# Patient Record
Sex: MALE | Race: WHITE | NOT HISPANIC OR LATINO | Employment: OTHER | URBAN - NONMETROPOLITAN AREA
[De-identification: names, ages, dates, MRNs, and addresses within clinical notes are randomized per-mention and may not be internally consistent; named-entity substitution may affect disease eponyms.]

---

## 2017-01-13 ENCOUNTER — OFFICE VISIT (OUTPATIENT)
Dept: FAMILY MEDICINE CLINIC | Facility: CLINIC | Age: 57
End: 2017-01-13

## 2017-01-13 VITALS
OXYGEN SATURATION: 98 % | HEIGHT: 68 IN | WEIGHT: 315 LBS | BODY MASS INDEX: 47.74 KG/M2 | SYSTOLIC BLOOD PRESSURE: 132 MMHG | HEART RATE: 72 BPM | DIASTOLIC BLOOD PRESSURE: 76 MMHG

## 2017-01-13 DIAGNOSIS — M25.562 CHRONIC PAIN OF BOTH KNEES: Primary | ICD-10-CM

## 2017-01-13 DIAGNOSIS — M54.42 CHRONIC BILATERAL LOW BACK PAIN WITH LEFT-SIDED SCIATICA: ICD-10-CM

## 2017-01-13 DIAGNOSIS — G89.29 CHRONIC PAIN OF BOTH KNEES: Primary | ICD-10-CM

## 2017-01-13 DIAGNOSIS — M25.561 CHRONIC PAIN OF BOTH KNEES: Primary | ICD-10-CM

## 2017-01-13 DIAGNOSIS — G89.29 CHRONIC BILATERAL LOW BACK PAIN WITH LEFT-SIDED SCIATICA: ICD-10-CM

## 2017-01-13 PROCEDURE — 99203 OFFICE O/P NEW LOW 30 MIN: CPT | Performed by: FAMILY MEDICINE

## 2017-01-13 RX ORDER — APIXABAN 5 MG/1
TABLET, FILM COATED ORAL
Refills: 11 | COMMUNITY
Start: 2017-01-03 | End: 2017-01-13

## 2017-01-13 RX ORDER — PREDNISONE 20 MG/1
TABLET ORAL
Refills: 0 | COMMUNITY
Start: 2017-01-03 | End: 2017-06-19

## 2017-01-13 NOTE — MR AVS SNAPSHOT
Uriah Willingham   1/13/2017 10:15 AM   Office Visit    Dept Phone:  [phone]   Encounter #:  [id number]    Provider:  Jaiden Rivera MD   Department:  [facility]                Your Full Care Plan              Your Updated Medication List          This list is accurate as of: 1/13/17 11:11 AM.  Always use your most recent med list.                celecoxib 200 MG capsule   Commonly known as:  CeleBREX       ELIQUIS PO       magnesium oxide 400 (241.3 MG) MG tablet tablet   Commonly known as:  MAGOX       metoprolol succinate XL 25 MG 24 hr tablet   Commonly known as:  TOPROL-XL       predniSONE 20 MG tablet   Commonly known as:  DELTASONE               You Were Diagnosed With        Codes Comments    Chronic pain of both knees    -  Primary ICD-10-CM: M25.561, M25.562, G89.29  ICD-9-CM: 719.46, 338.29     Chronic bilateral low back pain with left-sided sciatica     ICD-10-CM: M54.42, G89.29  ICD-9-CM: 724.2, 724.3, 338.29       Instructions    Knee Pain  Knee pain is a very common symptom and can have many causes. Knee pain often goes away when you follow your health care provider's instructions for relieving pain and discomfort at home. However, knee pain can develop into a condition that needs treatment. Some conditions may include:  · Arthritis caused by wear and tear (osteoarthritis).  · Arthritis caused by swelling and irritation (rheumatoid arthritis or gout).  · A cyst or growth in your knee.  · An infection in your knee joint.  · An injury that will not heal.  · Damage, swelling, or irritation of the tissues that support your knee (torn ligaments or tendinitis).  If your knee pain continues, additional tests may be ordered to diagnose your condition. Tests may include X-rays or other imaging studies of your knee. You may also need to have fluid removed from your knee. Treatment for ongoing knee pain depends on the cause, but treatment may  include:  · Medicines to relieve pain or swelling.  · Steroid injections in your knee.  · Physical therapy.  · Surgery.  HOME CARE INSTRUCTIONS  · Take medicines only as directed by your health care provider.  · Rest your knee and keep it raised (elevated) while you are resting.  · Do not do things that cause or worsen pain.  · Avoid high-impact activities or exercises, such as running, jumping rope, or doing jumping jacks.  · Apply ice to the knee area:    Put ice in a plastic bag.    Place a towel between your skin and the bag.    Leave the ice on for 20 minutes, 2-3 times a day.  · Ask your health care provider if you should wear an elastic knee support.  · Keep a pillow under your knee when you sleep.  · Lose weight if you are overweight. Extra weight can put pressure on your knee.  · Do not use any tobacco products, including cigarettes, chewing tobacco, or electronic cigarettes. If you need help quitting, ask your health care provider. Smoking may slow the healing of any bone and joint problems that you may have.  SEEK MEDICAL CARE IF:  · Your knee pain continues, changes, or gets worse.  · You have a fever along with knee pain.  · Your knee jean claude or locks up.  · Your knee becomes more swollen.  SEEK IMMEDIATE MEDICAL CARE IF:   · Your knee joint feels hot to the touch.  · You have chest pain or trouble breathing.     This information is not intended to replace advice given to you by your health care provider. Make sure you discuss any questions you have with your health care provider.     Document Released: 10/14/2008 Document Revised: 01/08/2016 Document Reviewed: 08/03/2015  Elsevier Interactive Patient Education ©2016 [employer]       Patient Instructions History      Upcoming Appointments     Visit Type Date Time Department    NEW PATIENT 1/13/2017 10:15 AM [facility] RESIDENT [facility]    LAB 2/13/2017  4:00 PM  [facility] OP INFUSION    FOLLOW UP 2/20/2017  3:30 PM [facility] ONC KORT[facility] Rodney Signup      "[facility] Next[employer] allows you to send messages to your doctor, view your test results, renew your prescriptions, schedule appointments, and more. To sign up, go to [employer] and click on the Sign Up Now link in the New User? box. Enter your Next[employer] Activation Code exactly as it appears below along with the last four digits of your Social Security Number and your Date of Birth () to complete the sign-up process. If you do not sign up before the expiration date, you must request a new code.    NextBio Activation Code: [email or web address]  Expires: 2017 11:09 AM    If you have questions, you can email [email or web address] or call [phone] to talk to our Next[employer] staff. Remember, NextBio is NOT to be used for urgent needs. For medical emergencies, dial 911.               Other Info from Your Visit           Your Appointments     2017  4:00 PM CST   LAB with NURSE CALVIN COREA   [facility] OP INFUSION ([address] [address] [phone]            2017  3:30 PM CST   Follow Up with Jovani Hernández MD   [facility]EMATOLOGY [facility] ONCOLOGY [address] [address] [phone]           Arrive 15 minutes prior to appointment.              Allergies     No Known Allergies      Reason for Visit     Establish Care     Hypertension     Knee Pain           Vital Signs     Blood Pressure Pulse Height Weight Oxygen Saturation Body Mass Index    132/76 (BP Location: Left arm, Patient Position: Sitting, Cuff Size: Adult) 72 68\" (172.7 cm) 363 lb 12.8 oz (165 kg) 98% 55.32 kg/m2    Smoking Status                   Never Smoker           Problems and Diagnoses Noted     Chronic pain of both knees    -  Primary    Chronic bilateral low back pain with left-sided sciatica            "

## 2017-01-13 NOTE — PROGRESS NOTES
Subjective:     Uriah Willingham is a 56 y.o. male who presents for initial evaluation of HTN and for knee pain.    New concerns: None.     Evaluation:   Blood pressure reading not indicated for medication reasons: No   Blood pressure reading refused by patient: No   Home blood pressure readings: Unknown   Blood pressure often elevated in physician office: No   Onset of symptoms: 1 month   Duration of symptoms: 1 month   Symptoms:   Headache: no   Visual disturbance: no   Fatigue: yes   Dyspnea: no   Orthopnea: no   Edema: no   Chest pain: no   Palpitations: no   Diaphoresis: no    Risk Factors:   Obesity, FHx: HTN    Comorbid Conditions:   None    The following portions of the patient's history were reviewed and updated as appropriate: allergies, current medications, past family history, past medical history, past social history, past surgical history and problem list.    Preventative:  Over the past 2 weeks, have you felt down, depressed, or hopeless?No   Over the past 2 weeks, have you felt little interest or pleasure in doing things?No  Clinical depression screening refused by patient.No     On osteoporosis therapy?No     Past Medical Hx:  Past Medical History   Diagnosis Date   • Arthritis    • Hypertension    • Obesity        Past Surgical Hx:  Past Surgical History   Procedure Laterality Date   • Colonoscopy  07/22/2014     Diverticulosis in sigmoid colon. Normal cecum & rectum.   • Injection of medication  11/28/2016     Kenalog (2)      • Injection of medication  07/26/2013     Rocephin (1)      • Injection of medication  11/28/2016     Toradol (2)      • Appendectomy     • Tonsillectomy         Health Maintenance:  Health Maintenance   Topic Date Due   • HEPATITIS C SCREENING  01/13/2017   • INFLUENZA VACCINE  08/01/2016   • TDAP/TD VACCINES (2 - Td) 07/29/2023   • COLONOSCOPY  07/22/2024       Current Meds:    Current Outpatient Prescriptions:   •  Apixaban (ELIQUIS PO), Take 1 tablet by mouth. 1  tablet as directed Oral, Disp: , Rfl:   •  celecoxib (CeleBREX) 200 MG capsule, Take 200 mg by mouth Daily., Disp: , Rfl:   •  ELIQUIS 5 MG tablet tablet, , Disp: , Rfl: 11  •  magnesium oxide (MAGOX) 400 (241.3 MG) MG tablet tablet, Take 400 mg by mouth 2 (Two) Times a Day., Disp: , Rfl:   •  metoprolol succinate XL (TOPROL-XL) 25 MG 24 hr tablet, Take 12.5 mg by mouth Daily., Disp: , Rfl:   •  naproxen (NAPROSYN) 250 MG tablet, Take 250 mg by mouth Every Night., Disp: , Rfl:   •  predniSONE (DELTASONE) 20 MG tablet, , Disp: , Rfl: 0  •  ibuprofen (ADVIL,MOTRIN) 200 MG tablet, Take 200 mg by mouth 3 (Three) Times a Day As Needed for mild pain (1-3)., Disp: , Rfl:     Allergies:  Review of patient's allergies indicates no known allergies.    Family Hx:  Family History   Problem Relation Age of Onset   • COPD Mother    • Diabetes Father    • Heart disease Father         Social History:  Social History     Social History   • Marital status:      Spouse name: N/A   • Number of children: N/A   • Years of education: N/A     Occupational History   • Not on file.     Social History Main Topics   • Smoking status: Never Smoker   • Smokeless tobacco: Not on file   • Alcohol use Yes   • Drug use: Not on file   • Sexual activity: Not on file     Other Topics Concern   • Not on file     Social History Narrative       Review of Systems  Review of Systems   Constitutional: Negative for activity change, appetite change, fatigue and fever.   HENT: Negative for ear pain and sore throat.    Eyes: Negative for pain and visual disturbance.   Respiratory: Negative for cough and shortness of breath.    Cardiovascular: Negative for chest pain and palpitations.   Gastrointestinal: Negative for abdominal pain and nausea.   Endocrine: Negative for cold intolerance and heat intolerance.   Genitourinary: Negative for difficulty urinating and dysuria.   Musculoskeletal: Negative for arthralgias and gait problem.   Skin: Negative for  "color change and rash.   Neurological: Negative for dizziness, weakness and headaches.   Hematological: Negative for adenopathy. Does not bruise/bleed easily.   Psychiatric/Behavioral: Negative for agitation, confusion and sleep disturbance.       Objective:     Visit Vitals   • /76 (BP Location: Left arm, Patient Position: Sitting, Cuff Size: Adult)   • Pulse 72   • Ht 68\" (172.7 cm)   • Wt (!) 363 lb 12.8 oz (165 kg)   • SpO2 98%   • BMI 55.32 kg/m2     Physical Exam   Constitutional: He is oriented to person, place, and time and well-developed, well-nourished, and in no distress. No distress.   HENT:   Head: Normocephalic and atraumatic.   Right Ear: External ear normal.   Left Ear: External ear normal.   Nose: Nose normal.   Mouth/Throat: Oropharynx is clear and moist.   Eyes: Conjunctivae and EOM are normal. Pupils are equal, round, and reactive to light.   Neck: Normal range of motion. Neck supple. No tracheal deviation present. No thyromegaly present.   Cardiovascular: Normal rate, regular rhythm, normal heart sounds and intact distal pulses.  Exam reveals no gallop and no friction rub.    No murmur heard.  Pulmonary/Chest: Effort normal and breath sounds normal. No respiratory distress. He has no wheezes. He has no rales. He exhibits no tenderness.   Abdominal: Soft. Bowel sounds are normal. He exhibits no distension and no mass. There is no tenderness. There is no rebound and no guarding.   Musculoskeletal: Normal range of motion. He exhibits no edema or tenderness.   Neurological: He is alert and oriented to person, place, and time. Gait normal. GCS score is 15.   Skin: Skin is warm and dry. No rash noted. He is not diaphoretic. No erythema. No pallor.         Lab Review    none     Assessment:     Hypertension stable.    1. Chronic pain of both knees    2. Chronic bilateral low back pain with left-sided sciatica         Plan:   Uriah was seen today for establish care, hypertension and knee " pain.    Diagnoses and all orders for this visit:    Chronic pain of both knees    Chronic bilateral low back pain with left-sided sciatica        1.  Rx changes: as above  2.  Education:    EKG ordered: no    Findings: N/A   Presented an overview of HTN, expected course, considerations, risk factors, and exacerbation prevention.   Discussed treatment options for HTN: yes   Recommended restricted dietary Na intake: yes   Discussed patient action plan for HTN: yes  3.  Compliance at present is estimated to be excellent. Efforts to improve compliance (if necessary) will be directed at increased exercise.  4.  Follow up: 3 months    GOALS:  To lose weight  BARRIERS TO GOALS:  None     Preventative:  Male Preventative: Colon cancer screening is up to date  unknown status, parent/patient to bring shot records   Recommended:none  Non smoker  does not drink  eat more fruits and vegetables, increase water intake and increase physical activity    RISK SCORE: 4          This document has been electronically signed by Jaiden Rivera MD on January 13, 2017 10:52 AM

## 2017-01-13 NOTE — PATIENT INSTRUCTIONS
Knee Pain  Knee pain is a very common symptom and can have many causes. Knee pain often goes away when you follow your health care provider's instructions for relieving pain and discomfort at home. However, knee pain can develop into a condition that needs treatment. Some conditions may include:  · Arthritis caused by wear and tear (osteoarthritis).  · Arthritis caused by swelling and irritation (rheumatoid arthritis or gout).  · A cyst or growth in your knee.  · An infection in your knee joint.  · An injury that will not heal.  · Damage, swelling, or irritation of the tissues that support your knee (torn ligaments or tendinitis).  If your knee pain continues, additional tests may be ordered to diagnose your condition. Tests may include X-rays or other imaging studies of your knee. You may also need to have fluid removed from your knee. Treatment for ongoing knee pain depends on the cause, but treatment may include:  · Medicines to relieve pain or swelling.  · Steroid injections in your knee.  · Physical therapy.  · Surgery.  HOME CARE INSTRUCTIONS  · Take medicines only as directed by your health care provider.  · Rest your knee and keep it raised (elevated) while you are resting.  · Do not do things that cause or worsen pain.  · Avoid high-impact activities or exercises, such as running, jumping rope, or doing jumping jacks.  · Apply ice to the knee area:    Put ice in a plastic bag.    Place a towel between your skin and the bag.    Leave the ice on for 20 minutes, 2-3 times a day.  · Ask your health care provider if you should wear an elastic knee support.  · Keep a pillow under your knee when you sleep.  · Lose weight if you are overweight. Extra weight can put pressure on your knee.  · Do not use any tobacco products, including cigarettes, chewing tobacco, or electronic cigarettes. If you need help quitting, ask your health care provider. Smoking may slow the healing of any bone and joint problems that you may  have.  SEEK MEDICAL CARE IF:  · Your knee pain continues, changes, or gets worse.  · You have a fever along with knee pain.  · Your knee jean claude or locks up.  · Your knee becomes more swollen.  SEEK IMMEDIATE MEDICAL CARE IF:   · Your knee joint feels hot to the touch.  · You have chest pain or trouble breathing.     This information is not intended to replace advice given to you by your health care provider. Make sure you discuss any questions you have with your health care provider.     Document Released: 10/14/2008 Document Revised: 01/08/2016 Document Reviewed: 08/03/2015  [employer] Interactive Patient Education ©2016 [employer]

## 2017-02-10 DIAGNOSIS — I26.99 PULMONARY THROMBOSIS (HCC): Primary | ICD-10-CM

## 2017-02-13 ENCOUNTER — TRANSCRIBE ORDERS (OUTPATIENT)
Dept: CARDIOLOGY | Facility: CLINIC | Age: 57
End: 2017-02-13

## 2017-02-13 ENCOUNTER — LAB (OUTPATIENT)
Dept: ONCOLOGY | Facility: HOSPITAL | Age: 57
End: 2017-02-13

## 2017-02-13 DIAGNOSIS — I26.99 PULMONARY THROMBOSIS (HCC): ICD-10-CM

## 2017-02-13 DIAGNOSIS — Z86.718 PERSONAL HISTORY OF VENOUS THROMBOSIS AND EMBOLISM: Primary | ICD-10-CM

## 2017-02-13 LAB
ALBUMIN SERPL-MCNC: 4.3 G/DL (ref 3.4–4.8)
ALBUMIN/GLOB SERPL: 1.3 G/DL (ref 1.1–1.8)
ALP SERPL-CCNC: 52 U/L (ref 38–126)
ALT SERPL W P-5'-P-CCNC: 31 U/L (ref 21–72)
ANION GAP SERPL CALCULATED.3IONS-SCNC: 9 MMOL/L (ref 5–15)
AST SERPL-CCNC: 27 U/L (ref 17–59)
BASOPHILS # BLD AUTO: 0.01 10*3/MM3 (ref 0–0.2)
BASOPHILS NFR BLD AUTO: 0.2 % (ref 0–2)
BILIRUB SERPL-MCNC: 1.3 MG/DL (ref 0.2–1.3)
BUN BLD-MCNC: 21 MG/DL (ref 7–21)
BUN/CREAT SERPL: 21.6 (ref 7–25)
CALCIUM SPEC-SCNC: 9.5 MG/DL (ref 8.4–10.2)
CHLORIDE SERPL-SCNC: 102 MMOL/L (ref 95–110)
CO2 SERPL-SCNC: 27 MMOL/L (ref 22–31)
CREAT BLD-MCNC: 0.97 MG/DL (ref 0.7–1.3)
DEPRECATED RDW RBC AUTO: 41 FL (ref 35.1–43.9)
EOSINOPHIL # BLD AUTO: 0.11 10*3/MM3 (ref 0–0.7)
EOSINOPHIL NFR BLD AUTO: 1.8 % (ref 0–7)
ERYTHROCYTE [DISTWIDTH] IN BLOOD BY AUTOMATED COUNT: 12.9 % (ref 11.5–14.5)
GFR SERPL CREATININE-BSD FRML MDRD: 81 ML/MIN/1.73 (ref 56–130)
GLOBULIN UR ELPH-MCNC: 3.3 GM/DL (ref 2.3–3.5)
GLUCOSE BLD-MCNC: 130 MG/DL (ref 60–100)
HCT VFR BLD AUTO: 46.3 % (ref 39–49)
HGB BLD-MCNC: 16.5 G/DL (ref 13.7–17.3)
IMM GRANULOCYTES # BLD: 0.01 10*3/MM3 (ref 0–0.02)
IMM GRANULOCYTES NFR BLD: 0.2 % (ref 0–0.5)
LYMPHOCYTES # BLD AUTO: 2.25 10*3/MM3 (ref 0.6–4.2)
LYMPHOCYTES NFR BLD AUTO: 35.9 % (ref 10–50)
MCH RBC QN AUTO: 30.7 PG (ref 26.5–34)
MCHC RBC AUTO-ENTMCNC: 35.6 G/DL (ref 31.5–36.3)
MCV RBC AUTO: 86.2 FL (ref 80–98)
MONOCYTES # BLD AUTO: 0.29 10*3/MM3 (ref 0–0.9)
MONOCYTES NFR BLD AUTO: 4.6 % (ref 0–12)
NEUTROPHILS # BLD AUTO: 3.59 10*3/MM3 (ref 2–8.6)
NEUTROPHILS NFR BLD AUTO: 57.3 % (ref 37–80)
PLATELET # BLD AUTO: 172 10*3/MM3 (ref 150–450)
PMV BLD AUTO: 9.8 FL (ref 8–12)
POTASSIUM BLD-SCNC: 4 MMOL/L (ref 3.5–5.1)
PROT SERPL-MCNC: 7.6 G/DL (ref 6.3–8.6)
RBC # BLD AUTO: 5.37 10*6/MM3 (ref 4.37–5.74)
SODIUM BLD-SCNC: 138 MMOL/L (ref 137–145)
WBC NRBC COR # BLD: 6.26 10*3/MM3 (ref 3.2–9.8)

## 2017-02-13 PROCEDURE — 36415 COLL VENOUS BLD VENIPUNCTURE: CPT | Performed by: INTERNAL MEDICINE

## 2017-02-13 PROCEDURE — 85025 COMPLETE CBC W/AUTO DIFF WBC: CPT | Performed by: INTERNAL MEDICINE

## 2017-02-13 PROCEDURE — 81240 F2 GENE: CPT | Performed by: INTERNAL MEDICINE

## 2017-02-13 PROCEDURE — 85670 THROMBIN TIME PLASMA: CPT | Performed by: INTERNAL MEDICINE

## 2017-02-13 PROCEDURE — 85705 THROMBOPLASTIN INHIBITION: CPT | Performed by: INTERNAL MEDICINE

## 2017-02-13 PROCEDURE — 85732 THROMBOPLASTIN TIME PARTIAL: CPT | Performed by: INTERNAL MEDICINE

## 2017-02-13 PROCEDURE — 85613 RUSSELL VIPER VENOM DILUTED: CPT | Performed by: INTERNAL MEDICINE

## 2017-02-13 PROCEDURE — 80053 COMPREHEN METABOLIC PANEL: CPT | Performed by: INTERNAL MEDICINE

## 2017-02-16 LAB
DRVVT IMM 1:2 NP PPP: 43.2 SEC (ref 0–44)
LA NT DPL PPP: 50.4 SEC (ref 0–55)
LA NT DPL/LA NT HPL PPP-RTO: 1.25 RATIO (ref 0–1.4)
LA NT PLATELET PPP: 29.9 SEC (ref 0–40.6)
LUPUS ANTICOAGULANT REFLEX: ABNORMAL
SCREEN DRVVT: 50.6 SEC (ref 0–44)
THROMBIN TIME: 17.3 SEC (ref 0–20.9)

## 2017-02-20 ENCOUNTER — OFFICE VISIT (OUTPATIENT)
Dept: ONCOLOGY | Facility: CLINIC | Age: 57
End: 2017-02-20

## 2017-02-20 VITALS
RESPIRATION RATE: 20 BRPM | SYSTOLIC BLOOD PRESSURE: 191 MMHG | TEMPERATURE: 97.8 F | DIASTOLIC BLOOD PRESSURE: 88 MMHG | HEART RATE: 47 BPM

## 2017-02-20 DIAGNOSIS — I26.99 OTHER ACUTE PULMONARY EMBOLISM: Primary | ICD-10-CM

## 2017-02-20 LAB
F2 GENE MUT ANL BLD/T: NORMAL
Lab: NORMAL

## 2017-02-20 PROCEDURE — 99214 OFFICE O/P EST MOD 30 MIN: CPT | Performed by: INTERNAL MEDICINE

## 2017-02-20 NOTE — PROGRESS NOTES
DATE OF VISIT: 2/20/2017    REASON FOR VISIT:  Left lower extremity DVT and bilateral pulmonary embolism on Apixaban    HISTORY OF PRESENT ILLNESS:    56-year-old male with a past medical history significant for morbid obesity, history of osteoarthritis affecting bilateral hip and knees, was seen in consultation on November 28, 2016 for acute left lower extremity DVT and bilateral pulmonary embolism diagnosed on November 11, 2016.  At that point patient had hypercoagulable workup done which showed lupus anticoagulant being positive.  Patient had a repeat blood work done last week.  He is  here to discuss the result of blood work.  Denies any blood in the stool or urine.    PAST MEDICAL HISTORY:    Past Medical History   Diagnosis Date   • Arthritis    • Hypertension    • Obesity        SOCIAL HISTORY:    Social History   Substance Use Topics   • Smoking status: Never Smoker   • Smokeless tobacco: None   • Alcohol use Yes       Surgical History :  Past Surgical History   Procedure Laterality Date   • Colonoscopy  07/22/2014     Diverticulosis in sigmoid colon. Normal cecum & rectum.   • Injection of medication  11/28/2016     Kenalog (2)      • Injection of medication  07/26/2013     Rocephin (1)      • Injection of medication  11/28/2016     Toradol (2)      • Appendectomy     • Tonsillectomy         ALLERGIES:    No Known Allergies    REVIEW OF SYSTEMS:      CONSTITUTIONAL:  No fever, chills, or night sweats.     HEENT:  No epistaxis, mouth sores, or difficulty swallowing.    RESPIRATORY:  Complains of chronic shortness of breath.  Denies any new cough or hemoptysis.    CARDIOVASCULAR:  No chest pain or palpitations.    GASTROINTESTINAL:  No abdominal pain, nausea, vomiting, or blood in the stool.    GENITOURINARY:  No dysuria or hematuria.    MUSCULOSKELETAL:  No any new back pain or arthralgias.     NEUROLOGICAL:  No tingling or numbness. No new headache or dizziness.     LYMPHATICS:  Denies any abnormal swollen  and anywhere in the body.    SKIN:  Denies any new skin rash.    PHYSICAL EXAMINATION:      VITAL SIGNS:    Visit Vitals   • BP (!) 191/88   • Pulse (!) 47   • Temp 97.8 °F (36.6 °C)   • Resp 20       GENERAL:  Not in any distress.    HEENT:  Normocephalic, Atraumatic.No Conjunctival pallor. No icterus. Extraocular Movements Intact. No Fascial Asymmetry noted.    NECK:  No adenopathy. No JVD.    RESPIRATORY:  Fair air entry bilateral. No rhonchi or wheezing.    CARDIOVASCULAR:  S1, S2. Regular rate and rhythm. No murmur or gallop appreciated.    ABDOMEN:  Soft, obese, nontender. Bowel sounds present in all four quadrants.  No organomegaly appreciated.    EXTREMITIES:  No edema.No Calf Tenderness.    NEUROLOGIC:  Alert, awake and oriented ×3.  No  Motor or sensory deficit appreciated. Cranial Nerves 2-12 grossly intact.      DIAGNOSTIC DATA:    GLUCOSE   Date Value Ref Range Status   02/13/2017 130 (H) 60 - 100 mg/dL Final   11/11/2016 213 (H) 60 - 100 mg/dl Final     SODIUM   Date Value Ref Range Status   02/13/2017 138 137 - 145 mmol/L Final   11/11/2016 139 137 - 145 mmol/L Final     POTASSIUM   Date Value Ref Range Status   02/13/2017 4.0 3.5 - 5.1 mmol/L Final   11/11/2016 3.9 3.5 - 5.1 mmol/L Final     CO2   Date Value Ref Range Status   02/13/2017 27.0 22.0 - 31.0 mmol/L Final   11/11/2016 24 22 - 31 mmol/L Final     CHLORIDE   Date Value Ref Range Status   02/13/2017 102 95 - 110 mmol/L Final   11/11/2016 103 95 - 110 mmol/L Final     ANION GAP   Date Value Ref Range Status   02/13/2017 9.0 5.0 - 15.0 mmol/L Final   11/11/2016 12.0 5.0 - 15.0 mmol/L Final     CREATININE   Date Value Ref Range Status   02/13/2017 0.97 0.70 - 1.30 mg/dL Final   11/11/2016 0.9 0.7 - 1.3 mg/dl Final     BUN   Date Value Ref Range Status   02/13/2017 21 7 - 21 mg/dL Final   11/11/2016 17 7 - 21 mg/dl Final     BUN/CREATININE RATIO   Date Value Ref Range Status   02/13/2017 21.6 7.0 - 25.0 Final     CALCIUM   Date Value Ref Range  Status   02/13/2017 9.5 8.4 - 10.2 mg/dL Final   11/11/2016 9.4 8.4 - 10.2 mg/dl Final     EGFR NON  AMER   Date Value Ref Range Status   02/13/2017 81 56 - 130 mL/min/1.73 Final     ALKALINE PHOSPHATASE   Date Value Ref Range Status   02/13/2017 52 38 - 126 U/L Final   11/11/2016 71 38 - 126 U/L Final     TOTAL PROTEIN   Date Value Ref Range Status   02/13/2017 7.6 6.3 - 8.6 g/dL Final   11/11/2016 7.6 6.3 - 8.6 gm/dl Final     ALT (SGPT)   Date Value Ref Range Status   02/13/2017 31 21 - 72 U/L Final   11/11/2016 28 21 - 72 U/L Final     AST (SGOT)   Date Value Ref Range Status   02/13/2017 27 17 - 59 U/L Final   11/11/2016 28 17 - 59 U/L Final     TOTAL BILIRUBIN   Date Value Ref Range Status   02/13/2017 1.3 0.2 - 1.3 mg/dL Final   11/11/2016 2.2 (H) 0.2 - 1.3 mg/dl Final     ALBUMIN   Date Value Ref Range Status   02/13/2017 4.30 3.40 - 4.80 g/dL Final   11/11/2016 4.1 3.4 - 4.8 gm/dl Final     GLOBULIN   Date Value Ref Range Status   02/13/2017 3.3 2.3 - 3.5 gm/dL Final     A/G RATIO   Date Value Ref Range Status   02/13/2017 1.3 1.1 - 1.8 g/dL Final     Lab Results   Component Value Date    WBC 6.26 02/13/2017    HGB 16.5 02/13/2017    HCT 46.3 02/13/2017    MCV 86.2 02/13/2017     02/13/2017     Lab Results   Component Value Date    NEUTROABS 3.59 02/13/2017     Lupus anticoagulant done on February 13, 2017 is negative for any Lupus anticoagulant activity.  Prothrombin gene mutation done on February 13, 2017 is negative.        RADIOLOGY DATA :  CT angiogram done on November 11, 2016 shows:  FINDINGS- Lungs are expanded. There is peripheral airspace opacity in  the right lower lobe that may represent pulmonary infarct.   Bronchovascular markings are mildly prominent. There is densely  calcified lingular pulmonary nodule. Lungs are otherwise clear. No  pleural effusions are visualized.      The heart is mildly enlarged without evidence for pericardial  effusion. Atherosclerotic calcification of  the coronary arteries is  present.      Pulmonary arteries reveal multiple pulmonary emboli within the lobar  branches of the right upper lobe, right lower lobe, right middle lobe,  left lower lobe, lingula and left upper lobe. Emboli are also visible  within the distal right and left main pulmonary arteries.      The thoracic aorta is mildly tortuous..      There is no evidence for mediastinal, hilar or axillary  lymphadenopathy.      Chest wall has a normal appearance. There is multilevel thoracic  spondylosis.      Visualized abdominal structures are within normal limits.      IMPRESSION- Multiple bilateral pulmonary emboli throughout both lungs  with possible right lower lobe pulmonary infarct      Doppler ultrasound of bilateral lower extremity done on November 11, 2016 showed:  CONCLUSION- Sonographic evidence for deep venous stenosis within the  left popliteal vein and proximal left posterior tibial vein.    ASSESSMENT AND PLAN:      1.  Left lower extremity DVT and bilateral pulmonary embolism diagnosed in November 11, 2016.  Patient had a hypercoagulable workup done including prothrombin gene mutation, factor V Leiden, antithrombin III, protein C, protein S, anticardiolipin antibody, lupus anticoagulant everything is negative so far.  DVT and PE most likely secondary to his morbid obesity.  At this point recommend continuing anticoagulation with Apixaban for 3 more months to finish total 6 months of anticoagulation after that we will repeat CT angiogram before his next clinic visit in about 3 months to see what pulmonary embolism has done on anticoagulation.  He still complains of shortness of breath with minimal exertion which could be related to his morbid obesity versus residual clot.  We will see him back in about 3 months with a repeat CBC, CMP and CT angiogram done prior to that.  Patient had a recent Doppler ultrasound of lower extremity done on February 17 results of which are not available at this  point to review.    2.  Morbid obesity    3.  Osteoarthritis    4.  Health maintenance: Patient does not smoke.  Had a colonoscopy in 2015.  He remains full code.    Jovani Hernández MD  2/20/2017  4:01 PM

## 2017-05-01 DIAGNOSIS — I26.99 OTHER PULMONARY EMBOLISM WITHOUT ACUTE COR PULMONALE, UNSPECIFIED CHRONICITY (HCC): Primary | ICD-10-CM

## 2017-05-19 ENCOUNTER — LAB (OUTPATIENT)
Dept: LAB | Facility: HOSPITAL | Age: 57
End: 2017-05-19
Attending: INTERNAL MEDICINE

## 2017-05-19 ENCOUNTER — HOSPITAL ENCOUNTER (OUTPATIENT)
Dept: CT IMAGING | Facility: HOSPITAL | Age: 57
Discharge: HOME | End: 2017-05-19
Attending: INTERNAL MEDICINE | Admitting: INTERNAL MEDICINE

## 2017-05-19 DIAGNOSIS — I26.99 OTHER PULMONARY EMBOLISM WITHOUT ACUTE COR PULMONALE, UNSPECIFIED CHRONICITY (HCC): ICD-10-CM

## 2017-05-19 DIAGNOSIS — I26.99 OTHER ACUTE PULMONARY EMBOLISM: ICD-10-CM

## 2017-05-19 LAB
ALBUMIN SERPL-MCNC: 4.4 G/DL (ref 3.4–4.8)
ALBUMIN/GLOB SERPL: 1.3 G/DL (ref 1.1–1.8)
ALP SERPL-CCNC: 63 U/L (ref 38–126)
ALT SERPL W P-5'-P-CCNC: 44 U/L (ref 21–72)
ANION GAP SERPL CALCULATED.3IONS-SCNC: 11 MMOL/L (ref 5–15)
AST SERPL-CCNC: 34 U/L (ref 17–59)
BASOPHILS # BLD AUTO: 0.02 10*3/MM3 (ref 0–0.2)
BASOPHILS NFR BLD AUTO: 0.3 % (ref 0–2)
BILIRUB SERPL-MCNC: 2 MG/DL (ref 0.2–1.3)
BUN BLD-MCNC: 23 MG/DL (ref 7–21)
BUN/CREAT SERPL: 23.5 (ref 7–25)
CALCIUM SPEC-SCNC: 9.4 MG/DL (ref 8.4–10.2)
CHLORIDE SERPL-SCNC: 105 MMOL/L (ref 95–110)
CO2 SERPL-SCNC: 24 MMOL/L (ref 22–31)
CREAT BLD-MCNC: 0.98 MG/DL (ref 0.7–1.3)
DEPRECATED RDW RBC AUTO: 40.6 FL (ref 35.1–43.9)
EOSINOPHIL # BLD AUTO: 0.1 10*3/MM3 (ref 0–0.7)
EOSINOPHIL NFR BLD AUTO: 1.3 % (ref 0–7)
ERYTHROCYTE [DISTWIDTH] IN BLOOD BY AUTOMATED COUNT: 12.6 % (ref 11.5–14.5)
GFR SERPL CREATININE-BSD FRML MDRD: 80 ML/MIN/1.73 (ref 56–130)
GLOBULIN UR ELPH-MCNC: 3.3 GM/DL (ref 2.3–3.5)
GLUCOSE BLD-MCNC: 101 MG/DL (ref 60–100)
HCT VFR BLD AUTO: 47.9 % (ref 39–49)
HGB BLD-MCNC: 16.9 G/DL (ref 13.7–17.3)
IMM GRANULOCYTES # BLD: 0.03 10*3/MM3 (ref 0–0.02)
IMM GRANULOCYTES NFR BLD: 0.4 % (ref 0–0.5)
LYMPHOCYTES # BLD AUTO: 2.28 10*3/MM3 (ref 0.6–4.2)
LYMPHOCYTES NFR BLD AUTO: 30.2 % (ref 10–50)
MCH RBC QN AUTO: 30.8 PG (ref 26.5–34)
MCHC RBC AUTO-ENTMCNC: 35.3 G/DL (ref 31.5–36.3)
MCV RBC AUTO: 87.4 FL (ref 80–98)
MONOCYTES # BLD AUTO: 0.53 10*3/MM3 (ref 0–0.9)
MONOCYTES NFR BLD AUTO: 7 % (ref 0–12)
NEUTROPHILS # BLD AUTO: 4.6 10*3/MM3 (ref 2–8.6)
NEUTROPHILS NFR BLD AUTO: 60.8 % (ref 37–80)
PLATELET # BLD AUTO: 190 10*3/MM3 (ref 150–450)
PMV BLD AUTO: 9.8 FL (ref 8–12)
POTASSIUM BLD-SCNC: 4.4 MMOL/L (ref 3.5–5.1)
PROT SERPL-MCNC: 7.7 G/DL (ref 6.3–8.6)
RBC # BLD AUTO: 5.48 10*6/MM3 (ref 4.37–5.74)
SODIUM BLD-SCNC: 140 MMOL/L (ref 137–145)
WBC NRBC COR # BLD: 7.56 10*3/MM3 (ref 3.2–9.8)

## 2017-05-19 PROCEDURE — 80053 COMPREHEN METABOLIC PANEL: CPT

## 2017-05-19 PROCEDURE — 71275 CT ANGIOGRAPHY CHEST: CPT

## 2017-05-19 PROCEDURE — 0 IOPAMIDOL PER 1 ML: Performed by: INTERNAL MEDICINE

## 2017-05-19 PROCEDURE — 85025 COMPLETE CBC W/AUTO DIFF WBC: CPT

## 2017-05-19 RX ADMIN — IOPAMIDOL 89 ML: 755 INJECTION, SOLUTION INTRAVENOUS at 16:45

## 2017-05-30 ENCOUNTER — APPOINTMENT (OUTPATIENT)
Dept: ONCOLOGY | Facility: HOSPITAL | Age: 57
End: 2017-05-30

## 2017-05-30 ENCOUNTER — OFFICE VISIT (OUTPATIENT)
Dept: ONCOLOGY | Facility: CLINIC | Age: 57
End: 2017-05-30

## 2017-05-30 VITALS
WEIGHT: 315 LBS | BODY MASS INDEX: 58.4 KG/M2 | TEMPERATURE: 97.3 F | RESPIRATION RATE: 22 BRPM | HEART RATE: 72 BPM | DIASTOLIC BLOOD PRESSURE: 90 MMHG | SYSTOLIC BLOOD PRESSURE: 175 MMHG

## 2017-05-30 DIAGNOSIS — I26.99 OTHER ACUTE PULMONARY EMBOLISM: Primary | ICD-10-CM

## 2017-05-30 PROCEDURE — 99214 OFFICE O/P EST MOD 30 MIN: CPT | Performed by: INTERNAL MEDICINE

## 2017-05-30 PROCEDURE — G0463 HOSPITAL OUTPT CLINIC VISIT: HCPCS | Performed by: INTERNAL MEDICINE

## 2017-05-30 RX ORDER — SPIRONOLACTONE 25 MG/1
25 TABLET ORAL DAILY
Refills: 10 | COMMUNITY
Start: 2017-03-10 | End: 2018-03-30

## 2017-06-19 ENCOUNTER — APPOINTMENT (OUTPATIENT)
Dept: LAB | Facility: HOSPITAL | Age: 57
End: 2017-06-19

## 2017-06-19 ENCOUNTER — HOSPITAL ENCOUNTER (OUTPATIENT)
Dept: CT IMAGING | Facility: HOSPITAL | Age: 57
Discharge: HOME | End: 2017-06-19
Admitting: NURSE PRACTITIONER

## 2017-06-19 PROCEDURE — 80053 COMPREHEN METABOLIC PANEL: CPT | Performed by: NURSE PRACTITIONER

## 2017-06-19 PROCEDURE — 85025 COMPLETE CBC W/AUTO DIFF WBC: CPT | Performed by: NURSE PRACTITIONER

## 2017-06-19 PROCEDURE — 84443 ASSAY THYROID STIM HORMONE: CPT | Performed by: NURSE PRACTITIONER

## 2017-06-19 PROCEDURE — 70450 CT HEAD/BRAIN W/O DYE: CPT

## 2017-06-19 PROCEDURE — 84439 ASSAY OF FREE THYROXINE: CPT | Performed by: NURSE PRACTITIONER

## 2017-09-01 ENCOUNTER — APPOINTMENT (OUTPATIENT)
Dept: GENERAL RADIOLOGY | Facility: HOSPITAL | Age: 57
End: 2017-09-01

## 2017-09-01 ENCOUNTER — HOSPITAL ENCOUNTER (EMERGENCY)
Facility: HOSPITAL | Age: 57
Discharge: HOME | End: 2017-09-02
Attending: EMERGENCY MEDICINE | Admitting: EMERGENCY MEDICINE

## 2017-09-01 ENCOUNTER — APPOINTMENT (OUTPATIENT)
Dept: CT IMAGING | Facility: HOSPITAL | Age: 57
End: 2017-09-01

## 2017-09-01 ENCOUNTER — APPOINTMENT (OUTPATIENT)
Dept: ULTRASOUND IMAGING | Facility: HOSPITAL | Age: 57
End: 2017-09-01

## 2017-09-01 DIAGNOSIS — R06.02 SHORTNESS OF BREATH: Primary | ICD-10-CM

## 2017-09-01 DIAGNOSIS — R09.1 PLEURISY: ICD-10-CM

## 2017-09-01 LAB
ALBUMIN SERPL-MCNC: 4.4 G/DL (ref 3.4–4.8)
ALBUMIN/GLOB SERPL: 1.5 G/DL (ref 1.1–1.8)
ALP SERPL-CCNC: 47 U/L (ref 38–126)
ALT SERPL W P-5'-P-CCNC: 41 U/L (ref 21–72)
ANION GAP SERPL CALCULATED.3IONS-SCNC: 10 MMOL/L (ref 5–15)
AST SERPL-CCNC: 28 U/L (ref 17–59)
BASOPHILS # BLD AUTO: 0.02 10*3/MM3 (ref 0–0.2)
BASOPHILS NFR BLD AUTO: 0.3 % (ref 0–2)
BILIRUB SERPL-MCNC: 2 MG/DL (ref 0.2–1.3)
BUN BLD-MCNC: 16 MG/DL (ref 7–21)
BUN/CREAT SERPL: 19 (ref 7–25)
CALCIUM SPEC-SCNC: 9.5 MG/DL (ref 8.4–10.2)
CHLORIDE SERPL-SCNC: 102 MMOL/L (ref 95–110)
CK MB SERPL-CCNC: 0.79 NG/ML (ref 0–5)
CK SERPL-CCNC: 66 U/L (ref 55–170)
CO2 SERPL-SCNC: 28 MMOL/L (ref 22–31)
CREAT BLD-MCNC: 0.84 MG/DL (ref 0.7–1.3)
D-DIMER, QUANTITATIVE (MAD,POW, STR): 494 NG/ML (FEU) (ref 0–470)
DEPRECATED RDW RBC AUTO: 42.9 FL (ref 35.1–43.9)
EOSINOPHIL # BLD AUTO: 0.11 10*3/MM3 (ref 0–0.7)
EOSINOPHIL NFR BLD AUTO: 1.8 % (ref 0–7)
ERYTHROCYTE [DISTWIDTH] IN BLOOD BY AUTOMATED COUNT: 13 % (ref 11.5–14.5)
GFR SERPL CREATININE-BSD FRML MDRD: 95 ML/MIN/1.73 (ref 56–130)
GLOBULIN UR ELPH-MCNC: 2.9 GM/DL (ref 2.3–3.5)
GLUCOSE BLD-MCNC: 100 MG/DL (ref 60–100)
HCT VFR BLD AUTO: 47 % (ref 39–49)
HGB BLD-MCNC: 16 G/DL (ref 13.7–17.3)
HOLD SPECIMEN: NORMAL
HOLD SPECIMEN: NORMAL
IMM GRANULOCYTES # BLD: 0.02 10*3/MM3 (ref 0–0.02)
IMM GRANULOCYTES NFR BLD: 0.3 % (ref 0–0.5)
LYMPHOCYTES # BLD AUTO: 2.38 10*3/MM3 (ref 0.6–4.2)
LYMPHOCYTES NFR BLD AUTO: 39.3 % (ref 10–50)
MCH RBC QN AUTO: 30.7 PG (ref 26.5–34)
MCHC RBC AUTO-ENTMCNC: 34 G/DL (ref 31.5–36.3)
MCV RBC AUTO: 90.2 FL (ref 80–98)
MONOCYTES # BLD AUTO: 0.51 10*3/MM3 (ref 0–0.9)
MONOCYTES NFR BLD AUTO: 8.4 % (ref 0–12)
NEUTROPHILS # BLD AUTO: 3.01 10*3/MM3 (ref 2–8.6)
NEUTROPHILS NFR BLD AUTO: 49.9 % (ref 37–80)
NT-PROBNP SERPL-MCNC: 57 PG/ML (ref 0–900)
PLATELET # BLD AUTO: 172 10*3/MM3 (ref 150–450)
PMV BLD AUTO: 10.3 FL (ref 8–12)
POTASSIUM BLD-SCNC: 4 MMOL/L (ref 3.5–5.1)
PROT SERPL-MCNC: 7.3 G/DL (ref 6.3–8.6)
RBC # BLD AUTO: 5.21 10*6/MM3 (ref 4.37–5.74)
SODIUM BLD-SCNC: 140 MMOL/L (ref 137–145)
TROPONIN I SERPL-MCNC: <0.012 NG/ML
WBC NRBC COR # BLD: 6.05 10*3/MM3 (ref 3.2–9.8)
WHOLE BLOOD HOLD SPECIMEN: NORMAL
WHOLE BLOOD HOLD SPECIMEN: NORMAL

## 2017-09-01 PROCEDURE — 93005 ELECTROCARDIOGRAM TRACING: CPT

## 2017-09-01 PROCEDURE — 93010 ELECTROCARDIOGRAM REPORT: CPT | Performed by: INTERNAL MEDICINE

## 2017-09-01 PROCEDURE — 71020 HC CHEST PA AND LATERAL: CPT

## 2017-09-01 PROCEDURE — 85379 FIBRIN DEGRADATION QUANT: CPT | Performed by: EMERGENCY MEDICINE

## 2017-09-01 PROCEDURE — 82550 ASSAY OF CK (CPK): CPT | Performed by: EMERGENCY MEDICINE

## 2017-09-01 PROCEDURE — 83880 ASSAY OF NATRIURETIC PEPTIDE: CPT | Performed by: EMERGENCY MEDICINE

## 2017-09-01 PROCEDURE — 0 IOPAMIDOL PER 1 ML: Performed by: EMERGENCY MEDICINE

## 2017-09-01 PROCEDURE — 84484 ASSAY OF TROPONIN QUANT: CPT | Performed by: EMERGENCY MEDICINE

## 2017-09-01 PROCEDURE — 82553 CREATINE MB FRACTION: CPT | Performed by: EMERGENCY MEDICINE

## 2017-09-01 PROCEDURE — 93970 EXTREMITY STUDY: CPT

## 2017-09-01 PROCEDURE — 80053 COMPREHEN METABOLIC PANEL: CPT | Performed by: EMERGENCY MEDICINE

## 2017-09-01 PROCEDURE — 99284 EMERGENCY DEPT VISIT MOD MDM: CPT

## 2017-09-01 PROCEDURE — 71275 CT ANGIOGRAPHY CHEST: CPT

## 2017-09-01 PROCEDURE — 85025 COMPLETE CBC W/AUTO DIFF WBC: CPT | Performed by: EMERGENCY MEDICINE

## 2017-09-01 RX ORDER — ASPIRIN 81 MG/1
81 TABLET, CHEWABLE ORAL DAILY
COMMUNITY
End: 2018-10-22

## 2017-09-01 RX ORDER — ACETAMINOPHEN 500 MG
500 TABLET ORAL 2 TIMES DAILY
COMMUNITY
End: 2018-07-30

## 2017-09-01 RX ORDER — SODIUM CHLORIDE 0.9 % (FLUSH) 0.9 %
10 SYRINGE (ML) INJECTION AS NEEDED
Status: DISCONTINUED | OUTPATIENT
Start: 2017-09-01 | End: 2017-09-02 | Stop reason: HOSPADM

## 2017-09-01 RX ADMIN — IOPAMIDOL 82 ML: 755 INJECTION, SOLUTION INTRAVENOUS at 22:09

## 2017-09-02 VITALS
SYSTOLIC BLOOD PRESSURE: 131 MMHG | DIASTOLIC BLOOD PRESSURE: 76 MMHG | RESPIRATION RATE: 18 BRPM | BODY MASS INDEX: 47.74 KG/M2 | TEMPERATURE: 98.2 F | HEIGHT: 68 IN | HEART RATE: 68 BPM | OXYGEN SATURATION: 93 % | WEIGHT: 315 LBS

## 2017-09-02 RX ORDER — AZITHROMYCIN 250 MG/1
250 TABLET, FILM COATED ORAL DAILY
Qty: 5 TABLET | Refills: 0 | Status: SHIPPED | OUTPATIENT
Start: 2017-09-02 | End: 2017-12-05

## 2017-09-02 NOTE — ED PROVIDER NOTES
Subjective   HPI Comments: Patient presents with shortness of breath x 2 days with lower extremity pain, similar to his prior episodes of DVT/PE.  Patient had been on eliquis, but patient was taken off about a month and a half ago and switched to ASA.  Patient noted the dyspnea yesterday at work, which continued today.  Noted mostly on the right side.  Is ambulatory.  Has had redness and warmth to the LLE this week, which improved with bactrim.  Patient has a hx of cellulitis for which he had a prescription already written.        History provided by:  Patient   used: No        Review of Systems   Constitutional: Negative.  Negative for appetite change, chills and fever.   HENT: Negative.  Negative for congestion.    Eyes: Negative.  Negative for photophobia and visual disturbance.   Respiratory: Positive for chest tightness and shortness of breath. Negative for cough.    Cardiovascular: Positive for leg swelling. Negative for chest pain and palpitations.   Gastrointestinal: Negative.  Negative for abdominal pain, constipation, diarrhea, nausea and vomiting.   Endocrine: Negative.    Genitourinary: Negative.  Negative for decreased urine volume, dysuria, flank pain and hematuria.   Musculoskeletal: Negative.  Negative for arthralgias, back pain, myalgias, neck pain and neck stiffness.   Skin: Negative.  Negative for pallor.   Neurological: Negative.  Negative for dizziness, syncope, weakness, light-headedness, numbness and headaches.   Psychiatric/Behavioral: Negative.  Negative for confusion and suicidal ideas. The patient is not nervous/anxious.    All other systems reviewed and are negative.      Past Medical History:   Diagnosis Date   • Arthritis    • Hypertension    • Obesity        No Known Allergies    Past Surgical History:   Procedure Laterality Date   • APPENDECTOMY     • COLONOSCOPY  07/22/2014    Diverticulosis in sigmoid colon. Normal cecum & rectum.   • INJECTION OF MEDICATION   11/28/2016    Kenalog (2)      • INJECTION OF MEDICATION  07/26/2013    Rocephin (1)      • INJECTION OF MEDICATION  11/28/2016    Toradol (2)      • TONSILLECTOMY         Family History   Problem Relation Age of Onset   • COPD Mother    • Diabetes Father    • Heart disease Father        Social History     Social History   • Marital status:      Spouse name: N/A   • Number of children: N/A   • Years of education: N/A     Social History Main Topics   • Smoking status: Never Smoker   • Smokeless tobacco: Never Used   • Alcohol use Yes   • Drug use: None   • Sexual activity: Not Asked     Other Topics Concern   • None     Social History Narrative           Objective   Physical Exam   Constitutional: He is oriented to person, place, and time. He appears well-developed and well-nourished. No distress.   HENT:   Head: Normocephalic and atraumatic.   Eyes: Conjunctivae and EOM are normal.   Neck: Normal range of motion. Neck supple. No JVD present.   Cardiovascular: Normal rate, regular rhythm, normal heart sounds and intact distal pulses.  Exam reveals no gallop and no friction rub.    No murmur heard.  Pulmonary/Chest: Effort normal and breath sounds normal. No respiratory distress. He has no wheezes. He has no rales. He exhibits tenderness (mild right).   Abdominal: Soft. Bowel sounds are normal. He exhibits no distension and no mass. There is no tenderness. There is no rebound and no guarding.   Musculoskeletal: Normal range of motion. He exhibits edema (3+ bilateral, nonpitting.  ).   Neurological: He is alert and oriented to person, place, and time.   Skin: Skin is warm and dry.   Psychiatric: He has a normal mood and affect. His behavior is normal. Judgment and thought content normal.   Nursing note and vitals reviewed.      Procedures         ED Course  ED Course      Labs Reviewed   COMPREHENSIVE METABOLIC PANEL - Abnormal; Notable for the following:        Result Value    Total Bilirubin 2.0 (*)     All  other components within normal limits   D-DIMER, QUANTITATIVE - Abnormal; Notable for the following:     D-Dimer, Quantitative 494 (*)     All other components within normal limits    Narrative:     Dimer values <500 ng/ml FEU are FDA approved as aid in diagnosis of deep venous thrombosis and pulmonary embolism.  This test should not be used in an exclusion strategy with pretest probability alone.    A recent guideline regarding diagnosis for pulmonary thomboembolism recommends an adjusted exclusion criterion of age x 10 ng/ml FEU for patients >50 years of age ([employer] Intern Med 2015; 163: 701-711).   BNP (IN-HOUSE) - Normal   TROPONIN (IN-HOUSE) - Normal   CBC WITH AUTO DIFFERENTIAL - Normal   CK - Normal   CK MB - Normal   RAINBOW DRAW    Narrative:     The following orders were created for panel order [place] Draw.  Procedure                               Abnormality         Status                     ---------                               -----------         ------                     Light Blue Top[[id number]]                                   Final result               Green Top (Gel)[[id number]]                                  Final result               Lavender Top[[id number]]                                     Final result               Gold Top - SST[[id number]]                                   Final result                 Please view results for these tests on the individual orders.   CBC AND DIFFERENTIAL    Narrative:     The following orders were created for panel order CBC & Differential.  Procedure                               Abnormality         Status                     ---------                               -----------         ------                     CBC Auto Differential[[id number]]        Normal              Final result                 Please view results for these tests on the individual orders.   LIGHT BLUE TOP   GREEN TOP   LAVENDER TOP   GOLD TOP - SST       US Venous Doppler Lower  Extremity Bilateral (duplex)   Final Result   CONCLUSION:      No evidence of deep venous thrombosis within the   femoral-popliteal system of the bilateral lower extremities.      Electronically signed by:  Sarah Chapman MD  9/1/2017 11:15 PM CDT   Workstation: [id number]      CT Angiogram Chest With Contrast   Final Result   1. Bolus timing is suboptimal for evaluation of pulmonary embolus   with no definite evidence of pulmonary embolus noted.   2. Coronary artery calcifications.   3. Otherwise essentially unremarkable.      Electronically signed by:  David Vasquez  9/1/2017 10:29 PM   CDT Workstation: [id number]      XR Chest 2 View   Final Result   No acute disease.      Electronically signed by:  David Vasquez  9/1/2017 9:17 PM CDT   Workstation: [id number]        No pneumonia, no DVT, no PE.  Normal stable VS.  Will discharge to outpatient followup and primary care referral given to establish a primary care physician.                MDM    Final diagnoses:   Shortness of breath   Pleurisy            Mariano Lozano MD  09/02/17 0022       Mariano Lozano MD  09/02/17 0130

## 2017-09-02 NOTE — ED NOTES
Pt. Presents to the ED with complaints of SOA since yesterday.  Pt. States history of PE and DVT stating also that he noticed some pain in the right calf.  Pt. States he had cellulitis on the effected leg as well recently so is unsure on side effects of DVT.  Pt. Hypertensive at triage.   VS otherwise stable.      Lizette Bursn RN  09/01/17 2016

## 2017-09-02 NOTE — DISCHARGE INSTRUCTIONS
Followup with the family medicine service as to establish a primary care provider.  Return with any new or worsening symptoms, or any concerns.

## 2017-09-05 ENCOUNTER — TELEPHONE (OUTPATIENT)
Dept: FAMILY MEDICINE CLINIC | Facility: CLINIC | Age: 57
End: 2017-09-05

## 2017-10-12 ENCOUNTER — OFFICE VISIT (OUTPATIENT)
Dept: SLEEP MEDICINE | Facility: HOSPITAL | Age: 57
End: 2017-10-12

## 2017-10-12 VITALS
BODY MASS INDEX: 47.74 KG/M2 | SYSTOLIC BLOOD PRESSURE: 130 MMHG | HEART RATE: 82 BPM | DIASTOLIC BLOOD PRESSURE: 80 MMHG | WEIGHT: 315 LBS | HEIGHT: 68 IN | OXYGEN SATURATION: 98 %

## 2017-10-12 DIAGNOSIS — G47.33 OBSTRUCTIVE SLEEP APNEA, ADULT: Primary | ICD-10-CM

## 2017-10-12 PROCEDURE — 99203 OFFICE O/P NEW LOW 30 MIN: CPT | Performed by: INTERNAL MEDICINE

## 2017-10-12 NOTE — PROGRESS NOTES
New Patient Sleep Medicine Consultation    Encounter Date: 10/12/2017         Patient's PCP: Seb Dumont MD  Referring provider: No ref. provider found  Reason for consultation: known KYLAH    Uriah Willingham is a 57 y.o. male who presents to re-establish care and get new supplies. He was seen 05/15/2007 and had good control of SDB at that time. No other notes were seen in the computer. His bedtime is ~ 2200. He  falls asleep after 20-30 minutes, and is up 2-3 times per night. He wakes up ~ 0600. He drinks 1 cups of coffee, 1 teas, and 2 sodas per day. He drinks 0-1 alcoholic beverages per week. He does not smoke. He denies abnormal dreams, cataplexy, sleep paralysis, or hypnagogic hallucinations. He does not take sedatives or hypnotics. He has no sleepiness with driving. He does not nap. RLS sx are very mild     [place] - 8    CPAP Data:    Time frame: 08/15/2017 - 10/11/2017    Compliance 100 %  CPAP/APAP settings: ?  Average 90% pressure: ? cmH2O  Leak: ?  Average AHI ? events/hr  Mask type: nasal  Machine Type: [facility]        Past Medical History:   Diagnosis Date   • Arthritis    • Hypertension    • Obesity      Social History     Social History   • Marital status:      Spouse name: N/A   • Number of children: N/A   • Years of education: N/A     Occupational History   • Not on file.     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 Patient advised to discuss any positive ROS with PCP.      There were no vitals filed for this  "visit.  There is no height or weight on file to calculate BMI.      There were no vitals filed for this visit.  There is no height or weight on file to calculate BMI.  Neck circumference: 19\"            General: Alert. Cooperative. Well developed. No acute distress.             Head:  Normocephalic. Symmetrical. Atraumatic.              Eyes: Sclera clear. No icterus. PERRLA. Normal EOM.             Ears: No deformities. Normal hearing.             Nose: No septal deviation. No drainage.          Throat: No oral lesions. No thrush. Moist mucous membranes.    Tongue is enlarged    Dentition is fair       Pharynx: Posterior pharyngeal pillars are narrow    Mallampati score of IV (only hard palate visible)    Pharynx is nonerythematous   Chest Wall:  Normal shape. Symmetric expansion with respiration. No tenderness.             Neck:  Trachea midline.           Lungs:  Clear to auscultation bilaterally. No wheezes. No rhonchi. No rales. Respirations regular, even and unlabored.            Heart:  Regular rhythm and normal rate. Normal S1 and S2. No murmur.     Abdomen:  Soft, obese  Extremities:  Moves all extremities well. No edema. Previous cellulits          Pulses: Pulses palpable and equal bilaterally.               Skin: Dry. Intact. No bleeding. No rash.           Neuro: Moves all 4 extremities and cranial nerves grossly intact.  Psychiatric: Normal mood and affect.      Current Outpatient Prescriptions:   •  acetaminophen (TYLENOL) 500 MG tablet, Take 500 mg by mouth 2 (Two) Times a Day., Disp: , Rfl:   •  Apixaban (ELIQUIS PO), Take 1 tablet by mouth. 1 tablet as directed Oral, Disp: , Rfl:   •  aspirin 81 MG chewable tablet, Chew 81 mg Daily., Disp: , Rfl:   •  azithromycin (ZITHROMAX) 250 MG tablet, Take 1 tablet by mouth Daily. Take 2 tablets the first day, then 1 tablet daily for 4 days., Disp: 5 tablet, Rfl: 0  •  celecoxib (CeleBREX) 200 MG capsule, Take 200 mg by mouth Daily., Disp: , Rfl:   •  " magnesium oxide (MAGOX) 400 (241.3 MG) MG tablet tablet, Take 400 mg by mouth 2 (Two) Times a Day., Disp: , Rfl:   •  meclizine (ANTIVERT) 25 MG tablet, Take 1 tablet by mouth 3 (Three) Times a Day As Needed for dizziness., Disp: 30 tablet, Rfl: 0  •  metoprolol succinate XL (TOPROL-XL) 25 MG 24 hr tablet, Take 25 mg by mouth 2 (Two) Times a Day., Disp: , Rfl:   •  PredniSONE (DELTASONE) 10 MG (48) tablet pack, Take as directed, Disp: 48 each, Rfl: 0  •  spironolactone (ALDACTONE) 25 MG tablet, 25 mg Daily., Disp: , Rfl: 10    ASSESSMENT:  1. Obstructive sleep apnea   1. PSG on 02/01/2007 - AHI of 21  2. New supplies and machine  3. Follow up in 30-90d  2. Restless leg syndrome/ /Bower-Ekbom disease (RLS/WED)          This document has been electronically signed by Cleveland Glover MD on October 12, 2017         CC: Seb Dumont MD          No ref. provider found

## 2017-10-30 DIAGNOSIS — G47.33 OSA (OBSTRUCTIVE SLEEP APNEA): Primary | ICD-10-CM

## 2017-12-05 ENCOUNTER — OFFICE VISIT (OUTPATIENT)
Dept: ONCOLOGY | Facility: CLINIC | Age: 57
End: 2017-12-05

## 2017-12-05 ENCOUNTER — LAB (OUTPATIENT)
Dept: ONCOLOGY | Facility: HOSPITAL | Age: 57
End: 2017-12-05

## 2017-12-05 VITALS
HEIGHT: 68 IN | WEIGHT: 315 LBS | RESPIRATION RATE: 20 BRPM | TEMPERATURE: 98.1 F | BODY MASS INDEX: 47.74 KG/M2 | DIASTOLIC BLOOD PRESSURE: 80 MMHG | SYSTOLIC BLOOD PRESSURE: 129 MMHG | HEART RATE: 57 BPM

## 2017-12-05 DIAGNOSIS — G89.29 CHRONIC LEFT-SIDED LOW BACK PAIN WITH LEFT-SIDED SCIATICA: ICD-10-CM

## 2017-12-05 DIAGNOSIS — I26.99 OTHER ACUTE PULMONARY EMBOLISM: ICD-10-CM

## 2017-12-05 DIAGNOSIS — I26.99 OTHER PULMONARY EMBOLISM WITHOUT ACUTE COR PULMONALE, UNSPECIFIED CHRONICITY (HCC): ICD-10-CM

## 2017-12-05 DIAGNOSIS — M54.42 CHRONIC LEFT-SIDED LOW BACK PAIN WITH LEFT-SIDED SCIATICA: ICD-10-CM

## 2017-12-05 LAB
ALBUMIN SERPL-MCNC: 4 G/DL (ref 3.4–4.8)
ALBUMIN/GLOB SERPL: 1.3 G/DL (ref 1.1–1.8)
ALP SERPL-CCNC: 54 U/L (ref 38–126)
ALT SERPL W P-5'-P-CCNC: 29 U/L (ref 21–72)
ANION GAP SERPL CALCULATED.3IONS-SCNC: 11 MMOL/L (ref 5–15)
AST SERPL-CCNC: 25 U/L (ref 17–59)
BASOPHILS # BLD AUTO: 0.02 10*3/MM3 (ref 0–0.2)
BASOPHILS NFR BLD AUTO: 0.3 % (ref 0–2)
BILIRUB SERPL-MCNC: 1.6 MG/DL (ref 0.2–1.3)
BUN BLD-MCNC: 17 MG/DL (ref 7–21)
BUN/CREAT SERPL: 14.7 (ref 7–25)
CALCIUM SPEC-SCNC: 9.5 MG/DL (ref 8.4–10.2)
CHLORIDE SERPL-SCNC: 105 MMOL/L (ref 95–110)
CO2 SERPL-SCNC: 25 MMOL/L (ref 22–31)
CREAT BLD-MCNC: 1.16 MG/DL (ref 0.7–1.3)
DEPRECATED RDW RBC AUTO: 39.8 FL (ref 35.1–43.9)
EOSINOPHIL # BLD AUTO: 0.13 10*3/MM3 (ref 0–0.7)
EOSINOPHIL NFR BLD AUTO: 2.1 % (ref 0–7)
ERYTHROCYTE [DISTWIDTH] IN BLOOD BY AUTOMATED COUNT: 12.4 % (ref 11.5–14.5)
GFR SERPL CREATININE-BSD FRML MDRD: 65 ML/MIN/1.73 (ref 60–130)
GLOBULIN UR ELPH-MCNC: 3.2 GM/DL (ref 2.3–3.5)
GLUCOSE BLD-MCNC: 106 MG/DL (ref 60–100)
HCT VFR BLD AUTO: 46.3 % (ref 39–49)
HGB BLD-MCNC: 16.3 G/DL (ref 13.7–17.3)
IMM GRANULOCYTES # BLD: 0.03 10*3/MM3 (ref 0–0.02)
IMM GRANULOCYTES NFR BLD: 0.5 % (ref 0–0.5)
LYMPHOCYTES # BLD AUTO: 2.37 10*3/MM3 (ref 0.6–4.2)
LYMPHOCYTES NFR BLD AUTO: 37.6 % (ref 10–50)
MCH RBC QN AUTO: 31.2 PG (ref 26.5–34)
MCHC RBC AUTO-ENTMCNC: 35.2 G/DL (ref 31.5–36.3)
MCV RBC AUTO: 88.7 FL (ref 80–98)
MONOCYTES # BLD AUTO: 0.51 10*3/MM3 (ref 0–0.9)
MONOCYTES NFR BLD AUTO: 8.1 % (ref 0–12)
NEUTROPHILS # BLD AUTO: 3.24 10*3/MM3 (ref 2–8.6)
NEUTROPHILS NFR BLD AUTO: 51.4 % (ref 37–80)
PLATELET # BLD AUTO: 167 10*3/MM3 (ref 150–450)
PMV BLD AUTO: 10.3 FL (ref 8–12)
POTASSIUM BLD-SCNC: 3.9 MMOL/L (ref 3.5–5.1)
PROT SERPL-MCNC: 7.2 G/DL (ref 6.3–8.6)
RBC # BLD AUTO: 5.22 10*6/MM3 (ref 4.37–5.74)
SODIUM BLD-SCNC: 141 MMOL/L (ref 137–145)
WBC NRBC COR # BLD: 6.3 10*3/MM3 (ref 3.2–9.8)

## 2017-12-05 PROCEDURE — 99214 OFFICE O/P EST MOD 30 MIN: CPT | Performed by: NURSE PRACTITIONER

## 2017-12-05 PROCEDURE — G0463 HOSPITAL OUTPT CLINIC VISIT: HCPCS | Performed by: NURSE PRACTITIONER

## 2017-12-05 PROCEDURE — 85025 COMPLETE CBC W/AUTO DIFF WBC: CPT

## 2017-12-05 PROCEDURE — 80053 COMPREHEN METABOLIC PANEL: CPT

## 2017-12-05 RX ORDER — PREDNISONE 10 MG/1
TABLET ORAL
Refills: 0 | COMMUNITY
Start: 2017-09-06 | End: 2017-12-05

## 2017-12-05 NOTE — PROGRESS NOTES
DATE OF VISIT: 12/5/2017    REASON FOR VISIT:  6 month follow-up    HISTORY OF PRESENT ILLNESS:      57 yr old male with history of morbid obesity, osteoarthritis that affects bilateral hips and knees, and history of left lower extremity DVT and bilateral pulmonary embolism diagnosed in November 2016.    He was referred to [facility]. He had a hypercoaguable workup including prothrombin gene mutation, factor V Leiden, antithrombin III, protein C and S, anticardiolipin antibody, lupus anticoagulant all were negative. He had a repeat Ct angiogram and ultrasound of lower extremity and both were negative for any residual blood clot. It was felt his DVT and PE were related to his morbid obesity. At that time he had finished 6 months of Apixaban therapy and there is no clinical recommendation to continue anticoagulation longer than 6 mos. He was advised to take 81 mg Aspirin daily.    Pt is here today for 6 month follow-up. He did go to ER back in September for possible new blood clot, CT angiogram and doppler studies were negative.    PAST MEDICAL HISTORY:    Past Medical History:   Diagnosis Date   • Arthritis    • Hypertension    • Obesity        SOCIAL HISTORY:    Social History   Substance Use Topics   • Smoking status: Never Smoker   • Smokeless tobacco: Never Used   • Alcohol use Yes       Surgical History :  Past Surgical History:   Procedure Laterality Date   • APPENDECTOMY     • COLONOSCOPY  07/22/2014    Diverticulosis in sigmoid colon. Normal cecum & rectum.   • INJECTION OF MEDICATION  11/28/2016    Kenalog (2)      • INJECTION OF MEDICATION  07/26/2013    Rocephin (1)      • INJECTION OF MEDICATION  11/28/2016    Toradol (2)      • TONSILLECTOMY         ALLERGIES:    No Known Allergies    REVIEW OF SYSTEMS:      CONSTITUTIONAL:  No fever, chills, or night sweats.     HEENT:  No epistaxis, mouth sores, or difficulty swallowing.    RESPIRATORY:  No new shortness of breath or cough at  "present.    CARDIOVASCULAR:  No chest pain or palpitations.    GASTROINTESTINAL:  No abdominal pain, nausea, vomiting, or blood in the stool.    GENITOURINARY:  No dysuria or hematuria.    MUSCULOSKELETAL:  No any new back pain or arthralgias.     NEUROLOGICAL:  No tingling or numbness. No new headache or dizziness.     LYMPHATICS:  Denies any abnormal swollen and anywhere in the body.    SKIN:  Denies any new skin rash.    PHYSICAL EXAMINATION:      VITAL SIGNS:  /80  Pulse 57  Temp 98.1 °F (36.7 °C) (Temporal Artery )   Resp 20  Ht 172.7 cm (67.99\")  Wt (!) 171 kg (376 lb 1.7 oz)  BMI 57.2 kg/m2    GENERAL:  Not in any distress.    HEENT:  Normocephalic, Atraumatic.Mild Conjunctival pallor. No icterus.  No Facial Asymmetry noted.    NECK:  No adenopathy. No JVD.    RESPIRATORY:  Fair air entry bilateral. No rhonchi or wheezing.    CARDIOVASCULAR:  S1, S2. Regular rate and rhythm. No murmur or gallop appreciated.    ABDOMEN:  Soft, obese, nontender. Bowel sounds present in all four quadrants.  No organomegaly appreciated.    EXTREMITIES:  1+ edema bilaterally with chronic changes r/t chronic venous stasis. No calf tenderness.    NEUROLOGIC:  Alert, awake and oriented ×3.     SKIN : No new skin lesion identified  DIAGNOSTIC DATA:    Glucose   Date Value Ref Range Status   12/05/2017 106 (H) 60 - 100 mg/dL Final     Sodium   Date Value Ref Range Status   12/05/2017 141 137 - 145 mmol/L Final     Potassium   Date Value Ref Range Status   12/05/2017 3.9 3.5 - 5.1 mmol/L Final     CO2   Date Value Ref Range Status   12/05/2017 25.0 22.0 - 31.0 mmol/L Final     Chloride   Date Value Ref Range Status   12/05/2017 105 95 - 110 mmol/L Final     Anion Gap   Date Value Ref Range Status   12/05/2017 11.0 5.0 - 15.0 mmol/L Final     Creatinine   Date Value Ref Range Status   12/05/2017 1.16 0.70 - 1.30 mg/dL Final     BUN   Date Value Ref Range Status   12/05/2017 17 7 - 21 mg/dL Final     BUN/Creatinine Ratio "   Date Value Ref Range Status   12/05/2017 14.7 7.0 - 25.0 Final     Calcium   Date Value Ref Range Status   12/05/2017 9.5 8.4 - 10.2 mg/dL Final     eGFR Non  Amer   Date Value Ref Range Status   12/05/2017 65 >60 mL/min/1.73 Final     Alkaline Phosphatase   Date Value Ref Range Status   12/05/2017 54 38 - 126 U/L Final     Total Protein   Date Value Ref Range Status   12/05/2017 7.2 6.3 - 8.6 g/dL Final     ALT (SGPT)   Date Value Ref Range Status   12/05/2017 29 21 - 72 U/L Final     AST (SGOT)   Date Value Ref Range Status   12/05/2017 25 17 - 59 U/L Final     Total Bilirubin   Date Value Ref Range Status   12/05/2017 1.6 (H) 0.2 - 1.3 mg/dL Final     Albumin   Date Value Ref Range Status   12/05/2017 4.00 3.40 - 4.80 g/dL Final     Globulin   Date Value Ref Range Status   12/05/2017 3.2 2.3 - 3.5 gm/dL Final     A/G Ratio   Date Value Ref Range Status   12/05/2017 1.3 1.1 - 1.8 g/dL Final     Lab Results   Component Value Date    WBC 6.30 12/05/2017    HGB 16.3 12/05/2017    HCT 46.3 12/05/2017    MCV 88.7 12/05/2017     12/05/2017     Lab Results   Component Value Date    NEUTROABS 3.24 12/05/2017     No results found for: , LABCA2, AFPTM, HCGQUANT, , CHROMGRNA, [id number], CEA, REFLABREPO]        ASSESSMENT AND PLAN:      1. History of left lower extremity DVT and bilateral PE; negative hypercoaguable work-up; he completed 6 months of Apixaban therapy. At this time he will continue 81 mg Aspirin daily, I advised him to stay well hydrated and if he is going to be traveling for long periods he needs to take frequent stops and get out and move around. He voices understanding. We will not need to see him again, will discharge from our clinic today.    2. Health maintenance, he does not smoke and he had a colonoscopy in 2015.    3. Morbid obesity      This document has been signed by TUNDE Cazares on December 5, 2017 12:26 PM

## 2018-01-31 ENCOUNTER — OFFICE VISIT (OUTPATIENT)
Dept: SLEEP MEDICINE | Facility: HOSPITAL | Age: 58
End: 2018-01-31

## 2018-01-31 VITALS
DIASTOLIC BLOOD PRESSURE: 80 MMHG | WEIGHT: 315 LBS | BODY MASS INDEX: 47.74 KG/M2 | HEIGHT: 68 IN | SYSTOLIC BLOOD PRESSURE: 140 MMHG

## 2018-01-31 DIAGNOSIS — G47.33 OBSTRUCTIVE SLEEP APNEA, ADULT: Primary | ICD-10-CM

## 2018-01-31 PROBLEM — Z99.89 OSA ON CPAP: Status: ACTIVE | Noted: 2018-01-31

## 2018-01-31 PROCEDURE — 99213 OFFICE O/P EST LOW 20 MIN: CPT | Performed by: INTERNAL MEDICINE

## 2018-01-31 NOTE — PROGRESS NOTES
Sleep Clinic Follow Up    Date: 1/31/2018  Primary Care Physician: Seb Dumont MD      Interim History (1/3):  Since the last visit on 10/12/2017, patient has:      1)  KYLAH - Has remained compliant with CPAP. He denies mask and machine issues, headaches, pressures intolerance, or non-compliance. He denies abnormal dreams, sleep paralysis, nasal congestion, URI sx. He has occasional dry mouth - recommend increasing humidity to 4.    PAP Data:  Time frame: 11/01/2017 - 01/29/2018   Compliance 100 %  PAP range : 15-20 cm H2O  Average 90% pressure: 17.3 cmH2O  Leak: 10.5 minutes   Average AHI 0.7 events/hr  Mask type: nasal  DME: [facility]    Bed time: 2130  Sleep latency: 15 minutes  Number of times awakens during the night: 2-3  Wake time: 0600  Estimated total sleep time at night: 8 hours  Caffeine intake: 1 coffee, 32 oz of soda  Alcohol intake: none  Nap time: none  Sleepiness with Driving: none    [place] - 10    2) Patient denies RLS symptoms.     PMHx, FH, SH reviewed and pertinent changes are: muscle relaxer for shoulder pain    REVIEW OF SYSTEMS:   Negative for chest pain, fever, chills, SOA, abdominal pain. Smoking: none      Exam (6-11/12):    Vitals:    01/31/18 0856   BP: 140/80     HR - 80  RR - 15      Body mass index is 56.41 kg/(m^2). Patient's BMI is above normal parameters. Follow-up plan includes:  exercise counseling and referral to PT.      Gen:  No distress, conversant, pleasant, appears stated age, alert, oriented  Eyes:   Anicteric sclera, moist conjunctiva, no lid lag     PERRLA, EOMI   Heent:   NC/AT    Oropharynx clear, Mallampati 4    normal hearing  Lungs:  Normal effort, non-labored breathing    Clear to auscultation    CV:  Normal S1/S2, no murmur    no lower extremity edema  ABD:  Soft, normal bowel sounds    Psych:  Appropriate affect  Neuro:  CN 2-12 intact    Past Medical History:   Diagnosis Date   • Arthritis    • Hypertension    • Obesity        Current Outpatient  Prescriptions:   •  acetaminophen (TYLENOL) 500 MG tablet, Take 500 mg by mouth 2 (Two) Times a Day., Disp: , Rfl:   •  aspirin 81 MG chewable tablet, Chew 81 mg Daily., Disp: , Rfl:   •  celecoxib (CeleBREX) 200 MG capsule, Take 200 mg by mouth Daily., Disp: , Rfl:   •  cyclobenzaprine (FLEXERIL) 10 MG tablet, Take 1 tablet by mouth 3 (Three) Times a Day As Needed for Muscle Spasms., Disp: 30 tablet, Rfl: 0  •  magnesium oxide (MAGOX) 400 (241.3 MG) MG tablet tablet, Take 400 mg by mouth 2 (Two) Times a Day., Disp: , Rfl:   •  metoprolol succinate XL (TOPROL-XL) 25 MG 24 hr tablet, Take 25 mg by mouth 2 (Two) Times a Day., Disp: , Rfl:   •  spironolactone (ALDACTONE) 25 MG tablet, 25 mg Daily., Disp: , Rfl: 10      ASSESSMENT / PLAN:   1. Obstructive sleep apnea   1. PSG on 02/01/2007 - AHI of 21  2. Compliant, increase humiddity  3. Follow up in 1 year unless sx change in the interim period.  2. Restless leg syndrome/ /Bower-Ekbom disease (RLS/WED)   1. Mild - no therapy    Total time 15 min, more than half spent in face to face counseling and coordination of care.     This document has been electronically signed by Cleveland Glover MD on January 31, 2018         CC: Seb Dumont MD          No ref. provider found

## 2018-03-30 ENCOUNTER — OFFICE VISIT (OUTPATIENT)
Dept: FAMILY MEDICINE CLINIC | Facility: CLINIC | Age: 58
End: 2018-03-30

## 2018-03-30 ENCOUNTER — APPOINTMENT (OUTPATIENT)
Dept: LAB | Facility: HOSPITAL | Age: 58
End: 2018-03-30

## 2018-03-30 VITALS
HEIGHT: 68 IN | DIASTOLIC BLOOD PRESSURE: 90 MMHG | SYSTOLIC BLOOD PRESSURE: 128 MMHG | BODY MASS INDEX: 47.74 KG/M2 | WEIGHT: 315 LBS

## 2018-03-30 DIAGNOSIS — M75.42 IMPINGEMENT SYNDROME OF LEFT SHOULDER: Primary | ICD-10-CM

## 2018-03-30 DIAGNOSIS — R03.0 ELEVATED BLOOD PRESSURE READING: ICD-10-CM

## 2018-03-30 DIAGNOSIS — Z11.59 ENCOUNTER FOR HEPATITIS C SCREENING TEST FOR LOW RISK PATIENT: ICD-10-CM

## 2018-03-30 DIAGNOSIS — M25.561 CHRONIC PAIN OF BOTH KNEES: ICD-10-CM

## 2018-03-30 DIAGNOSIS — G89.29 CHRONIC PAIN OF BOTH KNEES: ICD-10-CM

## 2018-03-30 DIAGNOSIS — I87.2 VENOUS INSUFFICIENCY: ICD-10-CM

## 2018-03-30 DIAGNOSIS — M25.562 CHRONIC PAIN OF BOTH KNEES: ICD-10-CM

## 2018-03-30 LAB
ALBUMIN SERPL-MCNC: 4.1 G/DL (ref 3.4–4.8)
ALBUMIN UR-MCNC: 1.4 MG/L
ALBUMIN/GLOB SERPL: 1.2 G/DL (ref 1.1–1.8)
ALP SERPL-CCNC: 53 U/L (ref 38–126)
ALT SERPL W P-5'-P-CCNC: 32 U/L (ref 21–72)
ANION GAP SERPL CALCULATED.3IONS-SCNC: 14 MMOL/L (ref 5–15)
AST SERPL-CCNC: 38 U/L (ref 17–59)
BILIRUB SERPL-MCNC: 1.7 MG/DL (ref 0.2–1.3)
BUN BLD-MCNC: 15 MG/DL (ref 7–21)
BUN/CREAT SERPL: 18.3 (ref 7–25)
CALCIUM SPEC-SCNC: 9.3 MG/DL (ref 8.4–10.2)
CHLORIDE SERPL-SCNC: 100 MMOL/L (ref 95–110)
CHOLEST SERPL-MCNC: 188 MG/DL (ref 0–199)
CO2 SERPL-SCNC: 26 MMOL/L (ref 22–31)
CREAT BLD-MCNC: 0.82 MG/DL (ref 0.7–1.3)
GFR SERPL CREATININE-BSD FRML MDRD: 98 ML/MIN/1.73 (ref 56–130)
GLOBULIN UR ELPH-MCNC: 3.3 GM/DL (ref 2.3–3.5)
GLUCOSE BLD-MCNC: 112 MG/DL (ref 60–100)
HBA1C MFR BLD: 6.1 % (ref 4–5.6)
HCV AB SER DONR QL: NEGATIVE
HDLC SERPL-MCNC: 46 MG/DL (ref 60–200)
LDLC SERPL CALC-MCNC: 107 MG/DL (ref 0–129)
LDLC/HDLC SERPL: 2.33 {RATIO} (ref 0–3.55)
MAGNESIUM SERPL-MCNC: 1.9 MG/DL (ref 1.6–2.3)
POTASSIUM BLD-SCNC: 4.4 MMOL/L (ref 3.5–5.1)
PROT SERPL-MCNC: 7.4 G/DL (ref 6.3–8.6)
SODIUM BLD-SCNC: 140 MMOL/L (ref 137–145)
T4 FREE SERPL-MCNC: 1.13 NG/DL (ref 0.78–2.19)
TRIGL SERPL-MCNC: 173 MG/DL (ref 20–199)
TSH SERPL DL<=0.05 MIU/L-ACNC: 1.33 MIU/ML (ref 0.46–4.68)
VLDLC SERPL-MCNC: 34.6 MG/DL

## 2018-03-30 PROCEDURE — 84439 ASSAY OF FREE THYROXINE: CPT | Performed by: FAMILY MEDICINE

## 2018-03-30 PROCEDURE — 83036 HEMOGLOBIN GLYCOSYLATED A1C: CPT | Performed by: FAMILY MEDICINE

## 2018-03-30 PROCEDURE — 36415 COLL VENOUS BLD VENIPUNCTURE: CPT | Performed by: FAMILY MEDICINE

## 2018-03-30 PROCEDURE — 82043 UR ALBUMIN QUANTITATIVE: CPT | Performed by: FAMILY MEDICINE

## 2018-03-30 PROCEDURE — 80061 LIPID PANEL: CPT | Performed by: FAMILY MEDICINE

## 2018-03-30 PROCEDURE — 99214 OFFICE O/P EST MOD 30 MIN: CPT | Performed by: FAMILY MEDICINE

## 2018-03-30 PROCEDURE — 83735 ASSAY OF MAGNESIUM: CPT | Performed by: FAMILY MEDICINE

## 2018-03-30 PROCEDURE — 80053 COMPREHEN METABOLIC PANEL: CPT | Performed by: FAMILY MEDICINE

## 2018-03-30 PROCEDURE — 84443 ASSAY THYROID STIM HORMONE: CPT | Performed by: FAMILY MEDICINE

## 2018-03-30 PROCEDURE — 86803 HEPATITIS C AB TEST: CPT | Performed by: FAMILY MEDICINE

## 2018-03-30 NOTE — PROGRESS NOTES
Subjective   Uriah Willingham is a 57 y.o. male.     History of Present Illness   requesting routine evaluation and chronic arthritic pains.  Patient's had left shoulder impingement symptoms past 2 months off and on.  Is taking high-dose anti-inflammatories which encouraged to stop.  Also having bilateral chronic knee pain related to DJD.  States she's had injections in the past but still having problems.  Is a history of venous insufficiency and hospitalized once for cellulitis from same.  Past history noted.  Also has sleep apnea as outlined.  Has had a colonoscopy within the last 3 years.    The following portions of the patient's history were reviewed and updated as appropriate: allergies, current medications, past family history, past medical history, past social history, past surgical history and problem list.    Review of Systems   Constitutional: Negative for activity change, appetite change, fatigue and unexpected weight change.   HENT: Negative for trouble swallowing and voice change.    Eyes: Negative for redness and visual disturbance.   Respiratory: Negative for cough and wheezing.    Cardiovascular: Negative for chest pain and palpitations.   Gastrointestinal: Negative for abdominal pain, constipation, diarrhea, nausea and vomiting.   Genitourinary: Negative for urgency.   Musculoskeletal: Positive for arthralgias, back pain and joint swelling.   Neurological: Negative for syncope and headaches.   Hematological: Negative for adenopathy.   Psychiatric/Behavioral: Negative for sleep disturbance.       Objective   Physical Exam   Constitutional: He is oriented to person, place, and time. He appears well-developed.   HENT:   Head: Normocephalic.   Nose: Nose normal.   Eyes: Pupils are equal, round, and reactive to light.   Neck: Normal range of motion. No thyromegaly present.   Cardiovascular: Normal rate, regular rhythm, normal heart sounds and intact distal pulses.  Exam reveals no gallop and no  friction rub.    No murmur heard.  Pulmonary/Chest: Breath sounds normal.   Abdominal: Soft. He exhibits no distension and no mass. There is no tenderness.   Musculoskeletal: Normal range of motion.   Left hip point tender laterally painful abduction internal and external rotation.   normal pronator drift negative.  Both lower knees show mild crepitance with minimal effusion bilaterally.  Gait slightly antalgic.   Neurological: He is alert and oriented to person, place, and time. He has normal reflexes.   Reflex Scores:       Patellar reflexes are 2+ on the right side and 2+ on the left side.  Skin: Skin is warm and dry.   Bilateral venous changes chronic with bronzing.  No open wounds.   Psychiatric: He has a normal mood and affect. His speech is normal and behavior is normal.       Assessment/Plan   Problems Addressed this Visit        Cardiovascular and Mediastinum    Elevated blood pressure reading    Relevant Orders    Comprehensive Metabolic Panel    Lipid Panel With LDL / HDL Ratio    Magnesium    MicroAlbumin, Urine, Random - Urine, Clean Catch    Venous insufficiency       Digestive    BMI 50.0-59.9, adult    Relevant Orders    Hemoglobin A1c    Lipid Panel With LDL / HDL Ratio    T4, Free    TSH       Musculoskeletal and Integument    Chronic pain of both knees    Relevant Orders    Ambulatory Referral to Orthopedic Surgery       Other    Impingement syndrome of left shoulder - Primary    Relevant Orders    Ambulatory Referral to Orthopedic Surgery      Other Visit Diagnoses     Encounter for hepatitis C screening test for low risk patient        Relevant Orders    Hepatitis C Antibody         on wt loss measures and programs  Given diffuse arthritic neuralgia and arthritic components at already on high-dose anti-inflammatories.  We have counseled stopping same  consultation orthopedic CVA thing interventional needs to be done.  Counseled obvious nature of obesity causing a great deal of  problem.   blood pressures also been elevated.  Is on metoprolol with minimal effect.  Screening labs as outlined recheck 7-10 days.

## 2018-04-03 DIAGNOSIS — M25.561 PAIN IN BOTH KNEES, UNSPECIFIED CHRONICITY: Primary | ICD-10-CM

## 2018-04-03 DIAGNOSIS — M25.562 PAIN IN BOTH KNEES, UNSPECIFIED CHRONICITY: Primary | ICD-10-CM

## 2018-04-04 ENCOUNTER — OFFICE VISIT (OUTPATIENT)
Dept: ORTHOPEDIC SURGERY | Facility: CLINIC | Age: 58
End: 2018-04-04

## 2018-04-04 VITALS — BODY MASS INDEX: 47.74 KG/M2 | WEIGHT: 315 LBS | HEIGHT: 68 IN | OXYGEN SATURATION: 98 % | HEART RATE: 58 BPM

## 2018-04-04 DIAGNOSIS — IMO0002 BURSITIS/TENDONITIS, SHOULDER: ICD-10-CM

## 2018-04-04 DIAGNOSIS — M47.816 LUMBAR SPONDYLOSIS: ICD-10-CM

## 2018-04-04 DIAGNOSIS — M75.42 IMPINGEMENT SYNDROME OF LEFT SHOULDER: ICD-10-CM

## 2018-04-04 DIAGNOSIS — M25.512 LEFT SHOULDER PAIN, UNSPECIFIED CHRONICITY: Primary | ICD-10-CM

## 2018-04-04 DIAGNOSIS — M25.552 LEFT HIP PAIN: ICD-10-CM

## 2018-04-04 DIAGNOSIS — M25.562 LEFT KNEE PAIN, UNSPECIFIED CHRONICITY: ICD-10-CM

## 2018-04-04 PROCEDURE — 20610 DRAIN/INJ JOINT/BURSA W/O US: CPT | Performed by: ORTHOPAEDIC SURGERY

## 2018-04-04 PROCEDURE — 99204 OFFICE O/P NEW MOD 45 MIN: CPT | Performed by: ORTHOPAEDIC SURGERY

## 2018-04-04 NOTE — PROGRESS NOTES
Uriah Willingham is a 57 y.o. male   Primary provider:  Seb Dumont MD       Chief Complaint   Patient presents with   • Left Shoulder - Pain   • Left Hip - Pain   • Left Knee - Pain       HISTORY OF PRESENT ILLNESS: Patient is here today for left shoulder pain and left hip and knee pain. Patient states that his left shoulder has been causing him pain for approximately 2 months. He states that his knees have been giving him pain for years. He was sent to [facility] upon arrival. He essentially has 3 problems.  One is the knees and he receives injections of Toradol and Kenalog, 3 month basis and has quite some time for known degenerative arthritis.  He also has hip pain and left side worse than right which is not helped.  The steroid shots have not helped him is occasional tingling mainly in his left foot but notices no numbness or weakness no history of prior problems.  Also has pain with activity in his left nondominant shoulder hurts with activity better with rest wake him at night radiates down the arm described as sharp in nature and severe is worse with activity and better with rest.  This is likely also had no treatment at this point.    History of Present Illness     CONCURRENT MEDICAL HISTORY:    Past Medical History:   Diagnosis Date   • Arthritis    • Hypertension    • Obesity        No Known Allergies      Current Outpatient Prescriptions:   •  acetaminophen (TYLENOL) 500 MG tablet, Take 500 mg by mouth 2 (Two) Times a Day., Disp: , Rfl:   •  aspirin 81 MG chewable tablet, Chew 81 mg Daily., Disp: , Rfl:   •  celecoxib (CeleBREX) 200 MG capsule, Take 200 mg by mouth Daily., Disp: , Rfl:   •  cyclobenzaprine (FLEXERIL) 10 MG tablet, Take 1 tablet by mouth 3 (Three) Times a Day As Needed for Muscle Spasms., Disp: 30 tablet, Rfl: 0  •  magnesium oxide (MAGOX) 400 (241.3 MG) MG tablet tablet, Take 400 mg by mouth 2 (Two) Times a Day., Disp: , Rfl:   •  metoprolol succinate XL (TOPROL-XL) 25 MG 24 hr  "tablet, Take 25 mg by mouth 2 (Two) Times a Day., Disp: , Rfl:     Past Surgical History:   Procedure Laterality Date   • APPENDECTOMY     • COLONOSCOPY  07/22/2014    Diverticulosis in sigmoid colon. Normal cecum & rectum.   • INJECTION OF MEDICATION  11/28/2016    Kenalog (2)      • INJECTION OF MEDICATION  07/26/2013    Rocephin (1)      • INJECTION OF MEDICATION  11/28/2016    Toradol (2)      • TONSILLECTOMY         Family History   Problem Relation Age of Onset   • COPD Mother    • Diabetes Father    • Heart disease Father         Social History     Social History   • Marital status:      Spouse name: N/A   • Number of children: N/A   • Years of education: N/A     Occupational History   • Not on file.     Social History Main Topics   • Smoking status: Never Smoker   • Smokeless tobacco: Never Used   • Alcohol use 0.6 oz/week     1 Cans of beer per week   • Drug use: No   • Sexual activity: Yes     Other Topics Concern   • Not on file     Social History Narrative   • No narrative on file        Review of Systems   Constitutional: Negative for chills and fever.   HENT: Negative for facial swelling.    Eyes: Negative for photophobia.   Respiratory: Negative for apnea and shortness of breath.    Cardiovascular: Positive for leg swelling. Negative for chest pain.   Gastrointestinal: Negative for abdominal pain, nausea and vomiting.   Genitourinary: Negative for dysuria.   Musculoskeletal: Positive for arthralgias, back pain, gait problem, joint swelling and myalgias.   Skin: Negative for color change and rash.   Neurological: Negative for seizures and syncope.   Psychiatric/Behavioral: Negative for behavioral problems and dysphoric mood.       PHYSICAL EXAMINATION:       Pulse 58   Ht 172.7 cm (68\")   Wt (!) 170 kg (374 lb)   SpO2 98%   BMI 56.87 kg/m²     Physical Exam   Constitutional: He is oriented to person, place, and time. He appears well-developed and well-nourished.   Morbidly obese   HENT: "   Head: Normocephalic and atraumatic.   Eyes: EOM are normal. Pupils are equal, round, and reactive to light.   Neck: Neck supple.   Pulmonary/Chest: Effort normal and breath sounds normal.   Musculoskeletal: Normal range of motion. He exhibits edema, tenderness and deformity.   Neurological: He is alert and oriented to person, place, and time.   Skin: Skin is warm and dry. No erythema.   Psychiatric: He has a normal mood and affect. Thought content normal.   Vitals reviewed.      GAIT:     []  Normal  [x]  Antalgic    Assistive device: [x]  None  []  Walker     []  Crutches  []  Cane     []  Wheelchair  []  Stretcher    Ortho Exam positive impingement of the left shoulder prominence but nontender over the AC joint on the left.  Resistance of the supraspinatus and infraspinatus.  Subscap is intact.  Good passive range of motion.    Good internal/external motion of both hips without pain.  Straight raising is negative but mild flexion contracture right knee greater than left.  Able to toe walk and heel walk.  Notch left greater than right.    Varus deformity of both knees.  Mild flexion contracture right greater than left.  Venous stasis changes prominent both lower extremities with 2+ pitting edema.  Grossly neurovascularly intact distally.  Xr Shoulder 2+ View Left    Result Date: 4/4/2018  Narrative: 3 views left shoulder Comparisons none AC joint arthritis with inferior spurring.  Type II acromion.  Sclerotic changes greater tuberosity. Mild cystic change humeral head.  Glenohumeral joint appears well-maintained. Impression: Evidence of likely impingement left shoulder with AC joint arthritis.    Xr Knee 1 Or 2 Vw Left    Result Date: 4/4/2018  Narrative: Lateral view knee Comparisons none Severe osteoarthritis patellofemoral joint and bone-on-bone on the lateral film femorotibial joint as well Impression: Severe DJD left knee    Xr Knee Bilateral Ap Standing    Result Date: 4/4/2018  Narrative: AP standing x-ray  bilateral knees Comparisons none Severe bone-on-bone medial osteoarthritis of both knees right worse than left.  Right leg is somewhat rotated on this view. Impression: Severe osteoarthritis both knees    Xr Hip With Or Without Pelvis 2 - 3 View Left    Result Date: 4/4/2018  Narrative: 2 views left hip with AP pelvis Comparison none X-ray difficult to see because of body habitus.  Joint space appears well maintained at both right and left hip.  Severe osteoarthrosis in the lower lumbosacral spine with marked facet hypertrophy seen on this single AP view.  No acute process seen Impression: Severe lumbar spondylosis.  No acute process seen bilateral hip       Large Joint Arthrocentesis  Date/Time: 4/4/2018 12:57 PM  Consent given by: patient  Site marked: site marked  Timeout: Immediately prior to procedure a time out was called to verify the correct patient, procedure, equipment, support staff and site/side marked as required   Supporting Documentation  Indications: pain and diagnostic evaluation   Procedure Details  Location: shoulder - L subacromial bursa  Needle size: 22 G  Approach: lateral  Medications administered: 4 mL lidocaine 1 %, 2 mL triamcinolone acetonide 40 MG/ML  Patient tolerance: patient tolerated the procedure well with no immediate complications          ASSESSMENT:    Diagnoses and all orders for this visit:    Left shoulder pain, unspecified chronicity  -     XR Shoulder 2+ View Left; Future    Left hip pain  -     XR Hip With or Without Pelvis 2 - 3 View Left; Future  -     MRI Lumbar Spine Without Contrast; Future    Left knee pain, unspecified chronicity  -     Cancel: XR Knee 3 View Left; Future  -     MRI Lumbar Spine Without Contrast; Future    Impingement syndrome of left shoulder    Bursitis/tendonitis, shoulder    Lumbar spondylosis          PLAN certainly injections in her really helped his hip or his back.  He has some neurologic symptoms been going on for years and years and  actually limit his walking.  Her proceed for MRI scan which we will have to bend Crooks in an open scanner.  I'll inject the subacromial area left side with Xylocaine and Kenalog.  Ice tonight he has good initial relief with this.  We'll place him on Thera-Band exercises to start after his pain resolves.  We also counseled about his knee and his overall BMI and bony edema making very poor candidate for any surgical intervention of his knee which he understands as well we'll see him back after his MRI scan.  We spent quite some time talking with him today regarding all of his issues.    No Follow-up on file.    Aime Cedillo MD

## 2018-04-11 DIAGNOSIS — M25.552 LEFT HIP PAIN: ICD-10-CM

## 2018-04-11 DIAGNOSIS — M25.562 LEFT KNEE PAIN, UNSPECIFIED CHRONICITY: ICD-10-CM

## 2018-04-16 ENCOUNTER — OFFICE VISIT (OUTPATIENT)
Dept: ORTHOPEDIC SURGERY | Facility: CLINIC | Age: 58
End: 2018-04-16

## 2018-04-16 ENCOUNTER — OFFICE VISIT (OUTPATIENT)
Dept: FAMILY MEDICINE CLINIC | Facility: CLINIC | Age: 58
End: 2018-04-16

## 2018-04-16 VITALS
BODY MASS INDEX: 47.74 KG/M2 | WEIGHT: 315 LBS | DIASTOLIC BLOOD PRESSURE: 80 MMHG | HEIGHT: 68 IN | SYSTOLIC BLOOD PRESSURE: 142 MMHG

## 2018-04-16 VITALS — OXYGEN SATURATION: 95 % | BODY MASS INDEX: 47.74 KG/M2 | WEIGHT: 315 LBS | HEIGHT: 68 IN | HEART RATE: 84 BPM

## 2018-04-16 DIAGNOSIS — I10 ESSENTIAL HYPERTENSION, BENIGN: ICD-10-CM

## 2018-04-16 DIAGNOSIS — M75.42 IMPINGEMENT SYNDROME OF LEFT SHOULDER: ICD-10-CM

## 2018-04-16 DIAGNOSIS — M47.816 LUMBAR SPONDYLOSIS: Primary | ICD-10-CM

## 2018-04-16 DIAGNOSIS — E11.9 TYPE 2 DIABETES MELLITUS WITHOUT COMPLICATION, WITHOUT LONG-TERM CURRENT USE OF INSULIN (HCC): Primary | ICD-10-CM

## 2018-04-16 DIAGNOSIS — M25.562 CHRONIC PAIN OF BOTH KNEES: ICD-10-CM

## 2018-04-16 DIAGNOSIS — G89.29 CHRONIC PAIN OF BOTH KNEES: ICD-10-CM

## 2018-04-16 DIAGNOSIS — M25.561 CHRONIC PAIN OF BOTH KNEES: ICD-10-CM

## 2018-04-16 PROBLEM — IMO0002 BURSITIS/TENDONITIS, SHOULDER: Status: RESOLVED | Noted: 2018-04-04 | Resolved: 2018-04-16

## 2018-04-16 PROBLEM — M25.552 LEFT HIP PAIN: Chronic | Status: ACTIVE | Noted: 2018-04-04

## 2018-04-16 PROBLEM — R03.0 ELEVATED BLOOD PRESSURE READING: Chronic | Status: RESOLVED | Noted: 2018-03-30 | Resolved: 2018-04-16

## 2018-04-16 PROBLEM — R03.0 ELEVATED BLOOD PRESSURE READING: Chronic | Status: ACTIVE | Noted: 2018-03-30

## 2018-04-16 PROBLEM — G47.33 OSA ON CPAP: Chronic | Status: ACTIVE | Noted: 2018-01-31

## 2018-04-16 PROBLEM — Z99.89 OSA ON CPAP: Chronic | Status: ACTIVE | Noted: 2018-01-31

## 2018-04-16 PROBLEM — I87.2 VENOUS INSUFFICIENCY: Chronic | Status: ACTIVE | Noted: 2018-03-30

## 2018-04-16 PROCEDURE — 99213 OFFICE O/P EST LOW 20 MIN: CPT | Performed by: ORTHOPAEDIC SURGERY

## 2018-04-16 PROCEDURE — 99214 OFFICE O/P EST MOD 30 MIN: CPT | Performed by: FAMILY MEDICINE

## 2018-04-16 RX ORDER — LISINOPRIL 5 MG/1
5 TABLET ORAL DAILY
Qty: 90 TABLET | Refills: 3 | Status: SHIPPED | OUTPATIENT
Start: 2018-04-16 | End: 2018-10-03 | Stop reason: DRUGHIGH

## 2018-04-16 RX ORDER — METFORMIN HYDROCHLORIDE 500 MG/1
500 TABLET, EXTENDED RELEASE ORAL
Qty: 90 TABLET | Refills: 3 | Status: SHIPPED | OUTPATIENT
Start: 2018-04-16 | End: 2018-10-08 | Stop reason: HOSPADM

## 2018-04-16 RX ORDER — SPIRONOLACTONE 25 MG/1
25 TABLET ORAL DAILY
COMMUNITY

## 2018-04-16 NOTE — PROGRESS NOTES
Subjective   Uriah Alexandria Willingham is a 57 y.o. male.     History of Present Illness  all up laboratory work.  Labs revealed early type 2 diabetes.  Also note forgot to mention is on spironolactone from cardiology.  Counseled on same.  Counseled extensively on diet exercise weight loss.  Negative for diabetic educator.  Also start low-dose [place] help with blood pressure.  Goal being to eventually wean off of beta blocker.  Start metformin given past history recheck again 4 months with hemoglobin A1c prior.    The following portions of the patient's history were reviewed and updated as appropriate: allergies, current medications, past family history, past medical history, past social history, past surgical history and problem list.    Review of Systems   Constitutional: Negative for activity change, appetite change, fatigue and unexpected weight change.   HENT: Negative for trouble swallowing and voice change.    Eyes: Negative for redness and visual disturbance.   Respiratory: Negative for cough and wheezing.    Cardiovascular: Negative for chest pain and palpitations.   Gastrointestinal: Negative for abdominal pain, constipation, diarrhea, nausea and vomiting.   Genitourinary: Negative for urgency.   Musculoskeletal: Positive for arthralgias, back pain and joint swelling.   Neurological: Negative for syncope and headaches.   Hematological: Negative for adenopathy.   Psychiatric/Behavioral: Negative for sleep disturbance.       Objective   Physical Exam   Constitutional: He appears well-developed.   HENT:   Head: Normocephalic.   Eyes: Pupils are equal, round, and reactive to light.   Neck: Normal range of motion.   Cardiovascular: Normal rate.    Pulmonary/Chest: Effort normal.   Abdominal: Soft.   Psychiatric: He has a normal mood and affect. His behavior is normal.       Assessment/Plan   Uriah was seen today for diabetes.    Diagnoses and all orders for this visit:    Type 2 diabetes mellitus without  complication, without long-term current use of insulin  -     metFORMIN ER (GLUCOPHAGE-XR) 500 MG 24 hr tablet; Take 1 tablet by mouth Daily With Breakfast.  -     Ambulatory Referral to Diabetic Education    Essential hypertension, benign  -     lisinopril (PRINIVIL,ZESTRIL) 5 MG tablet; Take 1 tablet by mouth Daily. For bp        above mentioned pathophysiology.  Also  on moving forward.  Recheck again 4 months hemoglobin A1c prior also be time to start pneumonia vaccine series

## 2018-04-16 NOTE — PROGRESS NOTES
"Uriah Willingham is a 57 y.o. male returns for     Chief Complaint   Patient presents with   • Results     MRI LUMBAR SPINE       HISTORY OF PRESENT ILLNESS: Patient is here today for MRI results of the lumbar spine. Patient had MRI at [facility]. Patient brought his disc with her and states that his lumbar spine pain is 4/10 today.He has a few days' relief of the shoulder injection is hurting again as well not quite as bad as it was.  Symptoms are still the same in his back and left leg.       CONCURRENT MEDICAL HISTORY:    Past Medical History:   Diagnosis Date   • Arthritis    • Hypertension    • Obesity        No Known Allergies      Current Outpatient Prescriptions:   •  acetaminophen (TYLENOL) 500 MG tablet, Take 500 mg by mouth 2 (Two) Times a Day., Disp: , Rfl:   •  aspirin 81 MG chewable tablet, Chew 81 mg Daily., Disp: , Rfl:   •  celecoxib (CeleBREX) 200 MG capsule, Take 200 mg by mouth Daily., Disp: , Rfl:   •  cyclobenzaprine (FLEXERIL) 10 MG tablet, Take 1 tablet by mouth 3 (Three) Times a Day As Needed for Muscle Spasms., Disp: 30 tablet, Rfl: 0  •  magnesium oxide (MAGOX) 400 (241.3 MG) MG tablet tablet, Take 400 mg by mouth 2 (Two) Times a Day., Disp: , Rfl:   •  metoprolol succinate XL (TOPROL-XL) 25 MG 24 hr tablet, Take 25 mg by mouth 2 (Two) Times a Day., Disp: , Rfl:     Past Surgical History:   Procedure Laterality Date   • APPENDECTOMY     • COLONOSCOPY  07/22/2014    Diverticulosis in sigmoid colon. Normal cecum & rectum.   • INJECTION OF MEDICATION  11/28/2016    Kenalog (2)      • INJECTION OF MEDICATION  07/26/2013    Rocephin (1)      • INJECTION OF MEDICATION  11/28/2016    Toradol (2)      • TONSILLECTOMY         ROS  No fevers or chills.  No chest pain or shortness of air.  No GI or  disturbances.    PHYSICAL EXAMINATION:       Pulse 84   Ht 172.7 cm (68\")   Wt (!) 170 kg (374 lb)   SpO2 95%   BMI 56.87 kg/m²     Physical Exam   Constitutional: He is oriented " to person, place, and time. He appears well-developed and well-nourished.   HENT:   Head: Normocephalic and atraumatic.   Eyes: EOM are normal. Pupils are equal, round, and reactive to light.   Neck: Neck supple.   Pulmonary/Chest: Effort normal.   Musculoskeletal: Normal range of motion. He exhibits no edema or deformity.   Neurological: He is alert and oriented to person, place, and time.   Skin: Skin is warm and dry. No erythema.   Psychiatric: He has a normal mood and affect. Thought content normal.   Vitals reviewed.      GAIT:     [x]  Normal  []  Antalgic    Assistive device: [x]  None  []  Walker     []  Crutches  []  Cane     []  Wheelchair  []  Stretcher    Ortho Exam decreased abduction.  Passive motion intact the shoulder positive impingement neurologically intact.  About the left sciatic notch straight raising is negative grossly neurologically intact.  Xr Shoulder 2+ View Left    Result Date: 4/4/2018  Narrative: 3 views left shoulder Comparisons none AC joint arthritis with inferior spurring.  Type II acromion.  Sclerotic changes greater tuberosity. Mild cystic change humeral head.  Glenohumeral joint appears well-maintained. Impression: Evidence of likely impingement left shoulder with AC joint arthritis.    Xr Knee 1 Or 2 Vw Left    Result Date: 4/4/2018  Narrative: Lateral view knee Comparisons none Severe osteoarthritis patellofemoral joint and bone-on-bone on the lateral film femorotibial joint as well Impression: Severe DJD left knee    Xr Knee Bilateral Ap Standing    Result Date: 4/4/2018  Narrative: AP standing x-ray bilateral knees Comparisons none Severe bone-on-bone medial osteoarthritis of both knees right worse than left.  Right leg is somewhat rotated on this view. Impression: Severe osteoarthritis both knees    Xr Hip With Or Without Pelvis 2 - 3 View Left    Result Date: 4/4/2018  Narrative: 2 views left hip with AP pelvis Comparison none X-ray difficult to see because of body  habitus.  Joint space appears well maintained at both right and left hip.  Severe osteoarthrosis in the lower lumbosacral spine with marked facet hypertrophy seen on this single AP view.  No acute process seen Impression: Severe lumbar spondylosis.  No acute process seen bilateral hip     MRI, Lumbar spine without gadolinium   IMPRESSION: 1. At the L5-S1 level, a combination of degenerative change in the facets and disc space narrows at the left neural foramen with uncertain but doubtful impactt on the left L5 nerve root. As a precaution, please correlate.     2. Otherwise negative.   I have reviewed his MRI myself.  I think is very difficult to read there is motion artifact and to my reading very difficult to see he does have a disc protrusion left side L5-S1.      ASSESSMENT:    Diagnoses and all orders for this visit:    Lumbar spondylosis    Chronic pain of both knees    Impingement syndrome of left shoulder          PLAN I think MRIs.diagnostic enough to send him for epidural injection.  This will be a difficult epidural.  I have spoken to Dr. Sanchez about this patient a splint is a pain management doctor at [facility] that works the disease epidurals and I'll get him as needed MRI scan of the shoulder I'm concerned about the resolution in the open scanner that he had previously of his back.  I will see him back 2 weeks after his epidural and assess his graft.        No Follow-up on file.    Aime Cedillo MD

## 2018-04-17 ENCOUNTER — TELEPHONE (OUTPATIENT)
Dept: FAMILY MEDICINE CLINIC | Facility: CLINIC | Age: 58
End: 2018-04-17

## 2018-04-17 ENCOUNTER — OFFICE VISIT (OUTPATIENT)
Dept: ENDOCRINOLOGY | Facility: CLINIC | Age: 58
End: 2018-04-17

## 2018-04-17 DIAGNOSIS — R73.03 PREDIABETES: Primary | ICD-10-CM

## 2018-04-17 DIAGNOSIS — E11.9 TYPE 2 DIABETES MELLITUS WITHOUT COMPLICATION, WITHOUT LONG-TERM CURRENT USE OF INSULIN (HCC): Primary | ICD-10-CM

## 2018-04-17 RX ORDER — BLOOD-GLUCOSE METER
1 KIT MISCELLANEOUS DAILY
Qty: 1 EACH | Refills: 5 | Status: SHIPPED | OUTPATIENT
Start: 2018-04-17

## 2018-04-17 RX ORDER — LANCETS 28 GAUGE
EACH MISCELLANEOUS
Qty: 100 EACH | Refills: 4 | Status: SHIPPED | OUTPATIENT
Start: 2018-04-17

## 2018-04-17 NOTE — PROGRESS NOTES
Uriah Willingham is a 57 y.o. male referred for outpatient diabetes education by Dr. Dumont. Patient attended individual session 04/17/2018. Topics covered included:    1. Carbohydrate Counting   i. Provided patient with detailed carbohydrate counting guide   ii. Instructed patient to eat 60 grams of carbohydrate with each meal and 15-30 grams of carbohydrate for snacks   iii. Provided patient with list of non-starchy vegetables and foods that are low in carbohydrate for snacks and to incorporate with meals    iv. Reviewed carbohydrate-containing foods, standard serving sizes, measuring foods, and nutrition label reading.    v. Reviewed the difference between simple and complex carbohydrate. Encouraged patient to choose complex carbohydrates more often.     2. Pathophysiology of Diabetes   i. Reviewed disease process of type 2 diabetes   ii. Reviewed common conditions associated with uncontrolled diabetes (diabetic neuropathy, retinopathy, nephropathy, heart disease, skin infections, and kidney disease)   iii. Discussed hemoglobin A1c and blood sugar targets    3. Hypoglycemia   i. Reviewed the signs and symptoms of low blood sugar   ii. Explained a low blood sugar is a blood glucose reading below 70 mg/dl   iii. Provided patient with handout detailing proper treatment guidelines    4. Foot Care   i. Reviewed the importance of good foot care when living with diabetes   ii. Encouraged patient to look at top and bottom of feet daily   iii. Wash feet daily with warm water and soap; no Epsom salt soaks   iv. Lotion tops and bottoms of feet, but not between toes   v. Wear proper fitting shoes to prevent sores   vi. Follow up with primary physician for non-healing wounds or ulcers    5. Discussed lower-carb snacks and meals patient can take to work     Thank you Dr. Dumont for this referral.     Antonella Palm MS, RD, LD, CDE

## 2018-06-19 ENCOUNTER — OFFICE VISIT (OUTPATIENT)
Dept: FAMILY MEDICINE CLINIC | Facility: CLINIC | Age: 58
End: 2018-06-19

## 2018-06-19 VITALS
HEIGHT: 68 IN | SYSTOLIC BLOOD PRESSURE: 140 MMHG | BODY MASS INDEX: 47.74 KG/M2 | WEIGHT: 315 LBS | DIASTOLIC BLOOD PRESSURE: 80 MMHG

## 2018-06-19 DIAGNOSIS — M25.562 CHRONIC PAIN OF BOTH KNEES: Chronic | ICD-10-CM

## 2018-06-19 DIAGNOSIS — M25.561 CHRONIC PAIN OF BOTH KNEES: Chronic | ICD-10-CM

## 2018-06-19 DIAGNOSIS — G89.29 CHRONIC PAIN OF BOTH KNEES: Chronic | ICD-10-CM

## 2018-06-19 DIAGNOSIS — M25.552 LEFT HIP PAIN: Chronic | ICD-10-CM

## 2018-06-19 DIAGNOSIS — M47.816 LUMBAR SPONDYLOSIS: Chronic | ICD-10-CM

## 2018-06-19 PROBLEM — I10 ESSENTIAL HYPERTENSION, BENIGN: Chronic | Status: ACTIVE | Noted: 2018-04-16

## 2018-06-19 PROBLEM — E11.9 TYPE 2 DIABETES MELLITUS WITHOUT COMPLICATION, WITHOUT LONG-TERM CURRENT USE OF INSULIN: Chronic | Status: ACTIVE | Noted: 2018-04-17

## 2018-06-19 PROCEDURE — 99214 OFFICE O/P EST MOD 30 MIN: CPT | Performed by: FAMILY MEDICINE

## 2018-06-19 NOTE — PROGRESS NOTES
Subjective   Uriah Alexandria Willingham is a 57 y.o. male.     History of Present Illness    questing evaluation chronic knee and back pain.  Dressing orthopedics and pain management.  Have  need for visiting with his orthopedist history seeking a second opinion regards to joint replacement of the knees etc.  Have counseled on the head majority of his orthopedic problems related to mechanics as well as weight.  Also broached the subject of possible bariatric intervention given his comorbidities.  Majority of time spent counseling on lifestyle measures and health decisions.    The following portions of the patient's history were reviewed and updated as appropriate: allergies, current medications, past family history, past medical history, past social history, past surgical history and problem list.    Review of Systems   Constitutional: Negative for activity change, appetite change, fatigue and unexpected weight change.   HENT: Negative for trouble swallowing and voice change.    Eyes: Negative for redness and visual disturbance.   Respiratory: Negative for cough and wheezing.    Cardiovascular: Negative for chest pain and palpitations.   Gastrointestinal: Negative for abdominal pain, constipation, diarrhea, nausea and vomiting.   Genitourinary: Negative for urgency.   Musculoskeletal: Positive for arthralgias, back pain, gait problem, joint swelling and myalgias.   Neurological: Negative for syncope and headaches.   Hematological: Negative for adenopathy.   Psychiatric/Behavioral: Negative for sleep disturbance.       Objective   Physical Exam   Constitutional: He appears well-developed.   HENT:   Head: Normocephalic.   Eyes: Pupils are equal, round, and reactive to light.   Neck: Normal range of motion.   Cardiovascular: Normal rate.    Pulmonary/Chest: Effort normal.   Abdominal: Soft.   Psychiatric: He has a normal mood and affect. His speech is normal and behavior is normal.       Assessment/Plan   Uriah was  seen today for hip pain.    Diagnoses and all orders for this visit:    BMI 50.0-59.9, adult  -     Ambulatory Referral to Bariatric Surgery    Chronic pain of both knees    Left hip pain    Lumbar spondylosis       on the above keep regular appointment

## 2018-07-22 ENCOUNTER — HOSPITAL ENCOUNTER (OUTPATIENT)
Dept: CT IMAGING | Facility: HOSPITAL | Age: 58
Discharge: HOME | End: 2018-07-22
Admitting: NURSE PRACTITIONER

## 2018-07-22 PROCEDURE — 74176 CT ABD & PELVIS W/O CONTRAST: CPT

## 2018-07-26 ENCOUNTER — LAB (OUTPATIENT)
Dept: LAB | Facility: HOSPITAL | Age: 58
End: 2018-07-26

## 2018-07-26 DIAGNOSIS — I10 ESSENTIAL HYPERTENSION, BENIGN: ICD-10-CM

## 2018-07-26 LAB
ANION GAP SERPL CALCULATED.3IONS-SCNC: 11 MMOL/L (ref 5–15)
BUN BLD-MCNC: 18 MG/DL (ref 7–21)
BUN/CREAT SERPL: 20.9 (ref 7–25)
CALCIUM SPEC-SCNC: 9.4 MG/DL (ref 8.4–10.2)
CHLORIDE SERPL-SCNC: 105 MMOL/L (ref 95–110)
CO2 SERPL-SCNC: 23 MMOL/L (ref 22–31)
CREAT BLD-MCNC: 0.86 MG/DL (ref 0.7–1.3)
GFR SERPL CREATININE-BSD FRML MDRD: 92 ML/MIN/1.73 (ref 56–130)
GLUCOSE BLD-MCNC: 101 MG/DL (ref 60–100)
HBA1C MFR BLD: 5.9 % (ref 4–5.6)
MAGNESIUM SERPL-MCNC: 1.7 MG/DL (ref 1.6–2.3)
POTASSIUM BLD-SCNC: 4.1 MMOL/L (ref 3.5–5.1)
SODIUM BLD-SCNC: 139 MMOL/L (ref 137–145)

## 2018-07-26 PROCEDURE — 83735 ASSAY OF MAGNESIUM: CPT

## 2018-07-26 PROCEDURE — 83036 HEMOGLOBIN GLYCOSYLATED A1C: CPT

## 2018-07-26 PROCEDURE — 80048 BASIC METABOLIC PNL TOTAL CA: CPT

## 2018-07-26 PROCEDURE — 36415 COLL VENOUS BLD VENIPUNCTURE: CPT

## 2018-07-30 ENCOUNTER — OFFICE VISIT (OUTPATIENT)
Dept: BARIATRICS/WEIGHT MGMT | Facility: HOSPITAL | Age: 58
End: 2018-07-30

## 2018-07-30 ENCOUNTER — OFFICE VISIT (OUTPATIENT)
Dept: BARIATRICS/WEIGHT MGMT | Facility: CLINIC | Age: 58
End: 2018-07-30

## 2018-07-30 ENCOUNTER — OFFICE VISIT (OUTPATIENT)
Dept: FAMILY MEDICINE CLINIC | Facility: CLINIC | Age: 58
End: 2018-07-30

## 2018-07-30 VITALS
HEIGHT: 67 IN | WEIGHT: 315 LBS | SYSTOLIC BLOOD PRESSURE: 138 MMHG | BODY MASS INDEX: 49.44 KG/M2 | DIASTOLIC BLOOD PRESSURE: 62 MMHG

## 2018-07-30 VITALS
TEMPERATURE: 98.6 F | HEIGHT: 67 IN | BODY MASS INDEX: 49.44 KG/M2 | DIASTOLIC BLOOD PRESSURE: 67 MMHG | SYSTOLIC BLOOD PRESSURE: 158 MMHG | HEART RATE: 70 BPM | OXYGEN SATURATION: 98 % | WEIGHT: 315 LBS

## 2018-07-30 DIAGNOSIS — I10 ESSENTIAL HYPERTENSION, BENIGN: Chronic | ICD-10-CM

## 2018-07-30 DIAGNOSIS — G89.29 CHRONIC PAIN OF BOTH KNEES: Chronic | ICD-10-CM

## 2018-07-30 DIAGNOSIS — G47.33 OSA ON CPAP: Chronic | ICD-10-CM

## 2018-07-30 DIAGNOSIS — E11.9 TYPE 2 DIABETES MELLITUS WITHOUT COMPLICATION, WITHOUT LONG-TERM CURRENT USE OF INSULIN (HCC): Primary | Chronic | ICD-10-CM

## 2018-07-30 DIAGNOSIS — M25.562 CHRONIC PAIN OF BOTH KNEES: Chronic | ICD-10-CM

## 2018-07-30 DIAGNOSIS — M25.561 CHRONIC PAIN OF BOTH KNEES: Chronic | ICD-10-CM

## 2018-07-30 DIAGNOSIS — R10.9 FLANK PAIN: ICD-10-CM

## 2018-07-30 DIAGNOSIS — E11.9 TYPE 2 DIABETES MELLITUS WITHOUT COMPLICATION, WITHOUT LONG-TERM CURRENT USE OF INSULIN (HCC): Chronic | ICD-10-CM

## 2018-07-30 DIAGNOSIS — Z99.89 OSA ON CPAP: Chronic | ICD-10-CM

## 2018-07-30 PROCEDURE — 99214 OFFICE O/P EST MOD 30 MIN: CPT | Performed by: FAMILY MEDICINE

## 2018-07-30 PROCEDURE — 97802 MEDICAL NUTRITION INDIV IN: CPT

## 2018-07-30 PROCEDURE — 99203 OFFICE O/P NEW LOW 30 MIN: CPT | Performed by: SURGERY

## 2018-07-30 RX ORDER — TRAMADOL HYDROCHLORIDE 50 MG/1
50 TABLET ORAL EVERY 6 HOURS PRN
COMMUNITY
End: 2018-08-31

## 2018-07-30 RX ORDER — SENNOSIDES 8.6 MG
650 CAPSULE ORAL EVERY 8 HOURS PRN
COMMUNITY

## 2018-07-30 NOTE — PROGRESS NOTES
Patient Care Team:  Chirag Dumont MD as PCP - General (Family Medicine)  Jovani Hernández MD as Consulting Physician (Hematology and Oncology)  TUNDE Cazares as Nurse Practitioner (Oncology)    Reason for Visit:  Surgical Weight loss    Subjective     Patient is a 57 y.o. male presents with morbid obesity and his Body mass index is 58.98 kg/m².     He is here for discussion of surgical weight loss options.  He stated he has been with the disease of obesity for year(s).  He stated he suffers from joint pain, sleep apnea, diabetes, heartburn and hypertension due to his weight gain.  He stated that weight loss helps alleviate these symptoms.   He stated that he has tried self applied diets such as low carb diet plans to help with weight loss.  He stated that he has attempted these conservative methods for weight loss without maintaining long term success.  Today he would like to discuss other dietary recommendations as well as surgical weight loss options.     Review of Systems  General ROS: positive for  - fatigue  Psychological ROS: negative  Ophthalmic ROS: negative  ENT ROS: negative  Hematological and Lymphatic ROS: positive for - blood clots  Endocrine ROS: positive for - prediabetic  Respiratory ROS: no cough, shortness of breath, or wheezing  Cardiovascular ROS: no chest pain or dyspnea on exertion  positive for - edema  Gastrointestinal ROS: positive for - diarrhea and heartburn  Musculoskeletal ROS: positive for - joint pain    History  Past Medical History:   Diagnosis Date   • Ankle swelling    • Arthritis    • Back pain    • Diabetes mellitus (CMS/HCC)    • Heartburn    • History of blood clots    • Hypertension    • Joint pain    • Knee pain    • Muscle pain    • Obesity    • Sleep apnea     c pap     Past Surgical History:   Procedure Laterality Date   • APPENDECTOMY      lap   • COLONOSCOPY  07/22/2014    Diverticulosis in sigmoid colon. Normal cecum & rectum.   • INJECTION OF MEDICATION   11/28/2016    Kenalog (2)      • INJECTION OF MEDICATION  07/26/2013    Rocephin (1)      • INJECTION OF MEDICATION  11/28/2016    Toradol (2)      • TONSILLECTOMY     • WISDOM TOOTH EXTRACTION       Family History   Problem Relation Age of Onset   • COPD Mother    • Heart disease Father    • Cancer Father      Social History   Substance Use Topics   • Smoking status: Never Smoker   • Smokeless tobacco: Never Used   • Alcohol use 0.6 oz/week     1 Cans of beer per week      Comment: socially       (Not in a hospital admission)  Allergies:  Patient has no known allergies.      Current Outpatient Prescriptions:   •  acetaminophen (TYLENOL) 650 MG 8 hr tablet, Take 650 mg by mouth Every 8 (Eight) Hours As Needed for Mild Pain ., Disp: , Rfl:   •  aspirin 81 MG chewable tablet, Chew 81 mg Daily., Disp: , Rfl:   •  celecoxib (CeleBREX) 200 MG capsule, Take 200 mg by mouth Daily., Disp: , Rfl:   •  gabapentin (NEURONTIN) 100 MG capsule, Take 300 mg by mouth 3 (Three) Times a Day., Disp: , Rfl:   •  Glucose Blood (FREESTYLE LITE TEST VI), , Disp: , Rfl: 3  •  glucose blood test strip, Qd and prn, Disp: 100 each, Rfl: 4  •  glucose monitor monitoring kit, 1 each Daily. And prn, Disp: 1 each, Rfl: 5  •  Lancets (FREESTYLE) lancets, Qd and prn, Disp: 100 each, Rfl: 4  •  lisinopril (PRINIVIL,ZESTRIL) 5 MG tablet, Take 1 tablet by mouth Daily. For bp, Disp: 90 tablet, Rfl: 3  •  magnesium oxide (MAGOX) 400 (241.3 MG) MG tablet tablet, Take 400 mg by mouth 2 (Two) Times a Day., Disp: , Rfl:   •  metFORMIN ER (GLUCOPHAGE-XR) 500 MG 24 hr tablet, Take 1 tablet by mouth Daily With Breakfast., Disp: 90 tablet, Rfl: 3  •  metoprolol succinate XL (TOPROL-XL) 25 MG 24 hr tablet, Take 25 mg by mouth 2 (Two) Times a Day., Disp: , Rfl:   •  spironolactone (ALDACTONE) 25 MG tablet, Take 25 mg by mouth Daily., Disp: , Rfl:   •  traMADol (ULTRAM) 50 MG tablet, Take 50 mg by mouth Every 6 (Six) Hours As Needed for Moderate Pain ., Disp: ,  Rfl:     Objective     Vital Signs  Temp:  [98.6 °F (37 °C)] 98.6 °F (37 °C)  Heart Rate:  [70] 70  BP: (158)/(67) 158/67  Body mass index is 58.98 kg/m².  1    07/30/18  0907   Weight: (!) 170 kg (373 lb 12.8 oz)       Physical Exam:      HEENT: extra ocular movement intact  Respiratory: appears well, vitals normal, no respiratory distress, acyanotic, normal RR  Neurologic: alert, oriented, normal speech, no focal findings or movement disorder noted       Results Review:   None        Assessment/Plan   Encounter Diagnoses   Name Primary?   • BMI 50.0-59.9, adult (CMS/[facility]) Yes   • Type 2 diabetes mellitus without complication, without long-term current use of insulin (CMS/[facility])    • KYLAH on CPAP    • Essential hypertension, benign    • Chronic pain of both knees        A total of 30 minutes was spent face-to-face with this patient during this encounter and over half the time was spent on counseling and coordination of care for the treatment of this disease of obesity.  We taken the time to explain the etiology of the disease as well as modifications necessary to counteract/reverse this disease process.  We discussed surgery as an option as well.  Patient would first like to consider dietary modifications prior to consideration of surgery.  He has spoken with our dietitian as well.  Recommendations include changing his dietary protein intake by limiting the amount of carbohydrates as well as the excessive amounts of fat in his meals.  I recommended that he read labels as well as increase his physical activity as tolerated.  He is to follow-up with our program in 1 month's time with nurse practitioner for continued medically supervised weight loss.  I discussed the patient's findings and my recommendations with patient and his wife.         Dr. Sherwin Alvarez MD FACS    07/30/18  10:31 AM  Patient Care Team:  Chirag Dumont MD as PCP - General (Family Medicine)  Jovani Hernández MD as Consulting Physician (Hematology and  Oncology)  TUNDE Cazares as Nurse Practitioner (Oncology)

## 2018-07-30 NOTE — PROGRESS NOTES
Subjective   Uriah Willingham is a 57 y.o. male.     History of Present Illness    follow-up urgent care for flank pain.  Also time for sugar check.  Flank pain found to be probably either mechanical or passed a small stone.  CT scan was normal except for intrarenal calcifications.  Says pain went away spontaneously.  Lab work was improved hemoglobin A1c down to less than 6.  Is also been talking with bariatric surgeon.  Overall stable and improved.    The following portions of the patient's history were reviewed and updated as appropriate: allergies, current medications, past family history, past medical history, past social history, past surgical history and problem list.    Review of Systems   Constitutional: Negative for activity change, appetite change, fatigue and unexpected weight change.   HENT: Negative for trouble swallowing and voice change.    Eyes: Negative for redness and visual disturbance.   Respiratory: Negative for cough and wheezing.    Cardiovascular: Negative for chest pain and palpitations.   Gastrointestinal: Negative for abdominal pain, constipation, diarrhea, nausea and vomiting.   Genitourinary: Negative for urgency.   Musculoskeletal: Negative for joint swelling.   Neurological: Negative for syncope and headaches.   Hematological: Negative for adenopathy.   Psychiatric/Behavioral: Negative for sleep disturbance.       Objective   Physical Exam   Constitutional: He is oriented to person, place, and time. He appears well-developed.   HENT:   Head: Normocephalic.   Nose: Nose normal.   Eyes: Pupils are equal, round, and reactive to light.   Neck: Normal range of motion. No thyromegaly present.   Cardiovascular: Normal rate, regular rhythm, normal heart sounds and intact distal pulses.  Exam reveals no gallop and no friction rub.    No murmur heard.  Pulmonary/Chest: Breath sounds normal.   Abdominal: Soft. He exhibits no distension and no mass. There is no tenderness.   Musculoskeletal:  Normal range of motion.   Neurological: He is alert and oriented to person, place, and time. He has normal reflexes.   Skin: Skin is warm and dry.   Psychiatric: He has a normal mood and affect. His behavior is normal. Judgment and thought content normal.     Results for orders placed or performed in visit on 07/26/18   Hemoglobin A1c   Result Value Ref Range    Hemoglobin A1C 5.9 (H) 4 - 5.6 %   Basic Metabolic Panel   Result Value Ref Range    Glucose 101 (H) 60 - 100 mg/dL    BUN 18 7 - 21 mg/dL    Creatinine 0.86 0.70 - 1.30 mg/dL    Sodium 139 137 - 145 mmol/L    Potassium 4.1 3.5 - 5.1 mmol/L    Chloride 105 95 - 110 mmol/L    CO2 23.0 22.0 - 31.0 mmol/L    Calcium 9.4 8.4 - 10.2 mg/dL    eGFR Non  Amer 92 56 - 130 mL/min/1.73    BUN/Creatinine Ratio 20.9 7.0 - 25.0    Anion Gap 11.0 5.0 - 15.0 mmol/L   Magnesium   Result Value Ref Range    Magnesium 1.7 1.6 - 2.3 mg/dL       Assessment/Plan   Uriah was seen today for follow-up.    Diagnoses and all orders for this visit:    Type 2 diabetes mellitus without complication, without long-term current use of insulin (CMS/[facility])    Essential hypertension, benign    BMI 50.0-59.9, adult (CMS/[facility])    Flank pain       summertime hydration  continued weight loss.  Recheck 4 months betime for another hemoglobin A1c and a start pneumonia series

## 2018-08-31 ENCOUNTER — OFFICE VISIT (OUTPATIENT)
Dept: BARIATRICS/WEIGHT MGMT | Facility: CLINIC | Age: 58
End: 2018-08-31

## 2018-08-31 VITALS
TEMPERATURE: 98 F | SYSTOLIC BLOOD PRESSURE: 126 MMHG | BODY MASS INDEX: 49.44 KG/M2 | OXYGEN SATURATION: 99 % | HEIGHT: 67 IN | HEART RATE: 71 BPM | DIASTOLIC BLOOD PRESSURE: 76 MMHG | WEIGHT: 315 LBS

## 2018-08-31 DIAGNOSIS — I10 ESSENTIAL HYPERTENSION: ICD-10-CM

## 2018-08-31 DIAGNOSIS — E66.9 DIABETES MELLITUS TYPE 2 IN OBESE (HCC): ICD-10-CM

## 2018-08-31 DIAGNOSIS — E66.01 OBESITY, MORBID, BMI 50 OR HIGHER (HCC): Primary | ICD-10-CM

## 2018-08-31 DIAGNOSIS — G47.33 OSA ON CPAP: Chronic | ICD-10-CM

## 2018-08-31 DIAGNOSIS — Z99.89 OSA ON CPAP: Chronic | ICD-10-CM

## 2018-08-31 DIAGNOSIS — E11.69 DIABETES MELLITUS TYPE 2 IN OBESE (HCC): ICD-10-CM

## 2018-08-31 PROCEDURE — 99214 OFFICE O/P EST MOD 30 MIN: CPT | Performed by: NURSE PRACTITIONER

## 2018-08-31 RX ORDER — HYDROCODONE BITARTRATE AND ACETAMINOPHEN 5; 325 MG/1; MG/1
1 TABLET ORAL EVERY 6 HOURS PRN
COMMUNITY
End: 2018-10-03 | Stop reason: ALTCHOICE

## 2018-09-04 ENCOUNTER — TELEPHONE (OUTPATIENT)
Dept: FAMILY MEDICINE CLINIC | Facility: CLINIC | Age: 58
End: 2018-09-04

## 2018-09-04 RX ORDER — CELECOXIB 200 MG/1
200 CAPSULE ORAL DAILY
Qty: 90 CAPSULE | Refills: 1 | Status: SHIPPED | OUTPATIENT
Start: 2018-09-04 | End: 2018-10-22

## 2018-09-04 NOTE — TELEPHONE ENCOUNTER
KRYSTYNA WAITE IS NEEDING A REFILL ON THE CELEBREX TO BE SENT TO [facility] IN [place]  HE SAW THE [facility] LAST FRIDAY IN [place] AND SHE SUGGESTED HE ASK DR BRAMBILA TO PUT HIM IN [facility] INJECTIONS AS IT HELPS IN WEIGHT LOSS TOO  CALL THEM BACK  [phone]

## 2018-09-04 NOTE — TELEPHONE ENCOUNTER
DR BRAMBILA REFILLED THE CELEBREX BUT HE SAID HE WILL NEED TO BE SEEN FOR ANY NEW PRESCRIPTIONS. AND THAT INSURANCE WONT COVER IT. I DIDN'T GET AN ANSWER WHEN I CALLED.

## 2018-10-03 ENCOUNTER — OFFICE VISIT (OUTPATIENT)
Dept: BARIATRICS/WEIGHT MGMT | Facility: CLINIC | Age: 58
End: 2018-10-03

## 2018-10-03 ENCOUNTER — PREP FOR SURGERY (OUTPATIENT)
Dept: OTHER | Facility: HOSPITAL | Age: 58
End: 2018-10-03

## 2018-10-03 VITALS
BODY MASS INDEX: 49.44 KG/M2 | HEART RATE: 89 BPM | HEIGHT: 67 IN | OXYGEN SATURATION: 98 % | TEMPERATURE: 98.3 F | WEIGHT: 315 LBS | SYSTOLIC BLOOD PRESSURE: 145 MMHG | DIASTOLIC BLOOD PRESSURE: 77 MMHG

## 2018-10-03 DIAGNOSIS — R07.9 CHEST PAIN, UNSPECIFIED TYPE: Primary | ICD-10-CM

## 2018-10-03 DIAGNOSIS — E66.01 MORBID OBESITY WITH BMI OF 50.0-59.9, ADULT (HCC): Primary | ICD-10-CM

## 2018-10-03 DIAGNOSIS — G47.33 OSA ON CPAP: Chronic | ICD-10-CM

## 2018-10-03 DIAGNOSIS — I10 ESSENTIAL HYPERTENSION, BENIGN: Chronic | ICD-10-CM

## 2018-10-03 DIAGNOSIS — Z99.89 OSA ON CPAP: Chronic | ICD-10-CM

## 2018-10-03 PROCEDURE — 99213 OFFICE O/P EST LOW 20 MIN: CPT | Performed by: NURSE PRACTITIONER

## 2018-10-03 RX ORDER — SODIUM CHLORIDE 0.9 % (FLUSH) 0.9 %
1-10 SYRINGE (ML) INJECTION AS NEEDED
Status: CANCELLED | OUTPATIENT
Start: 2018-10-08

## 2018-10-03 RX ORDER — SODIUM CHLORIDE 0.9 % (FLUSH) 0.9 %
3 SYRINGE (ML) INJECTION EVERY 12 HOURS SCHEDULED
Status: CANCELLED | OUTPATIENT
Start: 2018-10-08

## 2018-10-03 RX ORDER — SODIUM CHLORIDE 9 MG/ML
75 INJECTION, SOLUTION INTRAVENOUS CONTINUOUS
Status: CANCELLED | OUTPATIENT
Start: 2018-10-08

## 2018-10-03 RX ORDER — GABAPENTIN 600 MG/1
600 TABLET ORAL 3 TIMES DAILY
Refills: 0 | COMMUNITY
Start: 2018-09-21 | End: 2018-10-22

## 2018-10-03 NOTE — PROGRESS NOTES
"Subjective   Uriah Willingham is a 58 y.o. male.     History of Present Illness   Uriah Willingham is here with morbid obesity and is being followed for medically supervised weight loss. He may consider surgery at a later date. This is the patient's 3rd visit to the office.  He has been short of breath and reports that he will be having a heart catheterization next week. Patient has lost another 18 pounds this month. Patient has been exercising by walking and doing weights for 30 minutes at least twice per week. Patient has been making health dietary choices and eating 3 meals per day with protein at each. Patient has been eating 75 grams of protein per day. Patient is drinking 80 ounces of water per day.   Vitals:    10/03/18 0905   BP: 145/77   BP Location: Right arm   Patient Position: Sitting   Cuff Size: Adult   Pulse: 89   Temp: 98.3 °F (36.8 °C)   SpO2: 98%   Weight: (!) 153 kg (336 lb 12.8 oz)   Height: 169.5 cm (66.75\")         The following portions of the patient's history were reviewed and updated as appropriate: allergies, current medications, past family history, past medical history, past social history, past surgical history and problem list.    Review of Systems   Constitutional: Negative for activity change, appetite change and fatigue.   HENT: Negative.    Eyes: Negative.    Respiratory: Positive for cough and shortness of breath. Negative for apnea and chest tightness.    Cardiovascular: Negative.  Negative for chest pain, palpitations and leg swelling.   Gastrointestinal: Negative.  Negative for abdominal pain, anal bleeding, constipation, nausea and vomiting.   Endocrine: Negative.    Genitourinary: Negative.    Musculoskeletal: Positive for arthralgias and back pain.   Skin: Negative.    Allergic/Immunologic: Negative.    Neurological: Negative.  Negative for seizures and syncope.   Hematological: Negative.  Does not bruise/bleed easily.        Hx of blood clots   Psychiatric/Behavioral: " Negative.  Negative for dysphoric mood, self-injury and sleep disturbance.       Objective   Physical Exam   Constitutional: He is oriented to person, place, and time. Vital signs are normal. He appears well-developed and well-nourished. He is cooperative. No distress.   HENT:   Head: Normocephalic and atraumatic.   Nose: Nose normal.   Mouth/Throat: Oropharynx is clear and moist. No oropharyngeal exudate or tonsillar abscesses.   Eyes: Pupils are equal, round, and reactive to light. Conjunctivae, EOM and lids are normal. Right eye exhibits no discharge. Left eye exhibits no discharge.   Neck: Trachea normal. Neck supple. No JVD present. Carotid bruit is not present. No neck rigidity. No tracheal deviation present. No thyromegaly present.   Cardiovascular: Normal rate, regular rhythm, S1 normal, S2 normal and normal heart sounds.    Pulmonary/Chest: Effort normal and breath sounds normal. No stridor. No respiratory distress. He has no wheezes. He has no rales.   Abdominal: Soft. Bowel sounds are normal. He exhibits no distension. There is no tenderness.   obese   Musculoskeletal: He exhibits edema.        Right shoulder: He exhibits normal strength.   1+ edema noted to lower legs; discoloration noted to bilateral lower legs; no open sores noted    Lymphadenopathy:     He has no cervical adenopathy.   Neurological: He is alert and oriented to person, place, and time. He has normal strength. No cranial nerve deficit.   Skin: Skin is warm, dry and intact. No rash noted.   Psychiatric: He has a normal mood and affect. His speech is normal and behavior is normal.   Alert and oriented x 3   Vitals reviewed.      Assessment/Plan   Uriah was seen today for weight loss, obesity and nutrition counseling.    Diagnoses and all orders for this visit:    Morbid obesity with BMI of 50.0-59.9, adult (CMS/[facility])  Comments:  [facility]    KYLAH on CPAP  Comments:  continue to wear nightly    Essential hypertension,  benign  Comments:  Controlled; continue current meds; FU with PCP          Uriah Willingham has done well this month with healthy changes. Patient has lost another 18 pounds. Today we discussed healthy changes in lifestyle, diet, and exercise.   Handout provided on exercise and stretch band provided.  Intensive behavioral therapy for obesity was done today.   Goals for this month are: 3 meals per day with protein at each; eat protein first, use smaller plate; exercise as advised once cardiac evaluation has been completed.   Follow up in one month for a weight recheck.

## 2018-10-03 NOTE — PATIENT INSTRUCTIONS
Uriahgaurav Willingham has done well this month with healthy changes. Patient has lost another 18 pounds. Today we discussed healthy changes in lifestyle, diet, and exercise.   Handout provided on exercise and stretch band provided.  Intensive behavioral therapy for obesity was done today.   Goals for this month are: 3 meals per day with protein at each; eat protein first, use smaller plate; exercise as advised once cardiac evaluation has been completed.   Follow up in one month for a weight recheck.

## 2018-10-08 ENCOUNTER — HOSPITAL ENCOUNTER (OUTPATIENT)
Facility: HOSPITAL | Age: 58
Setting detail: HOSPITAL OUTPATIENT SURGERY
Discharge: HOME | End: 2018-10-08
Attending: INTERNAL MEDICINE | Admitting: INTERNAL MEDICINE

## 2018-10-08 VITALS
DIASTOLIC BLOOD PRESSURE: 73 MMHG | SYSTOLIC BLOOD PRESSURE: 116 MMHG | BODY MASS INDEX: 49.44 KG/M2 | HEART RATE: 71 BPM | OXYGEN SATURATION: 93 % | TEMPERATURE: 97.2 F | WEIGHT: 315 LBS | RESPIRATION RATE: 20 BRPM | HEIGHT: 67 IN

## 2018-10-08 DIAGNOSIS — R07.9 CHEST PAIN, UNSPECIFIED TYPE: ICD-10-CM

## 2018-10-08 LAB
ANION GAP SERPL CALCULATED.3IONS-SCNC: 7 MMOL/L (ref 5–15)
BASOPHILS # BLD AUTO: 0.01 10*3/MM3 (ref 0–0.2)
BASOPHILS NFR BLD AUTO: 0.2 % (ref 0–2)
BUN BLD-MCNC: 16 MG/DL (ref 7–21)
BUN/CREAT SERPL: 23.2 (ref 7–25)
CALCIUM SPEC-SCNC: 9.1 MG/DL (ref 8.4–10.2)
CHLORIDE SERPL-SCNC: 108 MMOL/L (ref 95–110)
CO2 SERPL-SCNC: 23 MMOL/L (ref 22–31)
CREAT BLD-MCNC: 0.69 MG/DL (ref 0.7–1.3)
DEPRECATED RDW RBC AUTO: 42.4 FL (ref 35.1–43.9)
EOSINOPHIL # BLD AUTO: 0.23 10*3/MM3 (ref 0–0.7)
EOSINOPHIL NFR BLD AUTO: 4.1 % (ref 0–7)
ERYTHROCYTE [DISTWIDTH] IN BLOOD BY AUTOMATED COUNT: 13 % (ref 11.5–14.5)
GFR SERPL CREATININE-BSD FRML MDRD: 119 ML/MIN/1.73 (ref 56–130)
GLUCOSE BLD-MCNC: 105 MG/DL (ref 60–100)
HCT VFR BLD AUTO: 42.3 % (ref 39–49)
HGB BLD-MCNC: 14.6 G/DL (ref 13.7–17.3)
IMM GRANULOCYTES # BLD: 0.03 10*3/MM3 (ref 0–0.02)
IMM GRANULOCYTES NFR BLD: 0.5 % (ref 0–0.5)
INR PPP: 1.08 (ref 0.8–1.2)
LYMPHOCYTES # BLD AUTO: 1.81 10*3/MM3 (ref 0.6–4.2)
LYMPHOCYTES NFR BLD AUTO: 32.4 % (ref 10–50)
MCH RBC QN AUTO: 30.9 PG (ref 26.5–34)
MCHC RBC AUTO-ENTMCNC: 34.5 G/DL (ref 31.5–36.3)
MCV RBC AUTO: 89.4 FL (ref 80–98)
MONOCYTES # BLD AUTO: 0.47 10*3/MM3 (ref 0–0.9)
MONOCYTES NFR BLD AUTO: 8.4 % (ref 0–12)
NEUTROPHILS # BLD AUTO: 3.04 10*3/MM3 (ref 2–8.6)
NEUTROPHILS NFR BLD AUTO: 54.4 % (ref 37–80)
PLATELET # BLD AUTO: 154 10*3/MM3 (ref 150–450)
PMV BLD AUTO: 9.8 FL (ref 8–12)
POTASSIUM BLD-SCNC: 3.9 MMOL/L (ref 3.5–5.1)
PROTHROMBIN TIME: 13.8 SECONDS (ref 11.1–15.3)
RBC # BLD AUTO: 4.73 10*6/MM3 (ref 4.37–5.74)
SODIUM BLD-SCNC: 138 MMOL/L (ref 137–145)
WBC NRBC COR # BLD: 5.59 10*3/MM3 (ref 3.2–9.8)

## 2018-10-08 PROCEDURE — 0 IOPAMIDOL PER 1 ML: Performed by: INTERNAL MEDICINE

## 2018-10-08 PROCEDURE — 85610 PROTHROMBIN TIME: CPT | Performed by: INTERNAL MEDICINE

## 2018-10-08 PROCEDURE — C1769 GUIDE WIRE: HCPCS | Performed by: INTERNAL MEDICINE

## 2018-10-08 PROCEDURE — C1894 INTRO/SHEATH, NON-LASER: HCPCS | Performed by: INTERNAL MEDICINE

## 2018-10-08 PROCEDURE — 25010000002 MIDAZOLAM PER 1 MG: Performed by: INTERNAL MEDICINE

## 2018-10-08 PROCEDURE — 85025 COMPLETE CBC W/AUTO DIFF WBC: CPT | Performed by: INTERNAL MEDICINE

## 2018-10-08 PROCEDURE — 93458 L HRT ARTERY/VENTRICLE ANGIO: CPT | Performed by: INTERNAL MEDICINE

## 2018-10-08 PROCEDURE — 25010000002 FENTANYL CITRATE (PF) 100 MCG/2ML SOLUTION: Performed by: INTERNAL MEDICINE

## 2018-10-08 PROCEDURE — 80048 BASIC METABOLIC PNL TOTAL CA: CPT | Performed by: INTERNAL MEDICINE

## 2018-10-08 PROCEDURE — C1760 CLOSURE DEV, VASC: HCPCS | Performed by: INTERNAL MEDICINE

## 2018-10-08 RX ORDER — MIDAZOLAM HYDROCHLORIDE 1 MG/ML
INJECTION INTRAMUSCULAR; INTRAVENOUS AS NEEDED
Status: DISCONTINUED | OUTPATIENT
Start: 2018-10-08 | End: 2018-10-08 | Stop reason: HOSPADM

## 2018-10-08 RX ORDER — SODIUM CHLORIDE 0.9 % (FLUSH) 0.9 %
1-10 SYRINGE (ML) INJECTION AS NEEDED
Status: DISCONTINUED | OUTPATIENT
Start: 2018-10-08 | End: 2018-10-08 | Stop reason: HOSPADM

## 2018-10-08 RX ORDER — LIDOCAINE HYDROCHLORIDE 20 MG/ML
INJECTION, SOLUTION INFILTRATION; PERINEURAL AS NEEDED
Status: DISCONTINUED | OUTPATIENT
Start: 2018-10-08 | End: 2018-10-08 | Stop reason: HOSPADM

## 2018-10-08 RX ORDER — SODIUM CHLORIDE 9 MG/ML
75 INJECTION, SOLUTION INTRAVENOUS CONTINUOUS
Status: DISCONTINUED | OUTPATIENT
Start: 2018-10-08 | End: 2018-10-08 | Stop reason: HOSPADM

## 2018-10-08 RX ORDER — FENTANYL CITRATE 50 UG/ML
INJECTION, SOLUTION INTRAMUSCULAR; INTRAVENOUS AS NEEDED
Status: DISCONTINUED | OUTPATIENT
Start: 2018-10-08 | End: 2018-10-08 | Stop reason: HOSPADM

## 2018-10-08 RX ORDER — SODIUM CHLORIDE 0.9 % (FLUSH) 0.9 %
3 SYRINGE (ML) INJECTION EVERY 12 HOURS SCHEDULED
Status: DISCONTINUED | OUTPATIENT
Start: 2018-10-08 | End: 2018-10-08 | Stop reason: HOSPADM

## 2018-10-08 RX ORDER — SODIUM CHLORIDE 9 MG/ML
100 INJECTION, SOLUTION INTRAVENOUS CONTINUOUS
Status: DISCONTINUED | OUTPATIENT
Start: 2018-10-08 | End: 2018-10-08 | Stop reason: HOSPADM

## 2018-10-08 RX ADMIN — SODIUM CHLORIDE 75 ML/HR: 9 INJECTION, SOLUTION INTRAVENOUS at 06:54

## 2018-10-08 NOTE — H&P
Cardiology History and Physical Note        Patient Name: Uriah Willingham  Age/Sex: 58 y.o. male  : 1960  MRN: [id number]    Date of Admission : 10/8/2018    Primary care Physician: Chirag Dumont MD    Reason for Admission:  Chest pain  unstable angina pectoris left heart catheterization  Subjective:       Chief Complaint: Chest pain      History of Present Illness:  Uriah Willingham is a 58 y.o. male     Body mass index is 53.21 kg/m². morbidly obese  male with a body mass index of 54, with a weight of 355 pounds, height of 5 feet 8 inches. History of obstructive sleep apnea,  diabetes, arterial hypertension, hypertensive heart disease. History of deep vein thrombosis with bilateral pulmonary embolism, with symptoms of chest pain, shortness of breath, with left popliteal and anterior tibial vein on the left side deep vein thrombosis, on anticoagulation with  Eliquis, with pulmonary infarct secondary to pulmonary embolism, chronic back pain, gastroesophageal reflux disease, sleep apnea, diabetes, with previous non-Q myocardial infarction in 2016.  Patient subsequently had undergone a nuclear stress test which had revealed a large fixed profusion defect involving the inferior wall with infarct with an ejection fraction of 49%.  Patient was treated medically as the patient was on anticoagulation with Eliquis secondary to the pulmonary embolism.    Patient presents for further evaluation for symptoms of chest pain.  Patient has had symptoms of chest pain on and off.  Patient on further questioning denies any fever or chill patient denies any hemoptysis hematuria bright blood per rectum.  Patient has had symptoms of chest discomfort with exertion relieved with rest.  Neck    Due to the patient previous positive nuclear Cardiolite stress tests with reversible ischemia patient was recommended a coronary angiogram.       Patient 10 point review of system except for what is stated in the history of  present illness is negative.        Past Medical History:  1.   Chest pain with positive nuclear Cardiolite stress tests in December 2016 treated medically  2.   Bilateral pulmonary embolism with the left upper pulmonary infarct on 11/11/2016.  3.   Arterial hypertension.  4.   Hypertensive heart disease.  5.   Chronic anticoagulation with Eliquis.  6.   Deep vein thrombosis of the left popliteal vein and left anterior tibial vein.  7.   Mild mitral, mild tricuspid regurgitation.  8.   Obesity with a body mass index of 53.  9.   Obstructive sleep apnea.  10.  Diabetes.  11.  Hyperlipidemia.  12.  Family history for coronary artery disease.     PAST SURGICAL HISTORY:  Appendectomy.     SOCIAL HISTORY:  Denies any tobacco abuse. Drinks up to 10-12 cans of beer on a daily basis. Works as a , involved in strenuous activity.     FAMILY HISTORY:  Significant for a younger brother who had atherosclerotic coronary artery disease with a fatal myocardial infarction.     CARDIAC RISK FACTORS:  1.   Male.  2.   Arterial hypertension.  3.   Diet-controlled diabetes.  4.   Obesity.        Allergies:  No Known Allergies    Home Medication::  Prior to Admission medications    Medication Sig Start Date End Date Taking? Authorizing Provider   acetaminophen (TYLENOL) 650 MG 8 hr tablet Take 650 mg by mouth Every 8 (Eight) Hours As Needed for Mild Pain .   Yes Maya Mcelroy MD   aspirin 81 MG chewable tablet Chew 81 mg Daily.   Yes Maya Mcelroy MD   celecoxib (CeleBREX) 200 MG capsule Take 1 capsule by mouth Daily. Prn arth pain only 9/4/18  Yes Chirag Dumont MD   gabapentin (NEURONTIN) 600 MG tablet 600 mg 3 (Three) Times a Day. 9/21/18  Yes Maya Mcelroy MD   LISINOPRIL PO Take 10 mg by mouth 2 (Two) Times a Day.   Yes Maya Mcelroy MD   magnesium oxide (MAGOX) 400 (241.3 MG) MG tablet tablet Take 400 mg by mouth 2 (Two) Times a Day. 12/1/16  Yes Maya Mcelroy MD   metFORMIN ER  (GLUCOPHAGE-XR) 500 MG 24 hr tablet Take 1 tablet by mouth Daily With Breakfast. 4/16/18  Yes Chirag Dumont MD   metoprolol succinate XL (TOPROL-XL) 25 MG 24 hr tablet Take 25 mg by mouth 2 (Two) Times a Day. 12/1/16  Yes Maya Mcelroy MD   spironolactone (ALDACTONE) 25 MG tablet Take 25 mg by mouth Daily.   Yes Maya Mcelroy MD   Glucose Blood (FREESTYLE LITE TEST VI)  4/17/18   ProviderMaya MD   glucose blood test strip Qd and prn 4/17/18   Chirag Dumont MD   glucose monitor monitoring kit 1 each Daily. And prn 4/17/18   Cihrag Dumont MD   Lancets (FREESTYLE) lancets Qd and prn 4/17/18   Chirag Dumont MD   gabapentin (NEURONTIN) 100 MG capsule Take 600 mg by mouth 3 (Three) Times a Day.  10/8/18  Emergency, Nurse Epic, RN         Review of Systems   Constitutional: Negative for chills, fever and unexpected weight change.   HENT: Negative for hearing loss and nosebleeds.    Eyes: Negative for visual disturbance.   Respiratory: Positive for shortness of breath. Negative for cough, chest tightness and wheezing.    Cardiovascular: Positive for chest pain. Negative for palpitations and leg swelling.   Gastrointestinal: Negative for abdominal pain, blood in stool, constipation, diarrhea, nausea and vomiting.   Endocrine: Negative for cold intolerance, heat intolerance, polydipsia, polyphagia and polyuria.   Genitourinary: Negative for hematuria.   Musculoskeletal: Negative for joint swelling, myalgias and neck pain.   Skin: Negative for color change, rash and wound.   Neurological: Negative for dizziness, seizures, syncope, light-headedness, numbness and headaches.   Hematological: Does not bruise/bleed easily.         Objective:     Objective:  Temp:  [97.7 °F (36.5 °C)] 97.7 °F (36.5 °C)  Heart Rate:  [69] 69  Resp:  [18] 18  BP: (125)/(74) 125/74      Body mass index is 53.21 kg/m².       Physical Exam   Constitutional: He is oriented to person, place, and time. He appears well-developed  and well-nourished.   HENT:   Head: Normocephalic and atraumatic.   Left Ear: External ear normal.   Nose: Nose normal.   Mouth/Throat: Oropharynx is clear and moist.   Eyes: Pupils are equal, round, and reactive to light. Conjunctivae and EOM are normal. No scleral icterus.   Neck: Normal range of motion. Neck supple. No JVD present. No tracheal deviation present. No thyromegaly present.   Cardiovascular: Normal rate and regular rhythm.    Pulmonary/Chest: Breath sounds normal. No stridor.   Abdominal: Bowel sounds are normal.   Musculoskeletal: Normal range of motion.   Lymphadenopathy:     He has no cervical adenopathy.   Neurological: He is alert and oriented to person, place, and time. He has normal reflexes.   Skin: Skin is warm and dry.   Psychiatric: He has a normal mood and affect. His behavior is normal. Judgment and thought content normal.         Lab Review:       Results from last 7 days  Lab Units 10/08/18  0655   SODIUM mmol/L 138   POTASSIUM mmol/L 3.9   CHLORIDE mmol/L 108   CO2 mmol/L 23.0   BUN mg/dL 16   CREATININE mg/dL 0.69*   CALCIUM mg/dL 9.1   GLUCOSE mg/dL 105*               Results from last 7 days  Lab Units 10/08/18  0655   WBC 10*3/mm3 5.59   HEMOGLOBIN g/dL 14.6   HEMATOCRIT % 42.3   PLATELETS 10*3/mm3 154       Results from last 7 days  Lab Units 10/08/18  0655   INR  1.08                       EKG:   ECG/EMG Results (last 24 hours)     ** No results found for the last 24 hours. **          Imaging:  Imaging Results (last 24 hours)     ** No results found for the last 24 hours. **          I personally viewed and interpreted the patient's EKG/Telemetry data.    Assessment:   1.  Chest pain.  2. Previous non-Q myocardial infarction with positive nuclear Cardiolite stress test for reversible ischemia in 2016  3.  History of pulmonary embolism.  4.  Arterial hypertension.  5.  Obesity.          Plan:   1. Chest pain. nd has positive nuclear Cardiolite stress tests in 2016 which was  treated medically after the patient had sustained a pulmonary embolism with pulmonary infarct.  Patient now presents with recurrent symptoms of chest discomfort suggestive of angina. Patient has been recommended a coronary angiogram. Risk-benefit treatment options for the coronary angiogramwas discussed with the patient and an informed consent was obtained.  Patient Mallampati score was #2 patient ASA classification was #2. Risk  for minimal sedation was also discussed with the patient. Plan was to proceed with the coronary angiogram while right radial approach.  2.  History of pulmonary embolism with pulmonary infarct. Patient had undergone workup for hypercoagulable state.  Patient at the present time is off anticoagulation.  3.  Arterial hypertension.  Patient blood pressure is currently well controlled.    Plan of management were discussed with the patient.      Time: time spent in face-to-face evaluation of greater than 55  minutes and interacting and formulating examining and discussing the plan with the patient with 50% of greater time spent in face-to-face interaction.    Artis Garber MD  10/08/18  8:07 AM      Dictated utilizing Dragon dictation.

## 2018-10-12 ENCOUNTER — APPOINTMENT (OUTPATIENT)
Dept: GENERAL RADIOLOGY | Facility: HOSPITAL | Age: 58
End: 2018-10-12

## 2018-10-12 ENCOUNTER — DOCUMENTATION (OUTPATIENT)
Dept: CARDIAC REHAB | Facility: HOSPITAL | Age: 58
End: 2018-10-12

## 2018-10-12 ENCOUNTER — HOSPITAL ENCOUNTER (EMERGENCY)
Facility: HOSPITAL | Age: 58
Discharge: ANOTHER HOSPITAL | End: 2018-10-13
Attending: EMERGENCY MEDICINE | Admitting: EMERGENCY MEDICINE

## 2018-10-12 DIAGNOSIS — I26.92 ACUTE SADDLE PULMONARY EMBOLISM WITHOUT ACUTE COR PULMONALE (HCC): Primary | ICD-10-CM

## 2018-10-12 LAB
ALBUMIN SERPL-MCNC: 3.9 G/DL (ref 3.4–4.8)
ALBUMIN/GLOB SERPL: 1.2 G/DL (ref 1.1–1.8)
ALP SERPL-CCNC: 63 U/L (ref 38–126)
ALT SERPL W P-5'-P-CCNC: 48 U/L (ref 21–72)
ANION GAP SERPL CALCULATED.3IONS-SCNC: 9 MMOL/L (ref 5–15)
APTT PPP: 25.9 SECONDS (ref 20–40.3)
AST SERPL-CCNC: 37 U/L (ref 17–59)
BASOPHILS # BLD AUTO: 0.01 10*3/MM3 (ref 0–0.2)
BASOPHILS NFR BLD AUTO: 0.1 % (ref 0–2)
BILIRUB SERPL-MCNC: 1.6 MG/DL (ref 0.2–1.3)
BUN BLD-MCNC: 21 MG/DL (ref 7–21)
BUN/CREAT SERPL: 28.4 (ref 7–25)
CALCIUM SPEC-SCNC: 9.4 MG/DL (ref 8.4–10.2)
CHLORIDE SERPL-SCNC: 104 MMOL/L (ref 95–110)
CK MB SERPL-CCNC: 0.64 NG/ML (ref 0–5)
CK SERPL-CCNC: 72 U/L (ref 55–170)
CO2 SERPL-SCNC: 21 MMOL/L (ref 22–31)
CREAT BLD-MCNC: 0.74 MG/DL (ref 0.7–1.3)
D-DIMER, QUANTITATIVE (MAD,POW, STR): >4000 NG/ML (FEU) (ref 0–470)
DEPRECATED RDW RBC AUTO: 40.5 FL (ref 35.1–43.9)
EOSINOPHIL # BLD AUTO: 0.02 10*3/MM3 (ref 0–0.7)
EOSINOPHIL NFR BLD AUTO: 0.2 % (ref 0–7)
ERYTHROCYTE [DISTWIDTH] IN BLOOD BY AUTOMATED COUNT: 12.8 % (ref 11.5–14.5)
GFR SERPL CREATININE-BSD FRML MDRD: 109 ML/MIN/1.73 (ref 56–130)
GLOBULIN UR ELPH-MCNC: 3.2 GM/DL (ref 2.3–3.5)
GLUCOSE BLD-MCNC: 152 MG/DL (ref 60–100)
HCT VFR BLD AUTO: 43.6 % (ref 39–49)
HGB BLD-MCNC: 15.8 G/DL (ref 13.7–17.3)
IMM GRANULOCYTES # BLD: 0.02 10*3/MM3 (ref 0–0.02)
IMM GRANULOCYTES NFR BLD: 0.2 % (ref 0–0.5)
INR PPP: 1 (ref 0.8–1.2)
LYMPHOCYTES # BLD AUTO: 1.24 10*3/MM3 (ref 0.6–4.2)
LYMPHOCYTES NFR BLD AUTO: 13.9 % (ref 10–50)
MCH RBC QN AUTO: 31.6 PG (ref 26.5–34)
MCHC RBC AUTO-ENTMCNC: 36.2 G/DL (ref 31.5–36.3)
MCV RBC AUTO: 87.2 FL (ref 80–98)
MONOCYTES # BLD AUTO: 0.52 10*3/MM3 (ref 0–0.9)
MONOCYTES NFR BLD AUTO: 5.8 % (ref 0–12)
NEUTROPHILS # BLD AUTO: 7.13 10*3/MM3 (ref 2–8.6)
NEUTROPHILS NFR BLD AUTO: 79.8 % (ref 37–80)
PLATELET # BLD AUTO: 163 10*3/MM3 (ref 150–450)
PMV BLD AUTO: 10.2 FL (ref 8–12)
POTASSIUM BLD-SCNC: 4.4 MMOL/L (ref 3.5–5.1)
PROT SERPL-MCNC: 7.1 G/DL (ref 6.3–8.6)
PROTHROMBIN TIME: 13 SECONDS (ref 11.1–15.3)
RBC # BLD AUTO: 5 10*6/MM3 (ref 4.37–5.74)
SODIUM BLD-SCNC: 134 MMOL/L (ref 137–145)
TROPONIN I SERPL-MCNC: 0.05 NG/ML
WBC NRBC COR # BLD: 8.94 10*3/MM3 (ref 3.2–9.8)

## 2018-10-12 PROCEDURE — 93005 ELECTROCARDIOGRAM TRACING: CPT

## 2018-10-12 PROCEDURE — 85379 FIBRIN DEGRADATION QUANT: CPT | Performed by: EMERGENCY MEDICINE

## 2018-10-12 PROCEDURE — 71046 X-RAY EXAM CHEST 2 VIEWS: CPT

## 2018-10-12 PROCEDURE — 82550 ASSAY OF CK (CPK): CPT | Performed by: EMERGENCY MEDICINE

## 2018-10-12 PROCEDURE — 83880 ASSAY OF NATRIURETIC PEPTIDE: CPT | Performed by: EMERGENCY MEDICINE

## 2018-10-12 PROCEDURE — 93005 ELECTROCARDIOGRAM TRACING: CPT | Performed by: EMERGENCY MEDICINE

## 2018-10-12 PROCEDURE — 82553 CREATINE MB FRACTION: CPT | Performed by: EMERGENCY MEDICINE

## 2018-10-12 PROCEDURE — 85610 PROTHROMBIN TIME: CPT | Performed by: EMERGENCY MEDICINE

## 2018-10-12 PROCEDURE — 84484 ASSAY OF TROPONIN QUANT: CPT | Performed by: EMERGENCY MEDICINE

## 2018-10-12 PROCEDURE — 96361 HYDRATE IV INFUSION ADD-ON: CPT

## 2018-10-12 PROCEDURE — 85730 THROMBOPLASTIN TIME PARTIAL: CPT | Performed by: EMERGENCY MEDICINE

## 2018-10-12 PROCEDURE — 80053 COMPREHEN METABOLIC PANEL: CPT | Performed by: EMERGENCY MEDICINE

## 2018-10-12 PROCEDURE — 99285 EMERGENCY DEPT VISIT HI MDM: CPT

## 2018-10-12 PROCEDURE — 85025 COMPLETE CBC W/AUTO DIFF WBC: CPT | Performed by: EMERGENCY MEDICINE

## 2018-10-12 PROCEDURE — 93010 ELECTROCARDIOGRAM REPORT: CPT | Performed by: INTERNAL MEDICINE

## 2018-10-12 RX ORDER — ASPIRIN 81 MG/1
324 TABLET, CHEWABLE ORAL ONCE
Status: COMPLETED | OUTPATIENT
Start: 2018-10-12 | End: 2018-10-12

## 2018-10-12 RX ORDER — SODIUM CHLORIDE 0.9 % (FLUSH) 0.9 %
10 SYRINGE (ML) INJECTION AS NEEDED
Status: DISCONTINUED | OUTPATIENT
Start: 2018-10-12 | End: 2018-10-13 | Stop reason: HOSPADM

## 2018-10-12 RX ORDER — SODIUM CHLORIDE 9 MG/ML
125 INJECTION, SOLUTION INTRAVENOUS CONTINUOUS
Status: DISCONTINUED | OUTPATIENT
Start: 2018-10-12 | End: 2018-10-13 | Stop reason: HOSPADM

## 2018-10-12 RX ADMIN — ASPIRIN 81 MG CHEWABLE TABLET 324 MG: 81 TABLET CHEWABLE at 22:43

## 2018-10-12 RX ADMIN — SODIUM CHLORIDE 125 ML/HR: 9 INJECTION, SOLUTION INTRAVENOUS at 22:50

## 2018-10-13 ENCOUNTER — APPOINTMENT (OUTPATIENT)
Dept: CT IMAGING | Facility: HOSPITAL | Age: 58
End: 2018-10-13

## 2018-10-13 VITALS
BODY MASS INDEX: 49.44 KG/M2 | SYSTOLIC BLOOD PRESSURE: 128 MMHG | DIASTOLIC BLOOD PRESSURE: 69 MMHG | HEART RATE: 85 BPM | WEIGHT: 315 LBS | RESPIRATION RATE: 26 BRPM | OXYGEN SATURATION: 96 % | TEMPERATURE: 98 F | HEIGHT: 67 IN

## 2018-10-13 PROBLEM — I26.92 ACUTE SADDLE PULMONARY EMBOLISM WITHOUT ACUTE COR PULMONALE (HCC): Status: ACTIVE | Noted: 2018-10-13

## 2018-10-13 LAB — NT-PROBNP SERPL-MCNC: 1560 PG/ML (ref 0–900)

## 2018-10-13 PROCEDURE — 71275 CT ANGIOGRAPHY CHEST: CPT

## 2018-10-13 PROCEDURE — 0 IOPAMIDOL PER 1 ML: Performed by: EMERGENCY MEDICINE

## 2018-10-13 PROCEDURE — 96365 THER/PROPH/DIAG IV INF INIT: CPT

## 2018-10-13 PROCEDURE — 96361 HYDRATE IV INFUSION ADD-ON: CPT

## 2018-10-13 PROCEDURE — 25010000002 HEPARIN (PORCINE) PER 1000 UNITS: Performed by: EMERGENCY MEDICINE

## 2018-10-13 PROCEDURE — 96366 THER/PROPH/DIAG IV INF ADDON: CPT

## 2018-10-13 RX ORDER — HEPARIN SODIUM 5000 [USP'U]/ML
10000 INJECTION, SOLUTION INTRAVENOUS; SUBCUTANEOUS ONCE
Status: COMPLETED | OUTPATIENT
Start: 2018-10-13 | End: 2018-10-13

## 2018-10-13 RX ORDER — HEPARIN SODIUM 10000 [USP'U]/100ML
9.8 INJECTION, SOLUTION INTRAVENOUS
Status: DISCONTINUED | OUTPATIENT
Start: 2018-10-13 | End: 2018-10-13 | Stop reason: HOSPADM

## 2018-10-13 RX ADMIN — HEPARIN SODIUM 10000 UNITS: 5000 INJECTION, SOLUTION INTRAVENOUS; SUBCUTANEOUS at 01:28

## 2018-10-13 RX ADMIN — IOPAMIDOL 79 ML: 755 INJECTION, SOLUTION INTRAVENOUS at 00:45

## 2018-10-13 RX ADMIN — HEPARIN SODIUM 9.8 UNITS/KG/HR: 10000 INJECTION, SOLUTION INTRAVENOUS at 01:28

## 2018-10-13 NOTE — ED NOTES
Spoke with EMS, she states [facility] and [facility] both declined both due to weather.       Margaret Vera, RN  10/13/18 0158

## 2018-10-13 NOTE — ED PROVIDER NOTES
"Subjective   59yo male pmh significant htn/hyperlipidemia/dm2/pe/rip/cad presents ED c/o onset left sided chest \"pressure\" 1730hrs assoc soa.  Pt reports increasing soa/bloom x 1 month duration. Denies chest pain at time of exam.        Chest Pain   Pain location:  L chest  Pain quality: pressure    Pain radiates to:  Does not radiate  Pain severity:  Moderate  Onset quality:  Sudden  Duration:  1 day  Associated symptoms: shortness of breath    Associated symptoms: no cough        Review of Systems   Constitutional: Negative.    HENT: Negative.    Respiratory: Positive for shortness of breath. Negative for cough.    Cardiovascular: Positive for chest pain.   Gastrointestinal: Negative.    Genitourinary: Negative.    Musculoskeletal: Negative.    Skin: Negative.    Neurological: Positive for light-headedness.   All other systems reviewed and are negative.      Past Medical History:   Diagnosis Date   • Ankle swelling    • Arthritis    • Back pain    • Diabetes mellitus (CMS/HCC)    • GERD (gastroesophageal reflux disease)    • Heartburn    • History of blood clots    • Hypertension    • Joint pain    • Knee pain    • Muscle pain    • Obesity    • Sleep apnea     c pap       No Known Allergies    Past Surgical History:   Procedure Laterality Date   • APPENDECTOMY      lap   • CARDIAC CATHETERIZATION N/A 10/8/2018    Procedure: Left Heart Cath with Right Radial Approach;  Surgeon: Artis Garber MD;  Location: [facility] CATH INVASIVE LOCATION;  Service: Cardiology   • COLONOSCOPY  07/22/2014    Diverticulosis in sigmoid colon. Normal cecum & rectum.   • INJECTION OF MEDICATION  11/28/2016    Kenalog (2)      • INJECTION OF MEDICATION  07/26/2013    Rocephin (1)      • INJECTION OF MEDICATION  11/28/2016    Toradol (2)      • TONSILLECTOMY     • WISDOM TOOTH EXTRACTION         Family History   Problem Relation Age of Onset   • COPD Mother    • Heart disease Father    • Cancer Father        Social History     Social " History   • Marital status:      Social History Main Topics   • Smoking status: Never Smoker   • Smokeless tobacco: Never Used   • Alcohol use 0.6 oz/week     1 Cans of beer per week      Comment: socially   • Drug use: No   • Sexual activity: Defer     Other Topics Concern   • Not on file           Objective   Physical Exam   Constitutional: He is oriented to person, place, and time. He appears well-developed and well-nourished.   HENT:   Head: Normocephalic and atraumatic.   Mouth/Throat: Oropharynx is clear and moist.   Eyes: Pupils are equal, round, and reactive to light.   Neck: Neck supple. No JVD present. No tracheal deviation present.   Cardiovascular: Normal rate, regular rhythm, normal heart sounds and intact distal pulses.  Exam reveals no gallop and no friction rub.    No murmur heard.  Pulmonary/Chest: Effort normal and breath sounds normal. He has no wheezes. He has no rales.   Abdominal: Soft. Bowel sounds are normal. He exhibits no mass. There is no tenderness. There is no rebound and no guarding.   Musculoskeletal: He exhibits no edema.   Lymphadenopathy:     He has no cervical adenopathy.   Neurological: He is alert and oriented to person, place, and time.   Skin: Skin is warm.   Nursing note and vitals reviewed.      ECG 12 Lead    Date/Time: 10/13/2018 1:10 AM  Performed by: MAKI SIGALA  Authorized by: MAKI SIGALA   Interpreted by physician  Rhythm: sinus tachycardia  Ectopy: multifocal PVCs  Rate: tachycardic  BPM: 114  QRS axis: normal  Conduction: conduction normal  ST Segments: ST segments normal  T Waves: T waves normal  Other: no other findings  Clinical impression: abnormal ECG                 ED Course  ED Course as of Oct 13 0159   Sat Oct 13, 2018   0113 PESI score=86 (class III)  [SD]   0132 Dr. Landeros reports Dr. Baca (attending) recommends transfer to higher level of care secondary to no vascular on call at this facility.  [SD]   0157 D/[facility]. Pt stable/critical transport.  [SD]   0159 Air transport unavailable secondary to weather. Pending emergent ALS transport.  [SD]      ED Course User Index  [SD] Cameron Mg MD      Labs Reviewed   COMPREHENSIVE METABOLIC PANEL - Abnormal; Notable for the following:        Result Value    Glucose 152 (*)     Sodium 134 (*)     CO2 21.0 (*)     Total Bilirubin 1.6 (*)     BUN/Creatinine Ratio 28.4 (*)     All other components within normal limits   TROPONIN (IN-HOUSE) - Abnormal; Notable for the following:     Troponin I 0.048 (*)     All other components within normal limits   D-DIMER, QUANTITATIVE - Abnormal; Notable for the following:     D-Dimer, Quantitative >4,000 (*)     All other components within normal limits    Narrative:     Dimer values <500 ng/ml FEU are FDA approved as aid in diagnosis of deep venous thrombosis and pulmonary embolism.  This test should not be used in an exclusion strategy with pretest probability alone.    A recent guideline regarding diagnosis for pulmonary thromboembolism recommends an adjusted exclusion criterion of age x 10 ng/ml FEU for patients >50 years of age ([employer] Intern Med 2015; 163: 701-711).   BNP (IN-HOUSE) - Abnormal; Notable for the following:     proBNP 1,560.0 (*)     All other components within normal limits   PROTIME-INR - Normal    Narrative:     Therapeutic range for most indications is 2.0-3.0 INR,  or 2.5-3.5 for mechanical heart valves.   APTT - Normal    Narrative:     The recommended Heparin therapeutic range is 68-97 seconds.   CK - Normal   CK MB - Normal   CBC WITH AUTO DIFFERENTIAL - Normal   TROPONIN (IN-HOUSE)   PROTIME-INR   APTT   CBC WITH AUTO DIFFERENTIAL   CBC AND DIFFERENTIAL    Narrative:     The following orders were created for panel order CBC & Differential.  Procedure                               Abnormality         Status                     ---------                               -----------         ------                      CBC Auto Differential[[id number]]        Normal              Final result                 Please view results for these tests on the individual orders.   CBC AND DIFFERENTIAL    Narrative:     The following orders were created for panel order CBC & Differential.  Procedure                               Abnormality         Status                     ---------                               -----------         ------                     CBC Auto Differential[[id number]]                                                         Please view results for these tests on the individual orders.     Xr Chest 2 View    Result Date: 10/12/2018  Narrative: Chest 2 view on  10/12/2018 CLINICAL INDICATION: Shortness of breath COMPARISON: 9/1/2017 FINDINGS: The lungs are clear. Cardiac, hilar and mediastinal contours are within normal limits. Pulmonary vascularity is within normal limits. No bony abnormality is noted.     Impression: No active disease. Electronically signed by:  David Vasquez  10/12/2018 10:22 PM CDT Workstation: [id number]    Ct Angiogram Chest With Contrast    Result Date: 10/13/2018  Narrative: CT angiogram chest with contrast on 10/13/2018 CLINICAL INDICATION: Shortness of breath TECHNIQUE: Multiple axial images are obtained throughout the chest following the administration of IV contrast.  Computer generated 3D reconstructions/MIPS were performed. This exam was performed according to our departmental dose-optimization program, which includes automated exposure control, adjustment of the mA and/or kV according to patient size and/or use of iterative reconstruction technique. Total DLP is 826.7 mGy*cm. COMPARISON: 9/1/2017 FINDINGS: There is large saddle embolus filling defect extending across the main pulmonary artery into the left and right pulmonary arteries. There are extensive filling defects within the bilateral upper and lower lobe pulmonary arteries consistent with pulmonary emboli. Embolic burden  is moderate. Dr. Mg in the emergency Department was made aware of this finding by myself by phone on 10/13/2018 at 1:06 AM. There is no pleural or pericardial effusion. Limited visualized upper abdomen is unremarkable. There is no thoracic adenopathy. There is no aortic aneurysm or dissection. Degenerative changes are noted in the spine. Peripheral nodular opacity in the right lower lobe may represent very small area of early pulmonary infarct. Lungs are otherwise clear.     Impression: Saddle pulmonary embolus with bilateral pulmonary emboli with moderate amount of embolic burden bilaterally. Electronically signed by:  David Vasquez  10/13/2018 1:09 AM CDT Workstation: [id number]                MDM      Final diagnoses:   Acute saddle pulmonary embolism without acute cor pulmonale (CMS/HCC)            Cameron Mg MD  10/13/18 0110       Cameron Mg MD  10/13/18 0114       Cameron Mg MD  10/13/18 0159       Cameron Mg MD  10/13/18 0159

## 2018-10-13 NOTE — ED NOTES
Spoke with ems, approx 2 hours for ems transport to [place].      Margaret Vera RN  10/13/18 0136

## 2018-10-13 NOTE — ED NOTES
Been having tightness in chest wouldn't go away. No not having since gotten to ER.  Just had Heart Cath on Monday 10/8/18     Maureen Villegas RN  10/12/18 5726

## 2018-10-13 NOTE — ED NOTES
Report given to Alma at [facility]. Going to room 4810     Maureen Villegas RN  10/13/18 2083

## 2018-10-22 ENCOUNTER — OFFICE VISIT (OUTPATIENT)
Dept: FAMILY MEDICINE CLINIC | Facility: CLINIC | Age: 58
End: 2018-10-22

## 2018-10-22 VITALS
SYSTOLIC BLOOD PRESSURE: 126 MMHG | WEIGHT: 315 LBS | HEIGHT: 67 IN | BODY MASS INDEX: 49.44 KG/M2 | DIASTOLIC BLOOD PRESSURE: 74 MMHG

## 2018-10-22 DIAGNOSIS — I26.92 ACUTE SADDLE PULMONARY EMBOLISM WITHOUT ACUTE COR PULMONALE (HCC): Chronic | ICD-10-CM

## 2018-10-22 DIAGNOSIS — E11.9 TYPE 2 DIABETES MELLITUS WITHOUT COMPLICATION, WITHOUT LONG-TERM CURRENT USE OF INSULIN (HCC): Chronic | ICD-10-CM

## 2018-10-22 DIAGNOSIS — I87.2 VENOUS INSUFFICIENCY: Primary | Chronic | ICD-10-CM

## 2018-10-22 DIAGNOSIS — I26.99 RECURRENT PULMONARY EMBOLI (HCC): ICD-10-CM

## 2018-10-22 PROCEDURE — 99214 OFFICE O/P EST MOD 30 MIN: CPT | Performed by: FAMILY MEDICINE

## 2018-10-22 RX ORDER — HYDROCODONE BITARTRATE AND ACETAMINOPHEN 7.5; 3 MG/1; MG/1
1 TABLET ORAL 2 TIMES DAILY
COMMUNITY

## 2018-10-22 NOTE — PROGRESS NOTES
Subjective   Uriah Willingham is a 58 y.o. male.     History of Present Illness   hospitalization pulmonary saddle embolus.  This is his second embolus he's had.  Now is on chronic anticoagulation.  Been worked up in the past.  Felt related to obesity and chronic leg swelling.  Says he feels well no shortness of breath.  Medicines have been reviewed.  Sugars have been doing well.  Declines flu vaccine.    The following portions of the patient's history were reviewed and updated as appropriate: allergies, current medications, past family history, past medical history, past social history, past surgical history and problem list.    Review of Systems   Constitutional: Negative for activity change, appetite change, fatigue and unexpected weight change.   HENT: Negative for trouble swallowing and voice change.    Eyes: Negative for redness and visual disturbance.   Respiratory: Negative for cough and wheezing.    Cardiovascular: Negative for chest pain and palpitations.   Gastrointestinal: Negative for abdominal pain, constipation, diarrhea, nausea and vomiting.   Genitourinary: Negative for urgency.   Musculoskeletal: Positive for arthralgias and back pain. Negative for joint swelling.   Neurological: Negative for syncope and headaches.   Hematological: Negative for adenopathy.   Psychiatric/Behavioral: Negative for sleep disturbance.       Objective   Physical Exam   HENT:   Head: Normocephalic.   Eyes: Pupils are equal, round, and reactive to light.   Neck: Normal range of motion.   Cardiovascular: Normal rate and regular rhythm.    Pulmonary/Chest: Effort normal.   Abdominal: Soft.   Musculoskeletal: He exhibits no edema.   Psychiatric: He has a normal mood and affect. Thought content normal.       Assessment/Plan   Uriah was seen today for follow-up.    Diagnoses and all orders for this visit:    Venous insufficiency    BMI 50.0-59.9, adult (CMS/[facility])    Acute saddle pulmonary embolism without acute cor pulmonale  (CMS/[facility])    Recurrent pulmonary emboli (CMS/[facility])  -     apixaban (ELIQUIS) 5 MG tablet tablet; Take 1 tablet by mouth Every 12 (Twelve) Hours.    Type 2 diabetes mellitus without complication, without long-term current use of insulin (CMS/[facility])      Continue seeing all subspecialists  lifestyle measures recheck 4 months betime for hemoglobin A1c. Current outpatient and discharge medications have been reconciled for the patient.  Reviewed by: Chirag Dumont MD

## 2018-11-05 ENCOUNTER — NUTRITION (OUTPATIENT)
Dept: BARIATRICS/WEIGHT MGMT | Facility: HOSPITAL | Age: 58
End: 2018-11-05

## 2018-11-05 PROCEDURE — 97803 MED NUTRITION INDIV SUBSEQ: CPT

## 2018-11-05 NOTE — PROGRESS NOTES
"Nutrition Bariatric/[facility] Note     Visit   [facility]    Anthropometrics   Height: 67\"  Weight: 326.5#  BMI: 51  Lost: 7#    Nutrition Recall  24 Hour recall:  (B) Premier protein drink (L) summer sausage & cheese or jerky or yogurt, water (D) meat, vegetable, water  Eating 3 meals daily   Snacking: olives, \"cheese chips\"  Making healthier choices  Limited sweet intake  Monitoring portions  Drinking with meals   Drinking greater to or equal to 64 fluid ounces  Replacing 1 meal with protein shake daily     Exercise   Walking 2-3 times per week    Habits:  None    Education    [facility] 4 meal cycle diet plan    Nutrition Goals   Continue diet changes  Eat 4 meals per day with protein using diet plan  Eat protein first at meals  Eliminate snacks  Replace snacks with fruit  Healthier food choices  Portion control / Use smaller plate or measuring cup     Exercise Goals  Add 15-30 minutes of walking, cycling, elliptical, swimming, chair, yoga or crossfit daily    Sharmila Randolph RD, LD  11/05/2018  9:57 AM    "

## 2018-11-06 ENCOUNTER — OFFICE VISIT (OUTPATIENT)
Dept: PODIATRY | Facility: CLINIC | Age: 58
End: 2018-11-06

## 2018-11-06 VITALS
HEART RATE: 73 BPM | SYSTOLIC BLOOD PRESSURE: 135 MMHG | DIASTOLIC BLOOD PRESSURE: 81 MMHG | HEIGHT: 67 IN | BODY MASS INDEX: 49.44 KG/M2 | WEIGHT: 315 LBS

## 2018-11-06 DIAGNOSIS — L85.1 ACQUIRED KERATODERMA: ICD-10-CM

## 2018-11-06 DIAGNOSIS — E11.8 DIABETIC FOOT (HCC): ICD-10-CM

## 2018-11-06 DIAGNOSIS — L85.3 XEROSIS CUTIS: ICD-10-CM

## 2018-11-06 DIAGNOSIS — M79.672 FOOT PAIN, LEFT: Primary | ICD-10-CM

## 2018-11-06 PROCEDURE — 99203 OFFICE O/P NEW LOW 30 MIN: CPT | Performed by: PODIATRIST

## 2018-11-06 NOTE — PROGRESS NOTES
Uriah Willingham  1960  58 y.o. male    11/06/2018  Chief Complaint   Patient presents with   • Left Foot - Pain   new patient with left heel pain, patient had xrays done today, patient states that when something touches it.        History of Present Illness    Uriah Willingham is a 58 y.o. male who presents for evaluation of left foot pain.  He states the pain is primarily in the bottom of his left heel.  He feels it is related to thickened, callused skin to the area which occasionally fissures.  He states pain is been present for several months.  He states he gets better and worse at times.  He has scrubbed some of that scan with a ped egg in the past with some improvement.  He denies any trauma or injuries.  He states the pain is achy and at times sharp.  The pain is relieved with rest.      Past Medical History:   Diagnosis Date   • Ankle swelling    • Arthritis    • Back pain    • Diabetes mellitus (CMS/HCC)    • GERD (gastroesophageal reflux disease)    • Heartburn    • History of blood clots    • Hypertension    • Joint pain    • Knee pain    • Muscle pain    • Obesity    • Sleep apnea     c pap         Past Surgical History:   Procedure Laterality Date   • APPENDECTOMY      lap   • CARDIAC CATHETERIZATION N/A 10/8/2018    Procedure: Left Heart Cath with Right Radial Approach;  Surgeon: Artis Garber MD;  Location: [facility] CATH INVASIVE LOCATION;  Service: Cardiology   • COLONOSCOPY  07/22/2014    Diverticulosis in sigmoid colon. Normal cecum & rectum.   • INJECTION OF MEDICATION  11/28/2016    Kenalog (2)      • INJECTION OF MEDICATION  07/26/2013    Rocephin (1)      • INJECTION OF MEDICATION  11/28/2016    Toradol (2)      • TONSILLECTOMY     • WISDOM TOOTH EXTRACTION           Family History   Problem Relation Age of Onset   • COPD Mother    • Heart disease Father    • Cancer Father          Social History     Social History   • Marital status:      Spouse name: N/A   • Number of  "children: N/A   • Years of education: N/A     Occupational History   • Not on file.     Social History Main Topics   • Smoking status: Never Smoker   • Smokeless tobacco: Never Used   • Alcohol use 0.6 oz/week     1 Cans of beer per week      Comment: socially   • Drug use: No   • Sexual activity: Defer     Other Topics Concern   • Not on file     Social History Narrative   • No narrative on file         Current Outpatient Prescriptions   Medication Sig Dispense Refill   • acetaminophen (TYLENOL) 650 MG 8 hr tablet Take 650 mg by mouth Every 8 (Eight) Hours As Needed for Mild Pain .     • apixaban (ELIQUIS) 5 MG tablet tablet Take 1 tablet by mouth Every 12 (Twelve) Hours. 180 tablet 3   • Glucose Blood (FREESTYLE LITE TEST VI)   3   • glucose blood test strip Qd and prn 100 each 4   • glucose monitor monitoring kit 1 each Daily. And prn 1 each 5   • HYDROcodone-acetaminophen (NORCO) 5-325 MG per tablet Take 1 tablet by mouth 2 (Two) Times a Day.     • Lancets (FREESTYLE) lancets Qd and prn 100 each 4   • LISINOPRIL PO Take 10 mg by mouth 2 (Two) Times a Day.     • magnesium oxide (MAGOX) 400 (241.3 MG) MG tablet tablet Take 400 mg by mouth 2 (Two) Times a Day.     • metFORMIN (GLUCOPHAGE) 500 MG tablet Take 500 mg by mouth Daily With Breakfast.     • metoprolol succinate XL (TOPROL-XL) 25 MG 24 hr tablet Take 25 mg by mouth 2 (Two) Times a Day.     • spironolactone (ALDACTONE) 25 MG tablet Take 25 mg by mouth Daily.       No current facility-administered medications for this visit.          OBJECTIVE    /81 (BP Location: Right arm)   Pulse 73   Ht 170.2 cm (67\")   Wt (!) 153 kg (338 lb)   BMI 52.94 kg/m²       Review of Systems   Constitutional: Negative.    HENT: Negative.    Respiratory: Negative.    Cardiovascular: Negative.    Musculoskeletal: Positive for arthralgias and back pain.   Skin: Positive for color change.   Neurological: Negative.    Psychiatric/Behavioral: Negative.          Physical Exam "   Constitutional: he appears well-developed and well-nourished.   HEENT: Normocephalic. Atraumatic.  CV: No CP. RRR  Resp: Non-labored respirations.  Psychiatric: he has a normal mood and affect. his behavior is normal.         Lower Extremity Exam:  Vascular: DP/PT pulses palpable 2+.   No edema  Foot warm  Neuro: Protective sensation intact, b/l.  DTRs intact  Negative Tinel over tarsal tunnel  Integument: No open wounds or lesions.  Diffuse xerosis to bilateral plantar foot.  Intractable plantar keratosis ×2 to left plantar heel.  Positive tenderness to palpation.  No erythema  No masses  Musculoskeletal: LE muscle strength 5/5.   Gait normal  Ankle ROM decreased without pain or crepitus  STJ ROM full without pain or crepitus  No tenderness to palpation of medial or central plantar fascial band  No tenderness with maximal dorsiflexion.              ASSESSMENT AND PLAN    Uriah was seen today for pain.    Diagnoses and all orders for this visit:    Foot pain, left  -     XR Foot 3+ View Left    Acquired keratoderma    Xerosis cutis    Diabetic foot (CMS/HCC)      -Comprehensive foot and ankle exam performed  -Radiographs ordered and reviewed  -Educated pt on diagnosis, etiology and treatment of IPKs, xerosis  -Discussed proper motion control shoe gear.  Recommend gel shock absorbing insoles for decreased skin friction.  -Above noted HPK debrided with 15 blade to epidermis without incident  -Rx [facility] pharmacy urea cream for additional callus management.  -Recheck as needed            This document has been electronically signed by Seb Gupta DPM on November 6, 2018 12:44 PM     EMR Dragon/Transcription disclaimer:   Much of this encounter note is an electronic transcription/translation of spoken language to printed text. The electronic translation of spoken language may permit erroneous, or at times, nonsensical words or phrases to be inadvertently transcribed; Although I have reviewed the note for  such errors, some may still exist.    Seb Gupta DPM  11/6/2018  12:44 PM

## 2018-11-06 NOTE — PROGRESS NOTES
Subjective   Uriah Willingham is a 58 y.o. male.     History of Present Illness    new patient with left heel pain, patient had xrays done today, patient states that when something touches it.    {Common H&P Review Areas:92005}    Review of Systems    Objective   Physical Exam      Assessment/Plan   {Assess/PlanSmartLinks:19059}

## 2019-01-10 ENCOUNTER — OFFICE VISIT (OUTPATIENT)
Dept: ORTHOPEDIC SURGERY | Facility: CLINIC | Age: 59
End: 2019-01-10

## 2019-01-10 VITALS — BODY MASS INDEX: 49.44 KG/M2 | WEIGHT: 315 LBS | HEIGHT: 67 IN

## 2019-01-10 DIAGNOSIS — M17.0 PRIMARY OSTEOARTHRITIS OF BOTH KNEES: ICD-10-CM

## 2019-01-10 DIAGNOSIS — G89.29 CHRONIC PAIN OF BOTH KNEES: Primary | Chronic | ICD-10-CM

## 2019-01-10 DIAGNOSIS — M25.561 CHRONIC PAIN OF BOTH KNEES: Primary | Chronic | ICD-10-CM

## 2019-01-10 DIAGNOSIS — M25.562 CHRONIC PAIN OF BOTH KNEES: Primary | Chronic | ICD-10-CM

## 2019-01-10 PROCEDURE — 20610 DRAIN/INJ JOINT/BURSA W/O US: CPT | Performed by: ORTHOPAEDIC SURGERY

## 2019-01-10 PROCEDURE — 99213 OFFICE O/P EST LOW 20 MIN: CPT | Performed by: ORTHOPAEDIC SURGERY

## 2019-01-10 NOTE — PROGRESS NOTES
Uriah Willingham is a 58 y.o. male returns for     Chief Complaint   Patient presents with   • Left Knee - Pain   • Right Knee - Pain       HISTORY OF PRESENT ILLNESS: Patient is here today for bilateral knee pain. Patient states that his pain is 7/10 and his right knee hurts worse than his left.  He went to go to see if have his back done but then had another pulmonary embolus.  He is on blood thinner again.  He is too high risk for back surgery.  His big problem is his knees as well as done well without I am steroid injection and Toradol but he can have the Toradol because of his blood thinner.  The I am injection doesn't help him for the right long he's never had a intra-articular injection       CONCURRENT MEDICAL HISTORY:    Past Medical History:   Diagnosis Date   • Ankle swelling    • Arthritis    • Back pain    • Diabetes mellitus (CMS/HCC)    • GERD (gastroesophageal reflux disease)    • Heartburn    • History of blood clots    • Hypertension    • Joint pain    • Knee pain    • Muscle pain    • Obesity    • Sleep apnea     c pap       No Known Allergies      Current Outpatient Medications:   •  acetaminophen (TYLENOL) 650 MG 8 hr tablet, Take 650 mg by mouth Every 8 (Eight) Hours As Needed for Mild Pain ., Disp: , Rfl:   •  apixaban (ELIQUIS) 5 MG tablet tablet, Take 1 tablet by mouth Every 12 (Twelve) Hours., Disp: 180 tablet, Rfl: 3  •  Glucose Blood (FREESTYLE LITE TEST VI), , Disp: , Rfl: 3  •  glucose blood test strip, Qd and prn, Disp: 100 each, Rfl: 4  •  glucose monitor monitoring kit, 1 each Daily. And prn, Disp: 1 each, Rfl: 5  •  HYDROcodone-acetaminophen (NORCO) 5-325 MG per tablet, Take 1 tablet by mouth 2 (Two) Times a Day., Disp: , Rfl:   •  Lancets (FREESTYLE) lancets, Qd and prn, Disp: 100 each, Rfl: 4  •  LISINOPRIL PO, Take 10 mg by mouth 2 (Two) Times a Day., Disp: , Rfl:   •  magnesium oxide (MAGOX) 400 (241.3 MG) MG tablet tablet, Take 400 mg by mouth 2 (Two) Times a Day., Disp: ,  "Rfl:   •  metFORMIN (GLUCOPHAGE) 500 MG tablet, Take 500 mg by mouth Daily With Breakfast., Disp: , Rfl:   •  metoprolol succinate XL (TOPROL-XL) 25 MG 24 hr tablet, Take 25 mg by mouth 2 (Two) Times a Day., Disp: , Rfl:   •  spironolactone (ALDACTONE) 25 MG tablet, Take 25 mg by mouth Daily., Disp: , Rfl:     Past Surgical History:   Procedure Laterality Date   • APPENDECTOMY      lap   • CARDIAC CATHETERIZATION N/A 10/8/2018    Procedure: Left Heart Cath with Right Radial Approach;  Surgeon: Artis Garber MD;  Location: [facility] CATH INVASIVE LOCATION;  Service: Cardiology   • COLONOSCOPY  07/22/2014    Diverticulosis in sigmoid colon. Normal cecum & rectum.   • INJECTION OF MEDICATION  11/28/2016    Kenalog (2)      • INJECTION OF MEDICATION  07/26/2013    Rocephin (1)      • INJECTION OF MEDICATION  11/28/2016    Toradol (2)      • TONSILLECTOMY     • WISDOM TOOTH EXTRACTION             Review of Systems   Constitutional: Positive for activity change. Negative for chills and fever.   HENT: Negative for facial swelling.    Eyes: Negative for photophobia.   Respiratory: Negative for apnea and shortness of breath.    Cardiovascular: Negative for chest pain and leg swelling.   Gastrointestinal: Negative for abdominal pain, nausea and vomiting.   Genitourinary: Negative for dysuria.   Musculoskeletal: Positive for arthralgias, gait problem and joint swelling.   Skin: Negative for color change and rash.   Neurological: Negative for seizures and syncope.   Psychiatric/Behavioral: Negative for behavioral problems and dysphoric mood.       PHYSICAL EXAMINATION:       Ht 170.2 cm (67\")   Wt (!) 144 kg (317 lb)   BMI 49.65 kg/m²     Physical Exam   Constitutional: He is oriented to person, place, and time. He appears well-developed and well-nourished.   HENT:   Head: Normocephalic and atraumatic.   Eyes: EOM are normal. Pupils are equal, round, and reactive to light.   Neck: Neck supple. No tracheal deviation present. "   Pulmonary/Chest: Effort normal.   Musculoskeletal: Normal range of motion. He exhibits tenderness. He exhibits no edema or deformity.   Neurological: He is alert and oriented to person, place, and time.   Skin: Skin is warm and dry. No erythema.   Venous stasis changes   Psychiatric: He has a normal mood and affect.       GAIT:     []  Normal  [x]  Antalgic    Assistive device: []  None  []  Walker     []  Crutches  []  Cane     []  Wheelchair  []  Stretcher    Ortho Exam  Venous stasis changes lower extremity.  Full extension limited flexion tender medial joint line grossly neurologically intact.    No results found.        Large Joint Arthrocentesis: R knee  Date/Time: 1/10/2019 11:59 AM  Consent given by: patient  Timeout: Immediately prior to procedure a time out was called to verify the correct patient, procedure, equipment, support staff and site/side marked as required   Supporting Documentation  Indications: pain   Procedure Details  Location: knee - R knee  Preparation: Patient was prepped and draped in the usual sterile fashion  Needle size: 22 G  Approach: anteromedial  Medications administered: 4 mL lidocaine 1 %, 2 mL triamcinolone acetonide 40 MG/ML  Patient tolerance: patient tolerated the procedure well with no immediate complications          ASSESSMENT:    Diagnoses and all orders for this visit:    Chronic pain of both knees  -     Large Joint Arthrocentesis: R knee    Primary osteoarthritis of both knees          PLAN we'll inject the right most symptomatic knee with mixture of Xylocaine and Kenalog as above.  Ice tonight.  If not significantly better with the opposite side will consider injecting it in a couple weeks.    No Follow-up on file.    Aime Cedillo MD

## 2019-01-23 ENCOUNTER — OFFICE VISIT (OUTPATIENT)
Dept: ORTHOPEDIC SURGERY | Facility: CLINIC | Age: 59
End: 2019-01-23

## 2019-01-23 VITALS — HEIGHT: 67 IN | BODY MASS INDEX: 49.44 KG/M2 | WEIGHT: 315 LBS

## 2019-01-23 DIAGNOSIS — M25.562 CHRONIC PAIN OF BOTH KNEES: Primary | ICD-10-CM

## 2019-01-23 DIAGNOSIS — M25.561 CHRONIC PAIN OF BOTH KNEES: Primary | ICD-10-CM

## 2019-01-23 DIAGNOSIS — G89.29 CHRONIC PAIN OF BOTH KNEES: Primary | ICD-10-CM

## 2019-01-23 DIAGNOSIS — M17.0 PRIMARY OSTEOARTHRITIS OF BOTH KNEES: ICD-10-CM

## 2019-01-23 PROCEDURE — 20610 DRAIN/INJ JOINT/BURSA W/O US: CPT | Performed by: ORTHOPAEDIC SURGERY

## 2019-01-23 PROCEDURE — 99213 OFFICE O/P EST LOW 20 MIN: CPT | Performed by: ORTHOPAEDIC SURGERY

## 2019-01-23 RX ORDER — TRIAMCINOLONE ACETONIDE 40 MG/ML
80 INJECTION, SUSPENSION INTRA-ARTICULAR; INTRAMUSCULAR
Status: COMPLETED | OUTPATIENT
Start: 2019-01-23 | End: 2019-01-23

## 2019-01-23 RX ORDER — LIDOCAINE HYDROCHLORIDE 20 MG/ML
2 INJECTION, SOLUTION INFILTRATION; PERINEURAL
Status: COMPLETED | OUTPATIENT
Start: 2019-01-23 | End: 2019-01-23

## 2019-01-23 RX ADMIN — LIDOCAINE HYDROCHLORIDE 2 ML: 20 INJECTION, SOLUTION INFILTRATION; PERINEURAL at 11:04

## 2019-01-23 RX ADMIN — TRIAMCINOLONE ACETONIDE 80 MG: 40 INJECTION, SUSPENSION INTRA-ARTICULAR; INTRAMUSCULAR at 11:04

## 2019-01-23 NOTE — PROGRESS NOTES
Uriah Willingham is a 58 y.o. male returns for     Chief Complaint   Patient presents with   • Right Knee - Follow-up   • Left Knee - Follow-up       HISTORY OF PRESENT ILLNESS:f/u on bilateral knee pain, patient needs injection in the left knee he had injection in the right knee on 1/10/19, he has had an injection the left knee.  The right knee is doing much better.  Less pain or discomfort.     CONCURRENT MEDICAL HISTORY:    Past Medical History:   Diagnosis Date   • Ankle swelling    • Arthritis    • Back pain    • Diabetes mellitus (CMS/HCC)    • GERD (gastroesophageal reflux disease)    • Heartburn    • History of blood clots    • Hypertension    • Joint pain    • Knee pain    • Muscle pain    • Obesity    • Sleep apnea     c pap       No Known Allergies      Current Outpatient Medications:   •  acetaminophen (TYLENOL) 650 MG 8 hr tablet, Take 650 mg by mouth Every 8 (Eight) Hours As Needed for Mild Pain ., Disp: , Rfl:   •  apixaban (ELIQUIS) 5 MG tablet tablet, Take 1 tablet by mouth Every 12 (Twelve) Hours., Disp: 180 tablet, Rfl: 3  •  Glucose Blood (FREESTYLE LITE TEST VI), , Disp: , Rfl: 3  •  glucose blood test strip, Qd and prn, Disp: 100 each, Rfl: 4  •  glucose monitor monitoring kit, 1 each Daily. And prn, Disp: 1 each, Rfl: 5  •  HYDROcodone-acetaminophen (NORCO) 5-325 MG per tablet, Take 1 tablet by mouth 2 (Two) Times a Day., Disp: , Rfl:   •  Lancets (FREESTYLE) lancets, Qd and prn, Disp: 100 each, Rfl: 4  •  LISINOPRIL PO, Take 10 mg by mouth 2 (Two) Times a Day., Disp: , Rfl:   •  magnesium oxide (MAGOX) 400 (241.3 MG) MG tablet tablet, Take 400 mg by mouth 2 (Two) Times a Day., Disp: , Rfl:   •  metFORMIN (GLUCOPHAGE) 500 MG tablet, Take 500 mg by mouth Daily With Breakfast., Disp: , Rfl:   •  metoprolol succinate XL (TOPROL-XL) 25 MG 24 hr tablet, Take 25 mg by mouth 2 (Two) Times a Day., Disp: , Rfl:   •  spironolactone (ALDACTONE) 25 MG tablet, Take 25 mg by mouth Daily., Disp: , Rfl:  "    Past Surgical History:   Procedure Laterality Date   • APPENDECTOMY      lap   • CARDIAC CATHETERIZATION N/A 10/8/2018    Procedure: Left Heart Cath with Right Radial Approach;  Surgeon: Artis Garber MD;  Location: [facility] CATH INVASIVE LOCATION;  Service: Cardiology   • COLONOSCOPY  07/22/2014    Diverticulosis in sigmoid colon. Normal cecum & rectum.   • INJECTION OF MEDICATION  11/28/2016    Kenalog (2)      • INJECTION OF MEDICATION  07/26/2013    Rocephin (1)      • INJECTION OF MEDICATION  11/28/2016    Toradol (2)      • TONSILLECTOMY     • WISDOM TOOTH EXTRACTION             ROS: Review of systems has been updated as of today's date.  All other systems are negative except as noted previously.    PHYSICAL EXAMINATION:       Ht 170.2 cm (67\")   Wt (!) 144 kg (317 lb)   BMI 49.65 kg/m²     Physical Exam   Constitutional: He is oriented to person, place, and time. He appears well-developed.   HENT:   Head: Normocephalic and atraumatic.   Eyes: EOM are normal. Pupils are equal, round, and reactive to light.   Neck: Neck supple. No tracheal deviation present.   Pulmonary/Chest: Effort normal.   Musculoskeletal: He exhibits edema and tenderness. He exhibits no deformity.   Neurological: He is alert and oriented to person, place, and time.   Skin: Skin is warm and dry. No erythema.   Psychiatric: He has a normal mood and affect.   Vitals reviewed.      GAIT:     []  Normal  []  Antalgic    Assistive device: []  None  []  Walker     []  Crutches  []  Cane     []  Wheelchair  []  Stretcher    Ortho Exam  Stasis changes noted as before.  Tender medial joint line.  Motion from well-maintained.    No results found.          ASSESSMENT:    Diagnoses and all orders for this visit:    Chronic pain of both knees  -     Large Joint Arthrocentesis: L knee    Primary osteoarthritis of both knees  -     Large Joint Arthrocentesis: L knee          PLAN we will inject the left knee at this point with measures Xylocaine " and Kenalog.  He'll ice it down we'll follow-up as needed  Large Joint Arthrocentesis: L knee  Date/Time: 1/23/2019 11:04 AM  Consent given by: patient  Supporting Documentation  Indications: pain   Procedure Details  Location: knee - L knee  Needle size: 22 G  Medications administered: 2 mL lidocaine 2%; 80 mg triamcinolone acetonide 40 MG/ML              No Follow-up on file.    Aime Cedillo MD

## 2019-01-30 ENCOUNTER — TELEPHONE (OUTPATIENT)
Dept: BARIATRICS/WEIGHT MGMT | Facility: CLINIC | Age: 59
End: 2019-01-30

## 2019-01-30 NOTE — TELEPHONE ENCOUNTER
Called to see if patient wants to reschedule appointment that was cancelled in December 2018. He said that he wants to hold off on rescheduling because he is doing well on his own.

## 2019-01-31 ENCOUNTER — OFFICE VISIT (OUTPATIENT)
Dept: SLEEP MEDICINE | Facility: HOSPITAL | Age: 59
End: 2019-01-31

## 2019-01-31 VITALS
OXYGEN SATURATION: 96 % | HEART RATE: 62 BPM | SYSTOLIC BLOOD PRESSURE: 142 MMHG | HEIGHT: 67 IN | DIASTOLIC BLOOD PRESSURE: 74 MMHG | BODY MASS INDEX: 48.53 KG/M2 | WEIGHT: 309.2 LBS

## 2019-01-31 DIAGNOSIS — G47.33 OSA (OBSTRUCTIVE SLEEP APNEA): Primary | ICD-10-CM

## 2019-01-31 PROCEDURE — 99213 OFFICE O/P EST LOW 20 MIN: CPT | Performed by: INTERNAL MEDICINE

## 2019-01-31 NOTE — PROGRESS NOTES
Sleep Clinic Follow Up    Date: 2019  Primary Care Physician: Chirag Dumont MD    Last office visit: 2018 (I reviewed this note)    CC: Follow up: [facility]    Sleep Testin. PSG on 2007 - AHI of 21  2. Currently on 15-20 cm H2O      Assessment and Plan:    1. Obstructive sleep apnea Established, stable (1)  1. Compliant and improved with PAP therapy  2. Continue PAP as prescribed.   3. Script for PAP supplies  4. Change ramp to 12cm H2O for 10 min    Interim History (1-3/4):  Since the last visit:    1) moderate [facility] -  Uriah Willingham has remained compliant with CPAP. He denies mask and machine issues, dry mouth, headaches, or pressures intolerance. He denies abnormal dreams, sleep paralysis, nasal congestion, URI sx.    PAP Data:    Time frame: 2018 - 2019   Compliance 97.3 %  Average use on days used: 7hrs 52 min  Percent of days with usage greater than or equal to 4 hours: 97.3%  PAP range : 15-20 cm H2O  Average 90% pressure: 16.0 cmH2O  Leak: 37.75 minutes  Average AHI 1.5 events/hr  Mask type: Nasal mask  DME: [facility]    Bed time: 2330  Sleep latency: 30-45 minutes  Number of times awakens during the night: 1  Wake time: 0730  Estimated total sleep time at night: 6 hours  Caffeine intake: 16oz of coffee, 0oz of tea, and 0oz of soda  Alcohol intake: 2 drinks per week  Nap time: none   Sleepiness with Driving: none    [place] - 11    2) Patient denies RLS symptoms.     PMHx, FH, SH reviewed and pertinent changes are: unchanged from last office visit on 2018      REVIEW OF SYSTEMS:   Negative for chest pain, fever, cough, SOA, abdominal pain. Smoking:none      Exam ():  Vitals:    19 0857   BP: 142/74   Pulse: 62   SpO2: 96%     Body mass index is 48.42 kg/m². Patient's Body mass index is 48.42 kg/m². BMI is above normal parameters. Recommendations include: referral to primary care.      Gen:  No acute distress, alert, oriented  Lungs:  CTA with normal effort    CV:  RRR, no M/R/G  GI:  soft, non-tender  Psych:  Appropriate affect      Past Medical History:   Diagnosis Date   • Ankle swelling    • Arthritis    • Back pain    • Diabetes mellitus (CMS/HCC)    • GERD (gastroesophageal reflux disease)    • Heartburn    • History of blood clots    • Hypertension    • Joint pain    • Knee pain    • Muscle pain    • Obesity    • Sleep apnea     c pap       Current Outpatient Medications:   •  acetaminophen (TYLENOL) 650 MG 8 hr tablet, Take 650 mg by mouth Every 8 (Eight) Hours As Needed for Mild Pain ., Disp: , Rfl:   •  apixaban (ELIQUIS) 5 MG tablet tablet, Take 1 tablet by mouth Every 12 (Twelve) Hours., Disp: 180 tablet, Rfl: 3  •  Glucose Blood (FREESTYLE LITE TEST VI), , Disp: , Rfl: 3  •  glucose blood test strip, Qd and prn, Disp: 100 each, Rfl: 4  •  glucose monitor monitoring kit, 1 each Daily. And prn, Disp: 1 each, Rfl: 5  •  HYDROcodone-acetaminophen (NORCO) 5-325 MG per tablet, Take 1 tablet by mouth 2 (Two) Times a Day., Disp: , Rfl:   •  Lancets (FREESTYLE) lancets, Qd and prn, Disp: 100 each, Rfl: 4  •  LISINOPRIL PO, Take 10 mg by mouth 2 (Two) Times a Day., Disp: , Rfl:   •  magnesium oxide (MAGOX) 400 (241.3 MG) MG tablet tablet, Take 400 mg by mouth 2 (Two) Times a Day., Disp: , Rfl:   •  metFORMIN (GLUCOPHAGE) 500 MG tablet, Take 500 mg by mouth Daily With Breakfast., Disp: , Rfl:   •  metoprolol succinate XL (TOPROL-XL) 25 MG 24 hr tablet, Take 25 mg by mouth 2 (Two) Times a Day., Disp: , Rfl:   •  spironolactone (ALDACTONE) 25 MG tablet, Take 25 mg by mouth Daily., Disp: , Rfl:     Total time 15 min, more than half spent in face to face counseling and coordination of care.    RTC in 12 months     This document has been electronically signed by Cleveland Glover MD on January 31, 2019         CC: Chirag Dumont MD          No ref. provider found

## 2019-02-18 ENCOUNTER — APPOINTMENT (OUTPATIENT)
Dept: LAB | Facility: HOSPITAL | Age: 59
End: 2019-02-18

## 2019-02-18 ENCOUNTER — OFFICE VISIT (OUTPATIENT)
Dept: FAMILY MEDICINE CLINIC | Facility: CLINIC | Age: 59
End: 2019-02-18

## 2019-02-18 VITALS
WEIGHT: 308 LBS | DIASTOLIC BLOOD PRESSURE: 80 MMHG | BODY MASS INDEX: 48.34 KG/M2 | HEIGHT: 67 IN | SYSTOLIC BLOOD PRESSURE: 128 MMHG

## 2019-02-18 DIAGNOSIS — E11.9 TYPE 2 DIABETES MELLITUS WITHOUT COMPLICATION, WITHOUT LONG-TERM CURRENT USE OF INSULIN (HCC): Primary | Chronic | ICD-10-CM

## 2019-02-18 DIAGNOSIS — I10 ESSENTIAL HYPERTENSION, BENIGN: Chronic | ICD-10-CM

## 2019-02-18 PROBLEM — I26.99 RECURRENT PULMONARY EMBOLI: Chronic | Status: ACTIVE | Noted: 2018-10-22

## 2019-02-18 LAB — HBA1C MFR BLD: 5.7 % (ref 4–5.6)

## 2019-02-18 PROCEDURE — 36415 COLL VENOUS BLD VENIPUNCTURE: CPT | Performed by: FAMILY MEDICINE

## 2019-02-18 PROCEDURE — 83036 HEMOGLOBIN GLYCOSYLATED A1C: CPT | Performed by: FAMILY MEDICINE

## 2019-02-18 PROCEDURE — 99214 OFFICE O/P EST MOD 30 MIN: CPT | Performed by: FAMILY MEDICINE

## 2019-02-18 RX ORDER — ASPIRIN 81 MG/1
81 TABLET ORAL DAILY
COMMUNITY
End: 2021-09-07

## 2019-02-18 NOTE — PROGRESS NOTES
Subjective   Uriah Willingham is a 58 y.o. male.     History of Present Illness   reevaluation diabetes hypertension history of pulmonary embolism x2 morbid obesity.  In the interim is continued to lose weight electively.  Last lab work was acceptable we do need to do a hemoglobin A1c today.  Has had an eye exam.  Is up-to-date on all other.  Declines flu vaccine.    The following portions of the patient's history were reviewed and updated as appropriate: allergies, current medications, past family history, past medical history, past social history, past surgical history and problem list.    Review of Systems   Constitutional: Negative for activity change, appetite change, fatigue and unexpected weight change.   HENT: Positive for ear pain (Left treated with astringents at home) and rhinorrhea (Mild seasonal). Negative for trouble swallowing and voice change.    Eyes: Negative for redness and visual disturbance.   Respiratory: Negative for cough and wheezing.    Cardiovascular: Negative for chest pain and palpitations.   Gastrointestinal: Negative for abdominal pain, constipation, diarrhea, nausea and vomiting.   Genitourinary: Negative for urgency.   Musculoskeletal: Positive for arthralgias. Negative for joint swelling.   Neurological: Negative for syncope and headaches.   Hematological: Negative for adenopathy.   Psychiatric/Behavioral: Negative for sleep disturbance.       Objective   Physical Exam   Constitutional: He is oriented to person, place, and time. He appears well-developed.   HENT:   Head: Normocephalic.   Right Ear: External ear normal.   Nose: Nose normal.   Mouth/Throat: Oropharynx is clear and moist.   Normal canals bilaterally drums slightly retracted nose boggy mucosa   Eyes: Pupils are equal, round, and reactive to light.   Neck: Normal range of motion. No thyromegaly present.   Cardiovascular: Normal rate, regular rhythm, normal heart sounds and intact distal pulses. Exam reveals no gallop  and no friction rub.   No murmur heard.  Pulmonary/Chest: Breath sounds normal.   Abdominal: Soft. He exhibits no distension and no mass. There is no tenderness.   Musculoskeletal: Normal range of motion.   Trace to 1+ pedal edema bilaterally no skin loss no hair loss   Neurological: He is alert and oriented to person, place, and time. He has normal reflexes.   Skin: Skin is warm and dry.   Psychiatric: He has a normal mood and affect. His speech is normal and behavior is normal.       Assessment/Plan   Uriah was seen today for follow-up.    Diagnoses and all orders for this visit:    Type 2 diabetes mellitus without complication, without long-term current use of insulin (CMS/[facility])  -     Hemoglobin A1c    BMI 50.0-59.9, adult (CMS/[facility])    Essential hypertension, benign      Continue weight loss measures continue medications lab work as above.  Recheck 4 months

## 2019-04-01 DIAGNOSIS — E11.9 TYPE 2 DIABETES MELLITUS WITHOUT COMPLICATION, WITHOUT LONG-TERM CURRENT USE OF INSULIN (HCC): ICD-10-CM

## 2019-04-01 RX ORDER — METFORMIN HYDROCHLORIDE 500 MG/1
TABLET, EXTENDED RELEASE ORAL
Qty: 90 TABLET | Refills: 2 | Status: SHIPPED | OUTPATIENT
Start: 2019-04-01 | End: 2019-11-25 | Stop reason: SDUPTHER

## 2019-05-10 ENCOUNTER — OFFICE VISIT (OUTPATIENT)
Dept: ORTHOPEDIC SURGERY | Facility: CLINIC | Age: 59
End: 2019-05-10

## 2019-05-10 VITALS — BODY MASS INDEX: 49.28 KG/M2 | HEIGHT: 67 IN | WEIGHT: 314 LBS

## 2019-05-10 DIAGNOSIS — M25.562 CHRONIC PAIN OF BOTH KNEES: Primary | ICD-10-CM

## 2019-05-10 DIAGNOSIS — G89.29 CHRONIC PAIN OF BOTH KNEES: Primary | ICD-10-CM

## 2019-05-10 DIAGNOSIS — M25.561 CHRONIC PAIN OF BOTH KNEES: Primary | ICD-10-CM

## 2019-05-10 DIAGNOSIS — M17.0 PRIMARY OSTEOARTHRITIS OF BOTH KNEES: ICD-10-CM

## 2019-05-10 PROCEDURE — 20610 DRAIN/INJ JOINT/BURSA W/O US: CPT | Performed by: ORTHOPAEDIC SURGERY

## 2019-05-10 RX ORDER — TRIAMCINOLONE ACETONIDE 40 MG/ML
80 INJECTION, SUSPENSION INTRA-ARTICULAR; INTRAMUSCULAR
Status: COMPLETED | OUTPATIENT
Start: 2019-05-10 | End: 2019-05-10

## 2019-05-10 RX ADMIN — TRIAMCINOLONE ACETONIDE 80 MG: 40 INJECTION, SUSPENSION INTRA-ARTICULAR; INTRAMUSCULAR at 15:53

## 2019-05-10 RX ADMIN — TRIAMCINOLONE ACETONIDE 80 MG: 40 INJECTION, SUSPENSION INTRA-ARTICULAR; INTRAMUSCULAR at 15:51

## 2019-05-10 NOTE — PROGRESS NOTES
Uriah Willingham is a 58 y.o. male returns for     Chief Complaint   Patient presents with   • Right Knee - Follow-up, Pain   • Left Knee - Pain, Follow-up       HISTORY OF PRESENT ILLNESS:  f/u bilateral knee pain, steroid injection left knee done on 1/23/2019 has helped with pain but has now worn off.  It is been about 4 months since we seen him.  Shots help about 2-1/2 months.  He still on his Eliquis for his blood clot issue.         CONCURRENT MEDICAL HISTORY:    Past Medical History:   Diagnosis Date   • Ankle swelling    • Arthritis    • Back pain    • Diabetes mellitus (CMS/HCC)    • GERD (gastroesophageal reflux disease)    • Heartburn    • History of blood clots    • Hypertension    • Joint pain    • Knee pain    • Muscle pain    • Obesity    • Sleep apnea     c pap       No Known Allergies      Current Outpatient Medications:   •  acetaminophen (TYLENOL) 650 MG 8 hr tablet, Take 650 mg by mouth Every 8 (Eight) Hours As Needed for Mild Pain ., Disp: , Rfl:   •  apixaban (ELIQUIS) 5 MG tablet tablet, Take 1 tablet by mouth Every 12 (Twelve) Hours., Disp: 180 tablet, Rfl: 3  •  aspirin 81 MG EC tablet, Take 81 mg by mouth Daily., Disp: , Rfl:   •  Glucose Blood (FREESTYLE LITE TEST VI), , Disp: , Rfl: 3  •  glucose blood test strip, Qd and prn, Disp: 100 each, Rfl: 4  •  glucose monitor monitoring kit, 1 each Daily. And prn, Disp: 1 each, Rfl: 5  •  Hydrocodone-Acetaminophen (XODOL) 7.5-300 MG per tablet, Take 1 tablet by mouth 2 (Two) Times a Day., Disp: , Rfl:   •  Lancets (FREESTYLE) lancets, Qd and prn, Disp: 100 each, Rfl: 4  •  LISINOPRIL PO, Take 10 mg by mouth 2 (Two) Times a Day., Disp: , Rfl:   •  magnesium oxide (MAGOX) 400 (241.3 MG) MG tablet tablet, Take 400 mg by mouth 2 (Two) Times a Day., Disp: , Rfl:   •  Menthol (BIOFREEZE) 10 % aerosol, Apply  topically to the appropriate area as directed., Disp: , Rfl:   •  metFORMIN (GLUCOPHAGE) 500 MG tablet, Take 500 mg by mouth Daily With  "Breakfast., Disp: , Rfl:   •  metFORMIN ER (GLUCOPHAGE-XR) 500 MG 24 hr tablet, TAKE ONE TABLET BY MOUTH DAILY WITH BREAKFAST, Disp: 90 tablet, Rfl: 2  •  metoprolol succinate XL (TOPROL-XL) 25 MG 24 hr tablet, Take 25 mg by mouth 2 (Two) Times a Day., Disp: , Rfl:   •  spironolactone (ALDACTONE) 25 MG tablet, Take 25 mg by mouth Daily., Disp: , Rfl:     Past Surgical History:   Procedure Laterality Date   • APPENDECTOMY      lap   • CARDIAC CATHETERIZATION N/A 10/8/2018    Procedure: Left Heart Cath with Right Radial Approach;  Surgeon: Artis Garber MD;  Location: [facility] CATH INVASIVE LOCATION;  Service: Cardiology   • COLONOSCOPY  07/22/2014    Diverticulosis in sigmoid colon. Normal cecum & rectum.   • INJECTION OF MEDICATION  11/28/2016    Kenalog (2)      • INJECTION OF MEDICATION  07/26/2013    Rocephin (1)      • INJECTION OF MEDICATION  11/28/2016    Toradol (2)      • TONSILLECTOMY     • WISDOM TOOTH EXTRACTION             ROS: Review of systems has been updated as of today's date.  All other systems are negative except as noted previously.    PHYSICAL EXAMINATION:       Ht 170.2 cm (67\")   Wt (!) 142 kg (314 lb)   BMI 49.18 kg/m²     Physical Exam   Constitutional: He is oriented to person, place, and time. He appears well-developed.   HENT:   Head: Normocephalic and atraumatic.   Eyes: EOM are normal. Pupils are equal, round, and reactive to light.   Neck: Neck supple. No tracheal deviation present.   Pulmonary/Chest: Effort normal.   Musculoskeletal: Normal range of motion. He exhibits edema and deformity.   Neurological: He is alert and oriented to person, place, and time.   Skin: Skin is warm and dry. No erythema.   Psychiatric: He has a normal mood and affect.       GAIT:     [x]  Normal  []  Antalgic    Assistive device: []  None  []  Walker     []  Crutches  []  Cane     []  Wheelchair  []  Stretcher    Ortho Exam  Venous stasis changes and distal pitting edema.  Tender medial joint line " motion pretty well-maintained ligaments are grossly stable    No results found.    Large Joint Arthrocentesis: R knee  Date/Time: 5/10/2019 3:51 PM  Consent given by: patient  Site marked: site marked  Timeout: Immediately prior to procedure a time out was called to verify the correct patient, procedure, equipment, support staff and site/side marked as required   Supporting Documentation  Indications: pain   Procedure Details  Location: knee - R knee  Preparation: Patient was prepped and draped in the usual sterile fashion  Needle size: 22 G  Approach: anteromedial  Medications administered: 80 mg triamcinolone acetonide 40 MG/ML (4cc lidocaine nd [id number] lot -dk)  Patient tolerance: patient tolerated the procedure well with no immediate complications    Large Joint Arthrocentesis: L knee  Date/Time: 5/10/2019 3:53 PM  Consent given by: patient  Site marked: site marked  Timeout: Immediately prior to procedure a time out was called to verify the correct patient, procedure, equipment, support staff and site/side marked as required   Supporting Documentation  Indications: pain   Procedure Details  Location: knee - L knee  Preparation: Patient was prepped and draped in the usual sterile fashion  Needle size: 22 G  Approach: anteromedial  Medications administered: 80 mg triamcinolone acetonide 40 MG/ML (4cc lidocaine ndc [id number] lot -dk)  Patient tolerance: patient tolerated the procedure well with no immediate complications              ASSESSMENT:    Diagnoses and all orders for this visit:    Chronic pain of both knees  -     Large Joint Arthrocentesis: R knee  -     Large Joint Arthrocentesis: L knee    Primary osteoarthritis of both knees  -     Large Joint Arthrocentesis: R knee  -     Large Joint Arthrocentesis: L knee          PLAN his blood sugars are maintained stable we will inject both knees today with mixture of Kenalog and Xylocaine as above.  We will anticipate this will last 2 to 3  months he is having a great response would like to have some relief today.  I think he is a good candidate for Visco supplementation as as such we will get him approved for Synvisc 1.  I will see him back once we get the Synvisc 1 and plan on adding this as I think we need to maximize his conservative care.  He is not a very good candidate for knee replacement for a number of reasons which she understands.    No Follow-up on file.      This document has been electronically signed by Aime Cedillo MD on May 10, 2019 4:45 PM

## 2019-06-24 ENCOUNTER — OFFICE VISIT (OUTPATIENT)
Dept: FAMILY MEDICINE CLINIC | Facility: CLINIC | Age: 59
End: 2019-06-24

## 2019-06-24 ENCOUNTER — APPOINTMENT (OUTPATIENT)
Dept: LAB | Facility: HOSPITAL | Age: 59
End: 2019-06-24

## 2019-06-24 VITALS
HEIGHT: 67 IN | WEIGHT: 315 LBS | DIASTOLIC BLOOD PRESSURE: 80 MMHG | BODY MASS INDEX: 49.44 KG/M2 | SYSTOLIC BLOOD PRESSURE: 126 MMHG

## 2019-06-24 DIAGNOSIS — I87.2 VENOUS INSUFFICIENCY: Chronic | ICD-10-CM

## 2019-06-24 DIAGNOSIS — I10 ESSENTIAL HYPERTENSION, BENIGN: Chronic | ICD-10-CM

## 2019-06-24 DIAGNOSIS — I26.99 RECURRENT PULMONARY EMBOLI (HCC): Chronic | ICD-10-CM

## 2019-06-24 DIAGNOSIS — E11.9 TYPE 2 DIABETES MELLITUS WITHOUT COMPLICATION, WITHOUT LONG-TERM CURRENT USE OF INSULIN (HCC): Primary | Chronic | ICD-10-CM

## 2019-06-24 PROBLEM — I26.92 ACUTE SADDLE PULMONARY EMBOLISM WITHOUT ACUTE COR PULMONALE (HCC): Chronic | Status: RESOLVED | Noted: 2018-10-13 | Resolved: 2019-06-24

## 2019-06-24 LAB — HBA1C MFR BLD: 5.74 % (ref 4.8–5.6)

## 2019-06-24 PROCEDURE — 99214 OFFICE O/P EST MOD 30 MIN: CPT | Performed by: FAMILY MEDICINE

## 2019-06-24 PROCEDURE — 36415 COLL VENOUS BLD VENIPUNCTURE: CPT | Performed by: FAMILY MEDICINE

## 2019-06-24 PROCEDURE — 83036 HEMOGLOBIN GLYCOSYLATED A1C: CPT | Performed by: FAMILY MEDICINE

## 2019-06-24 NOTE — PROGRESS NOTES
Subjective   Uriah Willingham is a 58 y.o. male.     History of Present Illness   follow-up diabetes type 2 hypertension obesity venous insufficiency history of pulmonary embolism.  In interim maintain weight and diet.  Having some mild venous changes which are chronic but improved.  Last sugar within normal limits.  Time for repeat today.  Overall stable doing well declines vaccine.    The following portions of the patient's history were reviewed and updated as appropriate: allergies, current medications, past family history, past medical history, past social history, past surgical history and problem list.    Review of Systems   Constitutional: Negative for activity change, appetite change, fatigue and unexpected weight change.   HENT: Negative for trouble swallowing and voice change.    Eyes: Negative for redness and visual disturbance.   Respiratory: Negative for cough and wheezing.    Cardiovascular: Positive for leg swelling (Venous insufficiency chronic stable). Negative for chest pain and palpitations.   Gastrointestinal: Negative for abdominal pain, constipation, diarrhea, nausea and vomiting.   Genitourinary: Negative for urgency.   Musculoskeletal: Positive for arthralgias (DJD hips and knees stable). Negative for joint swelling.   Neurological: Negative for syncope and headaches.   Hematological: Negative for adenopathy.   Psychiatric/Behavioral: Negative for sleep disturbance.       Objective   Physical Exam   Constitutional: He is oriented to person, place, and time. He appears well-developed.   HENT:   Head: Normocephalic.   Nose: Nose normal.   Eyes: Pupils are equal, round, and reactive to light.   Neck: Normal range of motion. No thyromegaly present.   Cardiovascular: Normal rate, regular rhythm, normal heart sounds and intact distal pulses. Exam reveals no gallop and no friction rub.   No murmur heard.  Pulmonary/Chest: Breath sounds normal.   Abdominal: Soft. He exhibits no distension and no  mass. There is no tenderness.   Musculoskeletal: Normal range of motion.   Trace to 1+ peripheral edema mild chronic venous changes no skin breakdown feet intact   Neurological: He is alert and oriented to person, place, and time. He has normal reflexes.   Skin: Skin is warm and dry.   Psychiatric: He has a normal mood and affect.       Assessment/Plan   Uriah was seen today for diabetes.    Diagnoses and all orders for this visit:    Type 2 diabetes mellitus without complication, without long-term current use of insulin (CMS/[facility])  -     Hemoglobin A1c    Essential hypertension, benign    Venous insufficiency    Recurrent pulmonary emboli (CMS/[facility])    BMI 45.0-49.9, adult (CMS/[facility])       on wt loss measures and programs  Counseled again on weight loss measures elevations lifestyle measures discussed continue medicines lab work recheck 4 months

## 2019-08-09 DIAGNOSIS — G89.29 CHRONIC PAIN OF BOTH KNEES: Primary | ICD-10-CM

## 2019-08-09 DIAGNOSIS — M25.561 CHRONIC PAIN OF BOTH KNEES: Primary | ICD-10-CM

## 2019-08-09 DIAGNOSIS — M25.562 CHRONIC PAIN OF BOTH KNEES: Primary | ICD-10-CM

## 2019-08-12 ENCOUNTER — OFFICE VISIT (OUTPATIENT)
Dept: ORTHOPEDIC SURGERY | Facility: CLINIC | Age: 59
End: 2019-08-12

## 2019-08-12 VITALS — HEIGHT: 67 IN | WEIGHT: 315 LBS | BODY MASS INDEX: 49.44 KG/M2

## 2019-08-12 DIAGNOSIS — M17.0 PRIMARY OSTEOARTHRITIS OF BOTH KNEES: ICD-10-CM

## 2019-08-12 DIAGNOSIS — G89.29 CHRONIC PAIN OF BOTH KNEES: Primary | ICD-10-CM

## 2019-08-12 DIAGNOSIS — M25.561 CHRONIC PAIN OF BOTH KNEES: Primary | ICD-10-CM

## 2019-08-12 DIAGNOSIS — M25.562 CHRONIC PAIN OF BOTH KNEES: Primary | ICD-10-CM

## 2019-08-12 PROCEDURE — 20610 DRAIN/INJ JOINT/BURSA W/O US: CPT | Performed by: ORTHOPAEDIC SURGERY

## 2019-08-12 PROCEDURE — 99213 OFFICE O/P EST LOW 20 MIN: CPT | Performed by: ORTHOPAEDIC SURGERY

## 2019-08-12 RX ORDER — TRIAMCINOLONE ACETONIDE 40 MG/ML
80 INJECTION, SUSPENSION INTRA-ARTICULAR; INTRAMUSCULAR
Status: COMPLETED | OUTPATIENT
Start: 2019-08-12 | End: 2019-08-12

## 2019-08-12 RX ORDER — LIDOCAINE HYDROCHLORIDE 10 MG/ML
4 INJECTION, SOLUTION EPIDURAL; INFILTRATION; INTRACAUDAL; PERINEURAL
Status: COMPLETED | OUTPATIENT
Start: 2019-08-12 | End: 2019-08-12

## 2019-08-12 RX ADMIN — LIDOCAINE HYDROCHLORIDE 4 ML: 10 INJECTION, SOLUTION EPIDURAL; INFILTRATION; INTRACAUDAL; PERINEURAL at 11:00

## 2019-08-12 RX ADMIN — TRIAMCINOLONE ACETONIDE 80 MG: 40 INJECTION, SUSPENSION INTRA-ARTICULAR; INTRAMUSCULAR at 11:01

## 2019-08-12 RX ADMIN — TRIAMCINOLONE ACETONIDE 80 MG: 40 INJECTION, SUSPENSION INTRA-ARTICULAR; INTRAMUSCULAR at 11:00

## 2019-08-12 RX ADMIN — LIDOCAINE HYDROCHLORIDE 4 ML: 10 INJECTION, SOLUTION EPIDURAL; INFILTRATION; INTRACAUDAL; PERINEURAL at 11:01

## 2019-08-12 NOTE — PROGRESS NOTES
Uriah Willingham is a 58 y.o. male returns for     Chief Complaint   Patient presents with   • Left Knee - Follow-up, Pain   • Right Knee - Pain, Follow-up       HISTORY OF PRESENT ILLNESS: f/u supriya. Knee pain. Patient requesting evaluation for steroid injection, last injection done on 5/10/2019.  Is only helped him for about 3 months.  xrays done today.        CONCURRENT MEDICAL HISTORY:    Past Medical History:   Diagnosis Date   • Ankle swelling    • Arthritis    • Back pain    • Diabetes mellitus (CMS/HCC)    • GERD (gastroesophageal reflux disease)    • Heartburn    • History of blood clots    • Hypertension    • Joint pain    • Knee pain    • Muscle pain    • Obesity    • Sleep apnea     c pap       No Known Allergies      Current Outpatient Medications:   •  acetaminophen (TYLENOL) 650 MG 8 hr tablet, Take 650 mg by mouth Every 8 (Eight) Hours As Needed for Mild Pain ., Disp: , Rfl:   •  apixaban (ELIQUIS) 5 MG tablet tablet, Take 1 tablet by mouth Every 12 (Twelve) Hours., Disp: 180 tablet, Rfl: 3  •  aspirin 81 MG EC tablet, Take 81 mg by mouth Daily., Disp: , Rfl:   •  Glucose Blood (FREESTYLE LITE TEST VI), , Disp: , Rfl: 3  •  glucose blood test strip, Qd and prn, Disp: 100 each, Rfl: 4  •  glucose monitor monitoring kit, 1 each Daily. And prn, Disp: 1 each, Rfl: 5  •  Hydrocodone-Acetaminophen (XODOL) 7.5-300 MG per tablet, Take 1 tablet by mouth 2 (Two) Times a Day., Disp: , Rfl:   •  Lancets (FREESTYLE) lancets, Qd and prn, Disp: 100 each, Rfl: 4  •  LISINOPRIL PO, Take 10 mg by mouth 2 (Two) Times a Day., Disp: , Rfl:   •  magnesium oxide (MAGOX) 400 (241.3 MG) MG tablet tablet, Take 400 mg by mouth 2 (Two) Times a Day., Disp: , Rfl:   •  Menthol (BIOFREEZE) 10 % aerosol, Apply  topically to the appropriate area as directed., Disp: , Rfl:   •  metFORMIN (GLUCOPHAGE) 500 MG tablet, Take 500 mg by mouth Daily With Breakfast., Disp: , Rfl:   •  metFORMIN ER (GLUCOPHAGE-XR) 500 MG 24 hr tablet, TAKE  ONE TABLET BY MOUTH DAILY WITH BREAKFAST, Disp: 90 tablet, Rfl: 2  •  metoprolol succinate XL (TOPROL-XL) 25 MG 24 hr tablet, Take 25 mg by mouth 2 (Two) Times a Day., Disp: , Rfl:   •  spironolactone (ALDACTONE) 25 MG tablet, Take 25 mg by mouth Daily., Disp: , Rfl:     Past Surgical History:   Procedure Laterality Date   • APPENDECTOMY      lap   • CARDIAC CATHETERIZATION N/A 10/8/2018    Procedure: Left Heart Cath with Right Radial Approach;  Surgeon: Artis Garber MD;  Location: [facility] CATH INVASIVE LOCATION;  Service: Cardiology   • COLONOSCOPY  07/22/2014    Diverticulosis in sigmoid colon. Normal cecum & rectum.   • INJECTION OF MEDICATION  11/28/2016    Kenalog (2)      • INJECTION OF MEDICATION  07/26/2013    Rocephin (1)      • INJECTION OF MEDICATION  11/28/2016    Toradol (2)      • TONSILLECTOMY     • WISDOM TOOTH EXTRACTION             ROS: Review of systems has been updated as of today's date.  All other systems are negative except as noted previously.    PHYSICAL EXAMINATION:       There were no vitals taken for this visit.    Physical Exam   Constitutional: He is oriented to person, place, and time. He appears well-developed.   HENT:   Head: Normocephalic and atraumatic.   Eyes: EOM are normal. Pupils are equal, round, and reactive to light.   Neck: Neck supple. No tracheal deviation present.   Pulmonary/Chest: Effort normal.   Musculoskeletal: He exhibits tenderness and deformity. He exhibits no edema.   Neurological: He is alert and oriented to person, place, and time.   Skin: Skin is warm and dry. No erythema.   Psychiatric: He has a normal mood and affect.       GAIT:     []  Normal  []  Antalgic    Assistive device: []  None  []  Walker     []  Crutches  []  Cane     []  Wheelchair  []  Stretcher    Ortho Exam  Significant varus deformity neurovascular intact.  Calves are negative for tenderness marked venous stasis changes there is swelling down to the ankles.    No results found.  Repeat  x-rays show severe osteoarthritis bilateral knees  Large Joint Arthrocentesis: R knee  Date/Time: 8/12/2019 11:00 AM  Consent given by: patient  Site marked: site marked  Timeout: Immediately prior to procedure a time out was called to verify the correct patient, procedure, equipment, support staff and site/side marked as required   Supporting Documentation  Indications: pain   Procedure Details  Location: knee - R knee  Preparation: Patient was prepped and draped in the usual sterile fashion  Needle size: 22 G  Approach: anteromedial  Medications administered: 4 mL lidocaine PF 1% 1 %; 80 mg triamcinolone acetonide 40 MG/ML  Patient tolerance: patient tolerated the procedure well with no immediate complications    Large Joint Arthrocentesis: L knee  Date/Time: 8/12/2019 11:01 AM  Consent given by: patient  Site marked: site marked  Timeout: Immediately prior to procedure a time out was called to verify the correct patient, procedure, equipment, support staff and site/side marked as required   Supporting Documentation  Indications: pain   Procedure Details  Location: knee - L knee  Preparation: Patient was prepped and draped in the usual sterile fashion  Needle size: 22 G  Approach: anteromedial  Medications administered: 80 mg triamcinolone acetonide 40 MG/ML; 4 mL lidocaine PF 1% 1 %  Patient tolerance: patient tolerated the procedure well with no immediate complications              ASSESSMENT:    Diagnoses and all orders for this visit:    Chronic pain of both knees    Primary osteoarthritis of both knees          PLAN he is not responding to the injections.  With his history of blood clots in his venous stasis disease he is not a good surgical candidate.  His BMI is also 50.  I discussed this with him when he is to modify this and get this down to 40 at least.  We will inject each knee again today with steroid.  He tells me his insurance company denied the Visco supplementation which I believe is medically  necessary will be a good option for him.  I would like to resubmit this for Visco supplementation.  He is running out of options here is not at this point responding very well to the steroid meaning it is not lasting very long time.  The Visco supplementation will be a good option at this point we will try to resubmit this and get it approved I would like to talk and do a exemption if we could.    No Follow-up on file.      This document has been electronically signed by Joie Nguyen MA on August 12, 2019 11:00 AM

## 2019-10-03 NOTE — TELEPHONE ENCOUNTER
Nguyen    Wife called and said he is still having a lot of pain with his knees.  His insurance will not pay for other injections.  He is not due for steroid injection until November.  Wonder what he can do for pain, thought maybe steroids by mouth.  Please call

## 2019-10-07 RX ORDER — TRAMADOL HYDROCHLORIDE 50 MG/1
50 TABLET ORAL EVERY 8 HOURS PRN
Qty: 20 TABLET | Refills: 0 | OUTPATIENT
Start: 2019-10-07 | End: 2019-10-25

## 2019-10-07 NOTE — TELEPHONE ENCOUNTER
Could put him on a Dosepak if he is not diabetic.  If he is diabetic could give him a little Ultram may be 20 pills

## 2019-10-08 ENCOUNTER — TELEPHONE (OUTPATIENT)
Dept: ORTHOPEDIC SURGERY | Facility: CLINIC | Age: 59
End: 2019-10-08

## 2019-10-08 NOTE — TELEPHONE ENCOUNTER
DR SWENSON.  [facility] CALLED STATING THE PATIENT WAS GIVEN TRAMADOL YESTERDAY, 10/07/19.  THEY WANTED TO MAKE YOU AWARE THAT HE WAS GIVEN HYDROCODONE 7.5/325 MG ON 09/27/19 BY ANOTHER PHYSICIAN.

## 2019-10-08 NOTE — TELEPHONE ENCOUNTER
This has been called in to [facility] in [place]. Tried calling patient to notify. Left voicemail to return call

## 2019-10-25 ENCOUNTER — OFFICE VISIT (OUTPATIENT)
Dept: FAMILY MEDICINE CLINIC | Facility: CLINIC | Age: 59
End: 2019-10-25

## 2019-10-25 ENCOUNTER — APPOINTMENT (OUTPATIENT)
Dept: LAB | Facility: HOSPITAL | Age: 59
End: 2019-10-25

## 2019-10-25 VITALS
WEIGHT: 315 LBS | SYSTOLIC BLOOD PRESSURE: 122 MMHG | HEIGHT: 67 IN | BODY MASS INDEX: 49.44 KG/M2 | DIASTOLIC BLOOD PRESSURE: 78 MMHG

## 2019-10-25 DIAGNOSIS — I87.2 VENOUS INSUFFICIENCY: Chronic | ICD-10-CM

## 2019-10-25 DIAGNOSIS — E11.9 TYPE 2 DIABETES MELLITUS WITHOUT COMPLICATION, WITHOUT LONG-TERM CURRENT USE OF INSULIN (HCC): Primary | Chronic | ICD-10-CM

## 2019-10-25 DIAGNOSIS — I10 ESSENTIAL HYPERTENSION, BENIGN: Chronic | ICD-10-CM

## 2019-10-25 DIAGNOSIS — I26.99 RECURRENT PULMONARY EMBOLI (HCC): Chronic | ICD-10-CM

## 2019-10-25 DIAGNOSIS — I87.2 VENOUS STASIS DERMATITIS OF BOTH LOWER EXTREMITIES: ICD-10-CM

## 2019-10-25 PROBLEM — R07.9 CHEST PAIN: Status: RESOLVED | Noted: 2018-10-03 | Resolved: 2019-10-25

## 2019-10-25 LAB
ALBUMIN SERPL-MCNC: 4 G/DL (ref 3.5–5.2)
ALBUMIN UR-MCNC: <1.2 MG/DL
ALBUMIN/GLOB SERPL: 1.1 G/DL
ALP SERPL-CCNC: 72 U/L (ref 39–117)
ALT SERPL W P-5'-P-CCNC: 19 U/L (ref 1–41)
ANION GAP SERPL CALCULATED.3IONS-SCNC: 10.2 MMOL/L (ref 5–15)
AST SERPL-CCNC: 21 U/L (ref 1–40)
BILIRUB SERPL-MCNC: 0.9 MG/DL (ref 0.2–1.2)
BUN BLD-MCNC: 15 MG/DL (ref 6–20)
BUN/CREAT SERPL: 18.3 (ref 7–25)
CALCIUM SPEC-SCNC: 9.4 MG/DL (ref 8.6–10.5)
CHLORIDE SERPL-SCNC: 103 MMOL/L (ref 98–107)
CHOLEST SERPL-MCNC: 170 MG/DL (ref 0–200)
CO2 SERPL-SCNC: 24.8 MMOL/L (ref 22–29)
CREAT BLD-MCNC: 0.82 MG/DL (ref 0.76–1.27)
GFR SERPL CREATININE-BSD FRML MDRD: 97 ML/MIN/1.73
GLOBULIN UR ELPH-MCNC: 3.5 GM/DL
GLUCOSE BLD-MCNC: 110 MG/DL (ref 65–99)
HBA1C MFR BLD: 5.67 % (ref 4.8–5.6)
HDLC SERPL-MCNC: 40 MG/DL (ref 40–60)
LDLC SERPL CALC-MCNC: 95 MG/DL (ref 0–100)
LDLC/HDLC SERPL: 2.38 {RATIO}
MAGNESIUM SERPL-MCNC: 2.1 MG/DL (ref 1.6–2.6)
POTASSIUM BLD-SCNC: 4.3 MMOL/L (ref 3.5–5.2)
PROT SERPL-MCNC: 7.5 G/DL (ref 6–8.5)
SODIUM BLD-SCNC: 138 MMOL/L (ref 136–145)
TRIGL SERPL-MCNC: 174 MG/DL (ref 0–150)
VLDLC SERPL-MCNC: 34.8 MG/DL (ref 5–40)

## 2019-10-25 PROCEDURE — 83735 ASSAY OF MAGNESIUM: CPT | Performed by: FAMILY MEDICINE

## 2019-10-25 PROCEDURE — 36415 COLL VENOUS BLD VENIPUNCTURE: CPT | Performed by: FAMILY MEDICINE

## 2019-10-25 PROCEDURE — 82043 UR ALBUMIN QUANTITATIVE: CPT | Performed by: FAMILY MEDICINE

## 2019-10-25 PROCEDURE — 80061 LIPID PANEL: CPT | Performed by: FAMILY MEDICINE

## 2019-10-25 PROCEDURE — 80053 COMPREHEN METABOLIC PANEL: CPT | Performed by: FAMILY MEDICINE

## 2019-10-25 PROCEDURE — 83036 HEMOGLOBIN GLYCOSYLATED A1C: CPT | Performed by: FAMILY MEDICINE

## 2019-10-25 PROCEDURE — 99214 OFFICE O/P EST MOD 30 MIN: CPT | Performed by: FAMILY MEDICINE

## 2019-10-25 RX ORDER — TRIAMCINOLONE ACETONIDE 5 MG/G
CREAM TOPICAL 3 TIMES DAILY
Qty: 120 G | Refills: 3 | Status: SHIPPED | OUTPATIENT
Start: 2019-10-25
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
## 2019-11-11 ENCOUNTER — OFFICE VISIT (OUTPATIENT)
Dept: ORTHOPEDIC SURGERY | Facility: CLINIC | Age: 59
End: 2019-11-11

## 2019-11-11 VITALS — HEIGHT: 67 IN | WEIGHT: 315 LBS | BODY MASS INDEX: 49.44 KG/M2

## 2019-11-11 DIAGNOSIS — M17.0 PRIMARY OSTEOARTHRITIS OF BOTH KNEES: ICD-10-CM

## 2019-11-11 DIAGNOSIS — M25.562 CHRONIC PAIN OF BOTH KNEES: Primary | ICD-10-CM

## 2019-11-11 DIAGNOSIS — M25.561 CHRONIC PAIN OF BOTH KNEES: Primary | ICD-10-CM

## 2019-11-11 DIAGNOSIS — G89.29 CHRONIC PAIN OF BOTH KNEES: Primary | ICD-10-CM

## 2019-11-11 PROCEDURE — 99213 OFFICE O/P EST LOW 20 MIN: CPT | Performed by: ORTHOPAEDIC SURGERY

## 2019-11-11 PROCEDURE — 20610 DRAIN/INJ JOINT/BURSA W/O US: CPT | Performed by: ORTHOPAEDIC SURGERY

## 2019-11-11 RX ORDER — TRIAMCINOLONE ACETONIDE 40 MG/ML
80 INJECTION, SUSPENSION INTRA-ARTICULAR; INTRAMUSCULAR
Status: COMPLETED | OUTPATIENT
Start: 2019-11-11 | End: 2019-11-11

## 2019-11-11 RX ORDER — LIDOCAINE HYDROCHLORIDE 10 MG/ML
4 INJECTION, SOLUTION EPIDURAL; INFILTRATION; INTRACAUDAL; PERINEURAL
Status: COMPLETED | OUTPATIENT
Start: 2019-11-11 | End: 2019-11-11

## 2019-11-11 RX ADMIN — TRIAMCINOLONE ACETONIDE 80 MG: 40 INJECTION, SUSPENSION INTRA-ARTICULAR; INTRAMUSCULAR at 09:34

## 2019-11-11 RX ADMIN — LIDOCAINE HYDROCHLORIDE 4 ML: 10 INJECTION, SOLUTION EPIDURAL; INFILTRATION; INTRACAUDAL; PERINEURAL at 09:34
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
## 2019-11-25 DIAGNOSIS — I26.99 RECURRENT PULMONARY EMBOLI (HCC): ICD-10-CM

## 2019-11-25 DIAGNOSIS — E11.9 TYPE 2 DIABETES MELLITUS WITHOUT COMPLICATION, WITHOUT LONG-TERM CURRENT USE OF INSULIN (HCC): ICD-10-CM

## 2019-11-25 RX ORDER — APIXABAN 5 MG/1
TABLET, FILM COATED ORAL
Qty: 180 TABLET | Refills: 2 | Status: SHIPPED | OUTPATIENT
Start: 2019-11-25 | End: 2020-12-11

## 2019-11-25 RX ORDER — METFORMIN HYDROCHLORIDE 500 MG/1
TABLET, EXTENDED RELEASE ORAL
Qty: 90 TABLET | Refills: 1 | Status: SHIPPED | OUTPATIENT
Start: 2019-11-25 | End: 2020-05-26

## 2020-02-05 ENCOUNTER — OFFICE VISIT (OUTPATIENT)
Dept: SLEEP MEDICINE | Facility: HOSPITAL | Age: 60
End: 2020-02-05

## 2020-02-05 ENCOUNTER — HOSPITAL ENCOUNTER (OUTPATIENT)
Dept: GENERAL RADIOLOGY | Facility: HOSPITAL | Age: 60
Discharge: HOME | End: 2020-02-05
Admitting: PAIN MEDICINE

## 2020-02-05 VITALS
HEART RATE: 79 BPM | WEIGHT: 315 LBS | BODY MASS INDEX: 49.44 KG/M2 | OXYGEN SATURATION: 96 % | SYSTOLIC BLOOD PRESSURE: 118 MMHG | HEIGHT: 67 IN | DIASTOLIC BLOOD PRESSURE: 71 MMHG

## 2020-02-05 DIAGNOSIS — G47.33 OBSTRUCTIVE SLEEP APNEA, ADULT: Primary | ICD-10-CM

## 2020-02-05 DIAGNOSIS — M47.816 SPONDYLOSIS WITHOUT MYELOPATHY OR RADICULOPATHY, LUMBAR REGION: ICD-10-CM

## 2020-02-05 PROCEDURE — 99213 OFFICE O/P EST LOW 20 MIN: CPT | Performed by: NURSE PRACTITIONER

## 2020-02-05 PROCEDURE — 72100 X-RAY EXAM L-S SPINE 2/3 VWS: CPT

## 2020-02-05 RX ORDER — OXYCODONE AND ACETAMINOPHEN 10; 325 MG/1; MG/1
TABLET ORAL
COMMUNITY
End: 2020-08-19
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
## 2020-02-24 ENCOUNTER — OFFICE VISIT (OUTPATIENT)
Dept: ORTHOPEDIC SURGERY | Facility: CLINIC | Age: 60
End: 2020-02-24

## 2020-02-24 VITALS — WEIGHT: 315 LBS | HEIGHT: 67 IN | BODY MASS INDEX: 49.44 KG/M2

## 2020-02-24 DIAGNOSIS — M25.562 CHRONIC PAIN OF BOTH KNEES: Primary | ICD-10-CM

## 2020-02-24 DIAGNOSIS — G89.29 CHRONIC PAIN OF BOTH KNEES: Primary | ICD-10-CM

## 2020-02-24 DIAGNOSIS — M25.561 CHRONIC PAIN OF BOTH KNEES: Primary | ICD-10-CM

## 2020-02-24 DIAGNOSIS — M17.0 PRIMARY OSTEOARTHRITIS OF BOTH KNEES: ICD-10-CM

## 2020-02-24 PROCEDURE — 20610 DRAIN/INJ JOINT/BURSA W/O US: CPT | Performed by: ORTHOPAEDIC SURGERY

## 2020-02-24 RX ORDER — LIDOCAINE HYDROCHLORIDE 10 MG/ML
4 INJECTION, SOLUTION EPIDURAL; INFILTRATION; INTRACAUDAL; PERINEURAL
Status: COMPLETED | OUTPATIENT
Start: 2020-02-24 | End: 2020-02-24

## 2020-02-24 RX ORDER — TRIAMCINOLONE ACETONIDE 40 MG/ML
80 INJECTION, SUSPENSION INTRA-ARTICULAR; INTRAMUSCULAR
Status: COMPLETED | OUTPATIENT
Start: 2020-02-24 | End: 2020-02-24

## 2020-02-24 RX ADMIN — LIDOCAINE HYDROCHLORIDE 4 ML: 10 INJECTION, SOLUTION EPIDURAL; INFILTRATION; INTRACAUDAL; PERINEURAL at 10:18

## 2020-02-24 RX ADMIN — TRIAMCINOLONE ACETONIDE 80 MG: 40 INJECTION, SUSPENSION INTRA-ARTICULAR; INTRAMUSCULAR at 10:18

## 2020-02-24 RX ADMIN — TRIAMCINOLONE ACETONIDE 80 MG: 40 INJECTION, SUSPENSION INTRA-ARTICULAR; INTRAMUSCULAR at 10:17

## 2020-02-24 RX ADMIN — LIDOCAINE HYDROCHLORIDE 4 ML: 10 INJECTION, SOLUTION EPIDURAL; INFILTRATION; INTRACAUDAL; PERINEURAL at 10:17

## 2020-02-24 NOTE — PROGRESS NOTES
Uriah Willingham is a 59 y.o. male returns for     Chief Complaint   Patient presents with   • Right Knee - Follow-up, Pain   • Left Knee - Pain, Follow-up       HISTORY OF PRESENT ILLNESS: f/u bilateral knee pain. Patient would like evaluation for steroid injections, last injection done on 11/11/2019.  Is been over 3 months since we have seen him.  He seen a nerve doctor and there is a question of a nerve block they are talking about doing.  I discussed this with him and told him that lateral genicular nerve blocks may be helpful.       CONCURRENT MEDICAL HISTORY:    Past Medical History:   Diagnosis Date   • Ankle swelling    • Arthritis    • Back pain    • Diabetes mellitus (CMS/HCC)    • GERD (gastroesophageal reflux disease)    • Heartburn    • History of blood clots    • Hypertension    • Joint pain    • Knee pain    • Muscle pain    • Obesity    • Sleep apnea     c pap       No Known Allergies      Current Outpatient Medications:   •  acetaminophen (TYLENOL) 650 MG 8 hr tablet, Take 650 mg by mouth Every 8 (Eight) Hours As Needed for Mild Pain ., Disp: , Rfl:   •  aspirin 81 MG EC tablet, Take 81 mg by mouth Daily., Disp: , Rfl:   •  ELIQUIS 5 MG tablet tablet, TAKE ONE TABLET BY MOUTH EVERY 12 HOURS, Disp: 180 tablet, Rfl: 2  •  Glucose Blood (FREESTYLE LITE TEST VI), , Disp: , Rfl: 3  •  glucose blood test strip, Qd and prn, Disp: 100 each, Rfl: 4  •  glucose monitor monitoring kit, 1 each Daily. And prn, Disp: 1 each, Rfl: 5  •  Hydrocodone-Acetaminophen (XODOL) 7.5-300 MG per tablet, Take 1 tablet by mouth 2 (Two) Times a Day., Disp: , Rfl:   •  Lancets (FREESTYLE) lancets, Qd and prn, Disp: 100 each, Rfl: 4  •  LISINOPRIL PO, Take 10 mg by mouth 2 (Two) Times a Day., Disp: , Rfl:   •  magnesium oxide (MAGOX) 400 (241.3 MG) MG tablet tablet, Take 400 mg by mouth 2 (Two) Times a Day., Disp: , Rfl:   •  Menthol (BIOFREEZE) 10 % aerosol, Apply  topically to the appropriate area as directed., Disp: , Rfl:    "  •  metFORMIN ER (GLUCOPHAGE-XR) 500 MG 24 hr tablet, TAKE ONE TABLET BY MOUTH DAILY WITH BREAKFAST, Disp: 90 tablet, Rfl: 1  •  metoprolol succinate XL (TOPROL-XL) 25 MG 24 hr tablet, Take 25 mg by mouth 2 (Two) Times a Day., Disp: , Rfl:   •  spironolactone (ALDACTONE) 25 MG tablet, Take 25 mg by mouth Daily., Disp: , Rfl:   •  triamcinolone (KENALOG) 0.5 % cream, Apply  topically to the appropriate area as directed 3 (Three) Times a Day. To legs x 2 wks then prn, Disp: 120 g, Rfl: 3  •  oxyCODONE-acetaminophen (PERCOCET)  MG per tablet, oxycodone-acetaminophen 10 mg-325 mg tablet, Disp: , Rfl:     Past Surgical History:   Procedure Laterality Date   • APPENDECTOMY      lap   • CARDIAC CATHETERIZATION N/A 10/8/2018    Procedure: Left Heart Cath with Right Radial Approach;  Surgeon: Artis Garber MD;  Location: [facility] INVASIVE LOCATION;  Service: Cardiology   • COLONOSCOPY  07/22/2014    Diverticulosis in sigmoid colon. Normal cecum & rectum.   • INJECTION OF MEDICATION  11/28/2016    Kenalog (2)      • INJECTION OF MEDICATION  07/26/2013    Rocephin (1)      • INJECTION OF MEDICATION  11/28/2016    Toradol (2)      • TONSILLECTOMY     • WISDOM TOOTH EXTRACTION             ROS: Review of systems has been updated as of today's date.  All other systems are negative except as noted previously.    PHYSICAL EXAMINATION:       Ht 170.2 cm (67\")   Wt (!) 152 kg (334 lb)   BMI 52.31 kg/m²     Physical Exam   Constitutional: He is oriented to person, place, and time. He appears well-developed.   HENT:   Head: Normocephalic and atraumatic.   Eyes: Pupils are equal, round, and reactive to light. EOM are normal.   Neck: Neck supple. No tracheal deviation present.   Pulmonary/Chest: Effort normal.   Musculoskeletal: He exhibits edema and tenderness. He exhibits no deformity.   Neurological: He is alert and oriented to person, place, and time.   Skin: Skin is warm and dry. No erythema.   Psychiatric: He has a " normal mood and affect.       GAIT:     []  Normal  []  Antalgic    Assistive device: [x]  None  []  Walker     []  Crutches  []  Cane     []  Wheelchair  []  Stretcher    Ortho Exam  Motion is unchanged.  Neurovascular intact.  Venous stasis changes distally.  Pretty significant edema tender medial joint line.    Large Joint Arthrocentesis: R knee  Date/Time: 2/24/2020 10:17 AM  Consent given by: patient  Site marked: site marked  Timeout: Immediately prior to procedure a time out was called to verify the correct patient, procedure, equipment, support staff and site/side marked as required   Supporting Documentation  Indications: pain   Procedure Details  Location: knee - R knee  Preparation: Patient was prepped and draped in the usual sterile fashion  Needle size: 22 G  Approach: anteromedial  Medications administered: 80 mg triamcinolone acetonide 40 MG/ML; 4 mL lidocaine PF 1% 1 %  Patient tolerance: patient tolerated the procedure well with no immediate complications    Large Joint Arthrocentesis: L knee  Date/Time: 2/24/2020 10:18 AM  Consent given by: patient  Site marked: site marked  Timeout: Immediately prior to procedure a time out was called to verify the correct patient, procedure, equipment, support staff and site/side marked as required   Supporting Documentation  Indications: pain   Procedure Details  Location: knee - L knee  Preparation: Patient was prepped and draped in the usual sterile fashion  Needle size: 22 G  Approach: anteromedial  Medications administered: 80 mg triamcinolone acetonide 40 MG/ML; 4 mL lidocaine PF 1% 1 %  Patient tolerance: patient tolerated the procedure well with no immediate complications                  ASSESSMENT:    Diagnoses and all orders for this visit:    Chronic pain of both knees  -     Large Joint Arthrocentesis: R knee  -     Large Joint Arthrocentesis: L knee    Primary osteoarthritis of both knees  -     Large Joint Arthrocentesis: R knee  -     Large Joint  Arthrocentesis: L knee          PLAN we will inject both knees again with mixture of Xylocaine and Kenalog.  He will ice tonight.  Have gone over the nerve block with him I think is a reasonable to try that.  I written down the name of the lateral geniculate nerve block to discuss with his pain management person.  I told him I have been under general understanding but no experience with it but seems like a reasonable thing to consider as he is not a good operative candidate for multiple reasons  Patient's Body mass index is 52.31 kg/m². BMI is above normal parameters. Recommendations include: exercise counseling and nutrition counseling.    No follow-ups on file.      This document has been electronically signed by Aime Cedillo MD on February 24, 2020 11:05 AM

## 2020-04-24 DIAGNOSIS — E11.9 TYPE 2 DIABETES MELLITUS WITHOUT COMPLICATION, WITHOUT LONG-TERM CURRENT USE OF INSULIN (HCC): ICD-10-CM

## 2020-04-24 RX ORDER — METFORMIN HYDROCHLORIDE 500 MG/1
TABLET, EXTENDED RELEASE ORAL
Qty: 83 TABLET | Refills: 0 | OUTPATIENT
Start: 2020-04-24

## 2020-05-15 ENCOUNTER — OFFICE VISIT (OUTPATIENT)
Dept: ORTHOPEDIC SURGERY | Facility: CLINIC | Age: 60
End: 2020-05-15

## 2020-05-15 VITALS — WEIGHT: 315 LBS | BODY MASS INDEX: 49.44 KG/M2 | HEIGHT: 67 IN

## 2020-05-15 DIAGNOSIS — M25.561 CHRONIC PAIN OF BOTH KNEES: Primary | ICD-10-CM

## 2020-05-15 DIAGNOSIS — G89.29 CHRONIC PAIN OF BOTH KNEES: Primary | ICD-10-CM

## 2020-05-15 DIAGNOSIS — M25.562 CHRONIC PAIN OF BOTH KNEES: Primary | ICD-10-CM

## 2020-05-15 DIAGNOSIS — M17.0 PRIMARY OSTEOARTHRITIS OF BOTH KNEES: ICD-10-CM

## 2020-05-15 PROCEDURE — 20610 DRAIN/INJ JOINT/BURSA W/O US: CPT | Performed by: ORTHOPAEDIC SURGERY

## 2020-05-15 PROCEDURE — 99213 OFFICE O/P EST LOW 20 MIN: CPT | Performed by: ORTHOPAEDIC SURGERY

## 2020-05-15 RX ORDER — LIDOCAINE HYDROCHLORIDE 10 MG/ML
4 INJECTION, SOLUTION EPIDURAL; INFILTRATION; INTRACAUDAL; PERINEURAL
Status: COMPLETED | OUTPATIENT
Start: 2020-05-15 | End: 2020-05-15

## 2020-05-15 RX ORDER — TRIAMCINOLONE ACETONIDE 40 MG/ML
80 INJECTION, SUSPENSION INTRA-ARTICULAR; INTRAMUSCULAR
Status: COMPLETED | OUTPATIENT
Start: 2020-05-15 | End: 2020-05-15

## 2020-05-15 RX ADMIN — LIDOCAINE HYDROCHLORIDE 4 ML: 10 INJECTION, SOLUTION EPIDURAL; INFILTRATION; INTRACAUDAL; PERINEURAL at 08:16

## 2020-05-15 RX ADMIN — TRIAMCINOLONE ACETONIDE 80 MG: 40 INJECTION, SUSPENSION INTRA-ARTICULAR; INTRAMUSCULAR at 08:16

## 2020-05-24 DIAGNOSIS — E11.9 TYPE 2 DIABETES MELLITUS WITHOUT COMPLICATION, WITHOUT LONG-TERM CURRENT USE OF INSULIN (HCC): ICD-10-CM

## 2020-05-26 RX ORDER — METFORMIN HYDROCHLORIDE 500 MG/1
TABLET, EXTENDED RELEASE ORAL
Qty: 83 TABLET | Refills: 0 | Status: SHIPPED | OUTPATIENT
Start: 2020-05-26 | End: 2020-09-03

## 2020-08-06 DIAGNOSIS — I26.99 RECURRENT PULMONARY EMBOLI (HCC): ICD-10-CM

## 2020-08-06 RX ORDER — APIXABAN 5 MG/1
TABLET, FILM COATED ORAL
Qty: 12 TABLET | Refills: 1 | OUTPATIENT
Start: 2020-08-06

## 2020-08-15 DIAGNOSIS — E11.9 TYPE 2 DIABETES MELLITUS WITHOUT COMPLICATION, WITHOUT LONG-TERM CURRENT USE OF INSULIN (HCC): ICD-10-CM

## 2020-08-17 RX ORDER — METFORMIN HYDROCHLORIDE 500 MG/1
TABLET, EXTENDED RELEASE ORAL
Qty: 83 TABLET | Refills: 0 | OUTPATIENT
Start: 2020-08-17

## 2020-08-18 DIAGNOSIS — M25.561 CHRONIC PAIN OF BOTH KNEES: Primary | ICD-10-CM

## 2020-08-18 DIAGNOSIS — G89.29 CHRONIC PAIN OF BOTH KNEES: Primary | ICD-10-CM

## 2020-08-18 DIAGNOSIS — M25.562 CHRONIC PAIN OF BOTH KNEES: Primary | ICD-10-CM

## 2020-08-19 ENCOUNTER — OFFICE VISIT (OUTPATIENT)
Dept: ORTHOPEDIC SURGERY | Facility: CLINIC | Age: 60
End: 2020-08-19

## 2020-08-19 VITALS — HEIGHT: 67 IN | WEIGHT: 315 LBS | BODY MASS INDEX: 49.44 KG/M2

## 2020-08-19 DIAGNOSIS — M25.562 CHRONIC PAIN OF BOTH KNEES: Primary | ICD-10-CM

## 2020-08-19 DIAGNOSIS — M25.561 CHRONIC PAIN OF BOTH KNEES: Primary | ICD-10-CM

## 2020-08-19 DIAGNOSIS — M17.0 PRIMARY OSTEOARTHRITIS OF BOTH KNEES: ICD-10-CM

## 2020-08-19 DIAGNOSIS — G89.29 CHRONIC PAIN OF BOTH KNEES: Primary | ICD-10-CM

## 2020-08-19 PROCEDURE — 20610 DRAIN/INJ JOINT/BURSA W/O US: CPT | Performed by: ORTHOPAEDIC SURGERY

## 2020-08-19 PROCEDURE — 99213 OFFICE O/P EST LOW 20 MIN: CPT | Performed by: ORTHOPAEDIC SURGERY

## 2020-08-19 RX ORDER — TRIAMCINOLONE ACETONIDE 40 MG/ML
80 INJECTION, SUSPENSION INTRA-ARTICULAR; INTRAMUSCULAR
Status: COMPLETED | OUTPATIENT
Start: 2020-08-19 | End: 2020-08-19

## 2020-08-19 RX ADMIN — TRIAMCINOLONE ACETONIDE 80 MG: 40 INJECTION, SUSPENSION INTRA-ARTICULAR; INTRAMUSCULAR at 09:28

## 2020-08-19 RX ADMIN — TRIAMCINOLONE ACETONIDE 80 MG: 40 INJECTION, SUSPENSION INTRA-ARTICULAR; INTRAMUSCULAR at 09:32

## 2020-08-19 NOTE — PROGRESS NOTES
Uriah Willingham is a 59 y.o. male returns for     Chief Complaint   Patient presents with   • Right Knee - Follow-up   • Left Knee - Follow-up     Xray done today.    HISTORY OF PRESENT ILLNESS: Follow up on bilateral knees. Pain level of 7/10.  Is pleased with his last injections knees are still hurting he is still about the same.  BMI is elevated at close to 54.       CONCURRENT MEDICAL HISTORY:    Past Medical History:   Diagnosis Date   • Ankle swelling    • Arthritis    • Back pain    • Diabetes mellitus (CMS/HCC)    • GERD (gastroesophageal reflux disease)    • Heartburn    • History of blood clots    • Hypertension    • Joint pain    • Knee pain    • Muscle pain    • Obesity    • Sleep apnea     c pap       No Known Allergies      Current Outpatient Medications:   •  acetaminophen (TYLENOL) 650 MG 8 hr tablet, Take 650 mg by mouth Every 8 (Eight) Hours As Needed for Mild Pain ., Disp: , Rfl:   •  aspirin 81 MG EC tablet, Take 81 mg by mouth Daily., Disp: , Rfl:   •  ELIQUIS 5 MG tablet tablet, TAKE ONE TABLET BY MOUTH EVERY 12 HOURS, Disp: 180 tablet, Rfl: 2  •  Glucose Blood (FREESTYLE LITE TEST VI), , Disp: , Rfl: 3  •  glucose blood test strip, Qd and prn, Disp: 100 each, Rfl: 4  •  glucose monitor monitoring kit, 1 each Daily. And prn, Disp: 1 each, Rfl: 5  •  Hydrocodone-Acetaminophen (XODOL) 7.5-300 MG per tablet, Take 1 tablet by mouth 2 (Two) Times a Day., Disp: , Rfl:   •  Lancets (FREESTYLE) lancets, Qd and prn, Disp: 100 each, Rfl: 4  •  LISINOPRIL PO, Take 10 mg by mouth 2 (Two) Times a Day., Disp: , Rfl:   •  magnesium oxide (MAGOX) 400 (241.3 MG) MG tablet tablet, Take 400 mg by mouth 2 (Two) Times a Day., Disp: , Rfl:   •  Menthol (BIOFREEZE) 10 % aerosol, Apply  topically to the appropriate area as directed., Disp: , Rfl:   •  metFORMIN ER (GLUCOPHAGE-XR) 500 MG 24 hr tablet, TAKE ONE TABLET BY MOUTH DAILY WITH BREAKFAST, Disp: 83 tablet, Rfl: 0  •  metoprolol succinate XL (TOPROL-XL) 25 MG  "24 hr tablet, Take 25 mg by mouth 2 (Two) Times a Day., Disp: , Rfl:   •  spironolactone (ALDACTONE) 25 MG tablet, Take 25 mg by mouth Daily., Disp: , Rfl:   •  triamcinolone (KENALOG) 0.5 % cream, Apply  topically to the appropriate area as directed 3 (Three) Times a Day. To legs x 2 wks then prn, Disp: 120 g, Rfl: 3    Past Surgical History:   Procedure Laterality Date   • APPENDECTOMY      lap   • CARDIAC CATHETERIZATION N/A 10/8/2018    Procedure: Left Heart Cath with Right Radial Approach;  Surgeon: Artis Garber MD;  Location: [facility] CATH INVASIVE LOCATION;  Service: Cardiology   • COLONOSCOPY  07/22/2014    Diverticulosis in sigmoid colon. Normal cecum & rectum.   • INJECTION OF MEDICATION  11/28/2016    Kenalog (2)      • INJECTION OF MEDICATION  07/26/2013    Rocephin (1)      • INJECTION OF MEDICATION  11/28/2016    Toradol (2)      • TONSILLECTOMY     • WISDOM TOOTH EXTRACTION             ROS: Review of systems has been updated as of today's date.  All other systems are negative except as noted previously.    PHYSICAL EXAMINATION:       Ht 170.2 cm (67\")   Wt (!) 155 kg (342 lb 11.2 oz)   BMI 53.67 kg/m²     Physical Exam   Constitutional: He is oriented to person, place, and time. He appears well-developed.   HENT:   Head: Normocephalic and atraumatic.   Eyes: Pupils are equal, round, and reactive to light. EOM are normal.   Neck: Neck supple. No tracheal deviation present.   Pulmonary/Chest: Effort normal.   Musculoskeletal: He exhibits edema and tenderness. He exhibits no deformity.   Neurological: He is alert and oriented to person, place, and time.   Skin: Skin is warm and dry. No erythema.   Psychiatric: He has a normal mood and affect.       GAIT:     []  Normal  []  Antalgic    Assistive device: [x]  None  []  Walker     []  Crutches  []  Cane     []  Wheelchair  []  Stretcher    Ortho Exam  Venous stasis changes as before.  Varus deformity.  Grossly neurovascular intact with good motion.  " Tender medial joint line.  Patellofemoral crepitus is present.    No results found.    Large Joint Arthrocentesis: R knee  Date/Time: 8/19/2020 9:28 AM  Consent given by: patient  Site marked: site marked  Timeout: Immediately prior to procedure a time out was called to verify the correct patient, procedure, equipment, support staff and site/side marked as required   Supporting Documentation  Indications: pain   Procedure Details  Location: knee - R knee  Preparation: Patient was prepped and draped in the usual sterile fashion  Needle size: 22 G  Approach: anteromedial  Medications administered: 80 mg triamcinolone acetonide 40 MG/ML (4 cc lidocaine ndc [id number] lot #[id number])  Patient tolerance: patient tolerated the procedure well with no immediate complications    Large Joint Arthrocentesis: L knee  Date/Time: 8/19/2020 9:32 AM  Consent given by: patient  Site marked: site marked  Timeout: Immediately prior to procedure a time out was called to verify the correct patient, procedure, equipment, support staff and site/side marked as required   Supporting Documentation  Indications: pain   Procedure Details  Location: knee - L knee  Preparation: Patient was prepped and draped in the usual sterile fashion  Needle size: 22 G  Approach: anteromedial  Medications administered: 80 mg triamcinolone acetonide 40 MG/ML (4 cc lidocaine ndc [id number] lot #[id number])  Patient tolerance: patient tolerated the procedure well with no immediate complications        Knee x-ray shows severe DJD    ASSESSMENT:    Diagnoses and all orders for this visit:    Chronic pain of both knees  -     Large Joint Arthrocentesis: R knee  -     Large Joint Arthrocentesis: L knee    Primary osteoarthritis of both knees  -     Large Joint Arthrocentesis: R knee  -     Large Joint Arthrocentesis: L knee          PLAN we will inject each knee again today as before with Kenalog.  He will ice them tonight we will see him back as needed.  Patient's  Body mass index is 53.67 kg/m². BMI is above normal parameters. Recommendations include: exercise counseling and nutrition counseling.    No follow-ups on file.      This document has been electronically signed by Amie Cedillo MD on August 19, 2020 16:17

## 2020-09-03 ENCOUNTER — OFFICE VISIT (OUTPATIENT)
Dept: FAMILY MEDICINE CLINIC | Facility: CLINIC | Age: 60
End: 2020-09-03

## 2020-09-03 ENCOUNTER — LAB (OUTPATIENT)
Dept: LAB | Facility: HOSPITAL | Age: 60
End: 2020-09-03

## 2020-09-03 VITALS
BODY MASS INDEX: 49.44 KG/M2 | HEIGHT: 67 IN | WEIGHT: 315 LBS | DIASTOLIC BLOOD PRESSURE: 88 MMHG | SYSTOLIC BLOOD PRESSURE: 138 MMHG

## 2020-09-03 DIAGNOSIS — I87.2 VENOUS INSUFFICIENCY: Chronic | ICD-10-CM

## 2020-09-03 DIAGNOSIS — E11.9 TYPE 2 DIABETES MELLITUS WITHOUT COMPLICATION, WITHOUT LONG-TERM CURRENT USE OF INSULIN (HCC): Primary | Chronic | ICD-10-CM

## 2020-09-03 DIAGNOSIS — Z99.89 OSA ON CPAP: Chronic | ICD-10-CM

## 2020-09-03 DIAGNOSIS — Z79.01 CHRONIC ANTICOAGULATION: ICD-10-CM

## 2020-09-03 DIAGNOSIS — I10 ESSENTIAL HYPERTENSION, BENIGN: Chronic | ICD-10-CM

## 2020-09-03 DIAGNOSIS — G47.33 OSA ON CPAP: Chronic | ICD-10-CM

## 2020-09-03 DIAGNOSIS — Z12.5 SCREENING FOR MALIGNANT NEOPLASM OF PROSTATE: ICD-10-CM

## 2020-09-03 DIAGNOSIS — I26.99 RECURRENT PULMONARY EMBOLI (HCC): Chronic | ICD-10-CM

## 2020-09-03 PROBLEM — G89.29 CHRONIC PAIN OF BOTH KNEES: Chronic | Status: RESOLVED | Noted: 2018-03-30 | Resolved: 2020-09-03

## 2020-09-03 PROBLEM — M17.0 PRIMARY OSTEOARTHRITIS OF BOTH KNEES: Chronic | Status: ACTIVE | Noted: 2019-01-10

## 2020-09-03 PROBLEM — M25.562 CHRONIC PAIN OF BOTH KNEES: Chronic | Status: RESOLVED | Noted: 2018-03-30 | Resolved: 2020-09-03

## 2020-09-03 PROBLEM — M25.562 LEFT KNEE PAIN: Chronic | Status: RESOLVED | Noted: 2018-04-04 | Resolved: 2020-09-03

## 2020-09-03 PROBLEM — M25.561 CHRONIC PAIN OF BOTH KNEES: Chronic | Status: RESOLVED | Noted: 2018-03-30 | Resolved: 2020-09-03

## 2020-09-03 LAB
ALBUMIN SERPL-MCNC: 4.3 G/DL (ref 3.5–5.2)
ALBUMIN/GLOB SERPL: 1.3 G/DL
ALP SERPL-CCNC: 53 U/L (ref 39–117)
ALT SERPL W P-5'-P-CCNC: 25 U/L (ref 1–41)
ANION GAP SERPL CALCULATED.3IONS-SCNC: 12.4 MMOL/L (ref 5–15)
AST SERPL-CCNC: 18 U/L (ref 1–40)
BILIRUB SERPL-MCNC: 1.6 MG/DL (ref 0–1.2)
BUN SERPL-MCNC: 14 MG/DL (ref 6–20)
BUN/CREAT SERPL: 16.5 (ref 7–25)
CALCIUM SPEC-SCNC: 10.6 MG/DL (ref 8.6–10.5)
CHLORIDE SERPL-SCNC: 105 MMOL/L (ref 98–107)
CHOLEST SERPL-MCNC: 185 MG/DL (ref 0–200)
CO2 SERPL-SCNC: 22.6 MMOL/L (ref 22–29)
CREAT SERPL-MCNC: 0.85 MG/DL (ref 0.76–1.27)
GFR SERPL CREATININE-BSD FRML MDRD: 93 ML/MIN/1.73
GLOBULIN UR ELPH-MCNC: 3.2 GM/DL
GLUCOSE SERPL-MCNC: 97 MG/DL (ref 65–99)
HBA1C MFR BLD: 5.5 % (ref 4.8–5.6)
HCT VFR BLD AUTO: 49.1 % (ref 37.5–51)
HDLC SERPL-MCNC: 62 MG/DL (ref 40–60)
HGB BLD-MCNC: 16.4 G/DL (ref 13–17.7)
LDLC SERPL CALC-MCNC: 100 MG/DL (ref 0–100)
LDLC/HDLC SERPL: 1.61 {RATIO}
MAGNESIUM SERPL-MCNC: 2.1 MG/DL (ref 1.6–2.6)
POTASSIUM SERPL-SCNC: 4.3 MMOL/L (ref 3.5–5.2)
PROT SERPL-MCNC: 7.5 G/DL (ref 6–8.5)
PSA SERPL-MCNC: 0.75 NG/ML (ref 0–4)
SODIUM SERPL-SCNC: 140 MMOL/L (ref 136–145)
TRIGL SERPL-MCNC: 117 MG/DL (ref 0–150)
VLDLC SERPL-MCNC: 23.4 MG/DL (ref 5–40)

## 2020-09-03 PROCEDURE — 83036 HEMOGLOBIN GLYCOSYLATED A1C: CPT | Performed by: FAMILY MEDICINE

## 2020-09-03 PROCEDURE — 85018 HEMOGLOBIN: CPT | Performed by: FAMILY MEDICINE

## 2020-09-03 PROCEDURE — 85014 HEMATOCRIT: CPT | Performed by: FAMILY MEDICINE

## 2020-09-03 PROCEDURE — 80053 COMPREHEN METABOLIC PANEL: CPT | Performed by: FAMILY MEDICINE

## 2020-09-03 PROCEDURE — 36415 COLL VENOUS BLD VENIPUNCTURE: CPT | Performed by: FAMILY MEDICINE

## 2020-09-03 PROCEDURE — 99214 OFFICE O/P EST MOD 30 MIN: CPT | Performed by: FAMILY MEDICINE

## 2020-09-03 PROCEDURE — 82043 UR ALBUMIN QUANTITATIVE: CPT | Performed by: FAMILY MEDICINE

## 2020-09-03 PROCEDURE — 80061 LIPID PANEL: CPT | Performed by: FAMILY MEDICINE

## 2020-09-03 PROCEDURE — G0103 PSA SCREENING: HCPCS | Performed by: FAMILY MEDICINE

## 2020-09-03 PROCEDURE — 83735 ASSAY OF MAGNESIUM: CPT | Performed by: FAMILY MEDICINE

## 2020-09-03 NOTE — PROGRESS NOTES
Subjective   Uriah Willingham is a 60 y.o. male.  Just after prolonged absence diagnoses of type 2 diabetes morbid obesity hypertension history of recurrent pulmonary embolism from DVT diagnoses as below.  In the interim states is been relatively active continuing on daily activities continues on blood thinner.  Medicines have remained the same.  Has had an eye exam.  Up-to-date on all other.    History of Present Illness   HPI    The following portions of the patient's history were reviewed and updated as appropriate: allergies, current medications, past family history, past medical history, past social history, past surgical history and problem list.    Review of Systems  Review of Systems   Constitutional: Negative for activity change, appetite change, fatigue and unexpected weight change.   HENT: Negative for trouble swallowing and voice change.    Eyes: Negative for redness and visual disturbance.   Respiratory: Negative for cough and wheezing.    Cardiovascular: Positive for leg swelling (Chronic dependent edema.). Negative for chest pain and palpitations.   Gastrointestinal: Negative for abdominal pain, constipation, diarrhea, nausea and vomiting.   Genitourinary: Negative for urgency.   Musculoskeletal: Negative for joint swelling.   Neurological: Negative for syncope and headaches.   Hematological: Negative for adenopathy.   Psychiatric/Behavioral: Negative for sleep disturbance.       Objective   Physical Exam  Physical Exam   Constitutional: He is oriented to person, place, and time. He appears well-developed.   HENT:   Head: Normocephalic.   Right Ear: External ear normal.   Eyes: Pupils are equal, round, and reactive to light.   Neck: Normal range of motion. No thyromegaly present.   Cardiovascular: Normal rate, regular rhythm, normal heart sounds and intact distal pulses. Exam reveals no gallop and no friction rub.   No murmur heard.  Pulmonary/Chest: Breath sounds normal.   Abdominal: Soft. He  "exhibits no distension and no mass. There is no tenderness.   Musculoskeletal: Normal range of motion.   3+ venous insufficiency edema mild venous changes no skin breakdown however.  Feet are intact   Neurological: He is alert and oriented to person, place, and time. He has normal reflexes.   Skin: Skin is warm and dry.   Psychiatric: He has a normal mood and affect. His speech is normal and behavior is normal.         Visit Vitals  /88   Ht 170.2 cm (67\")   Wt (!) 158 kg (349 lb)   BMI 54.66 kg/m²     Body mass index is 54.66 kg/m².      Assessment/Plan   Uriah was seen today for diabetes.    Diagnoses and all orders for this visit:    Type 2 diabetes mellitus without complication, without long-term current use of insulin (CMS/HCC)  -     Lipid Panel With LDL / HDL Ratio  -     Magnesium  -     MicroAlbumin, Urine, Random - Urine, Clean Catch  -     Hemoglobin A1c  -     Comprehensive Metabolic Panel  -     metFORMIN (GLUCOPHAGE) 500 MG tablet; Take 1 tablet by mouth Daily With Breakfast.    BMI 45.0-49.9, adult (CMS/HCC)    KYLAH on CPAP    Essential hypertension, benign  -     Magnesium  -     Comprehensive Metabolic Panel    Venous insufficiency    Recurrent pulmonary emboli (CMS/HCC)    Screening for malignant neoplasm of prostate  -     PSA Screen    Chronic anticoagulation  -     Hemoglobin & Hematocrit, Blood     on wt loss measures and programs  Counseled mainly on lifestyle measures leg elevation weight loss as we have done before I discussed bariatric procedures in the past.  Not a candidate due to recurrent PE.  Defer shingles vaccine till later we will get a flu shot little bit later in the year or more effective recheck 4 months A1c here  "

## 2020-09-04 LAB — ALBUMIN UR-MCNC: 1.5 MG/DL

## 2020-11-20 ENCOUNTER — OFFICE VISIT (OUTPATIENT)
Dept: ORTHOPEDIC SURGERY | Facility: CLINIC | Age: 60
End: 2020-11-20

## 2020-11-20 VITALS — BODY MASS INDEX: 49.44 KG/M2 | HEIGHT: 67 IN | WEIGHT: 315 LBS

## 2020-11-20 DIAGNOSIS — M17.0 PRIMARY OSTEOARTHRITIS OF BOTH KNEES: ICD-10-CM

## 2020-11-20 DIAGNOSIS — M25.562 CHRONIC PAIN OF BOTH KNEES: Primary | ICD-10-CM

## 2020-11-20 DIAGNOSIS — G89.29 CHRONIC PAIN OF BOTH KNEES: Primary | ICD-10-CM

## 2020-11-20 DIAGNOSIS — M25.561 CHRONIC PAIN OF BOTH KNEES: Primary | ICD-10-CM

## 2020-11-20 PROCEDURE — 99213 OFFICE O/P EST LOW 20 MIN: CPT | Performed by: ORTHOPAEDIC SURGERY

## 2020-11-20 PROCEDURE — 20610 DRAIN/INJ JOINT/BURSA W/O US: CPT | Performed by: ORTHOPAEDIC SURGERY

## 2020-11-20 NOTE — PROGRESS NOTES
Uriah Willingham is a 60 y.o. male returns for     Chief Complaint   Patient presents with   • Left Knee - Follow-up   • Right Knee - Follow-up       HISTORY OF PRESENT ILLNESS: Patient being seen for bilateral knee follow up. Patient requesting injections today. Last injections given 8/19/2020.  Is been right at 3 months the shot helped some for 2 months as before.  We have again discussed lateral geniculate nerve block and ablation which he is in the past but he wants to do the shots at this time which is reasonable.       CONCURRENT MEDICAL HISTORY:    Past Medical History:   Diagnosis Date   • Ankle swelling    • Arthritis    • Back pain    • Diabetes mellitus (CMS/HCC)    • GERD (gastroesophageal reflux disease)    • Heartburn    • History of blood clots    • Hypertension    • Joint pain    • Knee pain    • Muscle pain    • Obesity    • Sleep apnea     c pap       No Known Allergies      Current Outpatient Medications:   •  acetaminophen (TYLENOL) 650 MG 8 hr tablet, Take 650 mg by mouth Every 8 (Eight) Hours As Needed for Mild Pain ., Disp: , Rfl:   •  aspirin 81 MG EC tablet, Take 81 mg by mouth Daily., Disp: , Rfl:   •  ELIQUIS 5 MG tablet tablet, TAKE ONE TABLET BY MOUTH EVERY 12 HOURS, Disp: 180 tablet, Rfl: 2  •  Glucose Blood (FREESTYLE LITE TEST VI), , Disp: , Rfl: 3  •  glucose blood test strip, Qd and prn, Disp: 100 each, Rfl: 4  •  glucose monitor monitoring kit, 1 each Daily. And prn, Disp: 1 each, Rfl: 5  •  Hydrocodone-Acetaminophen (XODOL) 7.5-300 MG per tablet, Take 1 tablet by mouth 2 (Two) Times a Day., Disp: , Rfl:   •  Lancets (FREESTYLE) lancets, Qd and prn, Disp: 100 each, Rfl: 4  •  LISINOPRIL PO, Take 10 mg by mouth 2 (Two) Times a Day., Disp: , Rfl:   •  magnesium oxide (MAGOX) 400 (241.3 MG) MG tablet tablet, Take 400 mg by mouth 2 (Two) Times a Day., Disp: , Rfl:   •  Menthol (BIOFREEZE) 10 % aerosol, Apply  topically to the appropriate area as directed., Disp: , Rfl:   •  metFORMIN  "(GLUCOPHAGE) 500 MG tablet, Take 1 tablet by mouth Daily With Breakfast., Disp: 90 tablet, Rfl: 1  •  metoprolol succinate XL (TOPROL-XL) 25 MG 24 hr tablet, Take 25 mg by mouth 2 (Two) Times a Day., Disp: , Rfl:   •  spironolactone (ALDACTONE) 25 MG tablet, Take 25 mg by mouth Daily., Disp: , Rfl:   •  triamcinolone (KENALOG) 0.5 % cream, Apply  topically to the appropriate area as directed 3 (Three) Times a Day. To legs x 2 wks then prn, Disp: 120 g, Rfl: 3    Past Surgical History:   Procedure Laterality Date   • APPENDECTOMY      lap   • CARDIAC CATHETERIZATION N/A 10/8/2018    Procedure: Left Heart Cath with Right Radial Approach;  Surgeon: Artis Garber MD;  Location: [facility] CATH INVASIVE LOCATION;  Service: Cardiology   • COLONOSCOPY  07/22/2014    Diverticulosis in sigmoid colon. Normal cecum & rectum.   • INJECTION OF MEDICATION  11/28/2016    Kenalog (2)      • INJECTION OF MEDICATION  07/26/2013    Rocephin (1)      • INJECTION OF MEDICATION  11/28/2016    Toradol (2)      • TONSILLECTOMY     • WISDOM TOOTH EXTRACTION             ROS: Review of systems has been updated as of today's date.  All other systems are negative except as noted previously.    PHYSICAL EXAMINATION:       Ht 170.2 cm (67\")   Wt (!) 157 kg (345 lb 9.6 oz)   BMI 54.13 kg/m²     Physical Exam  Constitutional:       Appearance: Normal appearance. He is well-developed.   HENT:      Head: Normocephalic and atraumatic.      Nose: Nose normal. No congestion.   Eyes:      General: No scleral icterus.     Pupils: Pupils are equal, round, and reactive to light.   Neck:      Musculoskeletal: Neck supple.      Trachea: No tracheal deviation.   Pulmonary:      Effort: Pulmonary effort is normal.   Musculoskeletal: Normal range of motion.         General: Swelling and tenderness present. No deformity.      Right lower leg: Edema present.      Left lower leg: Edema present.   Skin:     General: Skin is warm and dry.      Findings: No erythema. "   Neurological:      General: No focal deficit present.      Mental Status: He is alert and oriented to person, place, and time.      Gait: Gait abnormal.   Psychiatric:         Mood and Affect: Mood normal.         GAIT:     [x]  Normal  []  Antalgic    Assistive device: [x]  None  []  Walker     []  Crutches  []  Cane     []  Wheelchair  []  Stretcher    Ortho Exam  The medial joint line motion is unchanged calf is negative venous stasis changes noted neurologically intact.    No results found.    Standing AP bilateral knee without comparison     Markedly severe arthritic changes seen bilaterally worse on the right than the left with complete obliteration of joint space on the right.  No acute findings varus deformity is noted     Impression: Severe DJD bilateral knees, right worse than left      Lateral view bilateral knee without comparison     Marked arthritic changes seen patellofemoral joint and markedly narrowed femoral-tibial joint.  Slightly worse on the right side.  Posterior osteophytes are also present bilaterally     Impression: Severe degenerative changes noted bilateral knees lateral view only      ASSESSMENT:    Diagnoses and all orders for this visit:    Chronic pain of both knees  -     Large Joint Arthrocentesis: R knee  -     Large Joint Arthrocentesis: L knee    Primary osteoarthritis of both knees  -     Large Joint Arthrocentesis: R knee  -     Large Joint Arthrocentesis: L knee      Large Joint Arthrocentesis: R knee  Date/Time: 11/20/2020 1:32 PM  Consent given by: patient  Site marked: site marked  Timeout: Immediately prior to procedure a time out was called to verify the correct patient, procedure, equipment, support staff and site/side marked as required   Supporting Documentation  Indications: pain   Procedure Details  Location: knee - R knee  Preparation: Patient was prepped and draped in the usual sterile fashion  Needle size: 22 G  Approach: anteromedial  Medications administered: 4  mL lidocaine 1 %, 2 mL triamcinolone acetonide 40 MG/ML  Patient tolerance: patient tolerated the procedure well with no immediate complications    Large Joint Arthrocentesis: L knee  Date/Time: 11/20/2020 1:32 PM  Consent given by: patient  Site marked: site marked  Timeout: Immediately prior to procedure a time out was called to verify the correct patient, procedure, equipment, support staff and site/side marked as required   Supporting Documentation  Indications: pain   Procedure Details  Location: knee - L knee  Preparation: Patient was prepped and draped in the usual sterile fashion  Needle size: 22 G  Approach: anteromedial  Medications administered: 4 mL lidocaine 1 %, 2 mL triamcinolone acetonide 40 MG/ML  Patient tolerance: patient tolerated the procedure well with no immediate complications            PLAN we will get inject each knee with mixture of Xylocaine and Kenalog as above.  He will ice them tonight we will see him back as needed.  Also discussed possibly getting a sample of viscosupplementation to inject the more symptomatic knee.  We will see we can get this done apparently his insurance company would not pay for this.  It might be a good solution to the problem    Patient's Body mass index is 54.13 kg/m². BMI is above normal parameters. Recommendations include: exercise counseling and nutrition counseling.      Return if symptoms worsen or fail to improve.      This document has been electronically signed by Aime Cedillo MD on November 20, 2020 14:26 CST

## 2020-12-11 DIAGNOSIS — I26.99 RECURRENT PULMONARY EMBOLI (HCC): ICD-10-CM

## 2020-12-11 RX ORDER — APIXABAN 5 MG/1
TABLET, FILM COATED ORAL
Qty: 180 TABLET | Refills: 1 | Status: SHIPPED | OUTPATIENT
Start: 2020-12-11 | End: 2021-04-22

## 2021-01-04 ENCOUNTER — OFFICE VISIT (OUTPATIENT)
Dept: FAMILY MEDICINE CLINIC | Facility: CLINIC | Age: 61
End: 2021-01-04

## 2021-01-04 VITALS
WEIGHT: 315 LBS | SYSTOLIC BLOOD PRESSURE: 132 MMHG | DIASTOLIC BLOOD PRESSURE: 82 MMHG | BODY MASS INDEX: 49.44 KG/M2 | HEIGHT: 67 IN

## 2021-01-04 DIAGNOSIS — E11.9 TYPE 2 DIABETES MELLITUS WITHOUT COMPLICATION, WITHOUT LONG-TERM CURRENT USE OF INSULIN (HCC): Primary | Chronic | ICD-10-CM

## 2021-01-04 DIAGNOSIS — Z79.01 CHRONIC ANTICOAGULATION: Chronic | ICD-10-CM

## 2021-01-04 DIAGNOSIS — I10 ESSENTIAL HYPERTENSION, BENIGN: Chronic | ICD-10-CM

## 2021-01-04 DIAGNOSIS — I26.99 RECURRENT PULMONARY EMBOLI (HCC): Chronic | ICD-10-CM

## 2021-01-04 DIAGNOSIS — E66.01 MORBID (SEVERE) OBESITY DUE TO EXCESS CALORIES (HCC): Chronic | ICD-10-CM

## 2021-01-04 LAB — HBA1C MFR BLD: 5.4 %

## 2021-01-04 PROCEDURE — 99214 OFFICE O/P EST MOD 30 MIN: CPT | Performed by: FAMILY MEDICINE

## 2021-01-04 PROCEDURE — 83036 HEMOGLOBIN GLYCOSYLATED A1C: CPT | Performed by: FAMILY MEDICINE

## 2021-01-04 NOTE — PROGRESS NOTES
Subjective   Uriah Willingham is a 60 y.o. male.  Relation morbid obesity type 2 diabetes hypertension recurrent pulmonary embolism history noted below.  Interim is maintain blood pressure and weight.  Not gained or lost much.  A1c stable 5.5.  Is on anticoagulation.  Arthritic complaints are about the same.  Declines vaccine.    History of Present Illness   HPI    The following portions of the patient's history were reviewed and updated as appropriate: allergies, current medications, past family history, past medical history, past social history, past surgical history and problem list.    Review of Systems  Review of Systems   Constitutional: Negative for activity change, appetite change, fatigue and unexpected weight change.   HENT: Negative for trouble swallowing and voice change.    Eyes: Negative for redness and visual disturbance.   Respiratory: Negative for cough and wheezing.    Cardiovascular: Positive for leg swelling (Chronic). Negative for chest pain and palpitations.   Gastrointestinal: Negative for abdominal pain, constipation, diarrhea, nausea and vomiting.   Genitourinary: Negative for urgency.   Musculoskeletal: Positive for arthralgias and back pain. Negative for joint swelling.   Neurological: Negative for syncope and headaches.   Hematological: Negative for adenopathy.   Psychiatric/Behavioral: Negative for sleep disturbance.       Objective   Physical Exam  Physical Exam  Constitutional:       Appearance: He is well-developed.   HENT:      Head: Normocephalic.   Eyes:      Pupils: Pupils are equal, round, and reactive to light.   Neck:      Musculoskeletal: Normal range of motion.      Thyroid: No thyromegaly.   Cardiovascular:      Rate and Rhythm: Normal rate and regular rhythm.      Heart sounds: Normal heart sounds. No murmur. No friction rub. No gallop.    Pulmonary:      Breath sounds: Normal breath sounds.   Abdominal:      General: There is no distension.      Palpations: Abdomen is  "soft. There is no mass.      Tenderness: There is no abdominal tenderness.   Musculoskeletal: Normal range of motion.      Comments: Chronic venous changes no real change no skin breakdown   Skin:     General: Skin is warm and dry.   Neurological:      Mental Status: He is alert and oriented to person, place, and time.      Deep Tendon Reflexes: Reflexes are normal and symmetric.   Psychiatric:         Attention and Perception: Attention normal.         Mood and Affect: Mood normal.         Speech: Speech normal.       Results for orders placed or performed in visit on 01/04/21   POC Glycosylated Hemoglobin (Hb A1C)    Specimen: Blood   Result Value Ref Range    Hemoglobin A1C 5.4 %           Visit Vitals  /82   Ht 170.2 cm (67\")   Wt (!) 156 kg (344 lb 12.8 oz)   BMI 54.00 kg/m²     Body mass index is 54 kg/m².      Assessment/Plan   Diagnoses and all orders for this visit:    1. Type 2 diabetes mellitus without complication, without long-term current use of insulin (CMS/HCC) (Primary)  -     POC Glycosylated Hemoglobin (Hb A1C)    2. Morbid (severe) obesity due to excess calories (CMS/HCC)    3. Essential hypertension, benign    4. Recurrent pulmonary emboli (CMS/HCC)    5. Chronic anticoagulation     on wt loss measures and programs  Counseling on lifestyle measures diet exercise infection prevention bleeding prevention candidate for Covid vaccine second tier.  Recheck 4 months A1c here  "

## 2021-01-13 DIAGNOSIS — E11.9 TYPE 2 DIABETES MELLITUS WITHOUT COMPLICATION, WITHOUT LONG-TERM CURRENT USE OF INSULIN (HCC): Chronic | ICD-10-CM

## 2021-02-17 ENCOUNTER — OFFICE VISIT (OUTPATIENT)
Dept: ORTHOPEDIC SURGERY | Facility: CLINIC | Age: 61
End: 2021-02-17

## 2021-02-17 VITALS — BODY MASS INDEX: 47.74 KG/M2 | WEIGHT: 315 LBS | HEIGHT: 68 IN

## 2021-02-17 DIAGNOSIS — M17.0 PRIMARY OSTEOARTHRITIS OF BOTH KNEES: ICD-10-CM

## 2021-02-17 DIAGNOSIS — M25.561 CHRONIC PAIN OF BOTH KNEES: Primary | ICD-10-CM

## 2021-02-17 DIAGNOSIS — G89.29 CHRONIC PAIN OF BOTH KNEES: Primary | ICD-10-CM

## 2021-02-17 DIAGNOSIS — M25.562 CHRONIC PAIN OF BOTH KNEES: Primary | ICD-10-CM

## 2021-02-17 PROCEDURE — 99213 OFFICE O/P EST LOW 20 MIN: CPT | Performed by: ORTHOPAEDIC SURGERY

## 2021-02-17 PROCEDURE — 20610 DRAIN/INJ JOINT/BURSA W/O US: CPT | Performed by: ORTHOPAEDIC SURGERY

## 2021-02-17 RX ORDER — LIDOCAINE HYDROCHLORIDE 10 MG/ML
4 INJECTION, SOLUTION INFILTRATION; PERINEURAL
Status: COMPLETED | OUTPATIENT
Start: 2021-02-17 | End: 2021-02-17

## 2021-02-17 RX ORDER — TRIAMCINOLONE ACETONIDE 40 MG/ML
80 INJECTION, SUSPENSION INTRA-ARTICULAR; INTRAMUSCULAR
Status: COMPLETED | OUTPATIENT
Start: 2021-02-17 | End: 2021-02-17

## 2021-02-17 RX ADMIN — TRIAMCINOLONE ACETONIDE 80 MG: 40 INJECTION, SUSPENSION INTRA-ARTICULAR; INTRAMUSCULAR at 09:18

## 2021-02-17 RX ADMIN — LIDOCAINE HYDROCHLORIDE 4 ML: 10 INJECTION, SOLUTION INFILTRATION; PERINEURAL at 09:18

## 2021-02-17 NOTE — PROGRESS NOTES
"Uriah Willingham is a 60 y.o. male returns for     Chief Complaint   Patient presents with   • Left Knee - Follow-up   • Right Knee - Follow-up       HISTORY OF PRESENT ILLNESS: Bilateral knee pain follow up.  Both knees were last injected on 11/20/2020. He states the injections helped.  Did a little better with these injections any done on previously last him almost up to his time.       CONCURRENT MEDICAL HISTORY:    The following portions of the patient's history were reviewed and updated as appropriate: allergies, current medications, past family history, past medical history, past social history, past surgical history and problem list.     ROS  No fevers or chills.  No chest pain or shortness of air.  No GI or  disturbances.    PHYSICAL EXAMINATION:       Ht 172.7 cm (68\")   Wt (!) 163 kg (360 lb)   BMI 54.74 kg/m²     Physical Exam  Constitutional:       Appearance: Normal appearance. He is well-developed.   HENT:      Head: Normocephalic and atraumatic.      Nose: Nose normal. No congestion.   Eyes:      General: No scleral icterus.     Pupils: Pupils are equal, round, and reactive to light.   Neck:      Musculoskeletal: Neck supple.      Trachea: No tracheal deviation.   Pulmonary:      Effort: Pulmonary effort is normal.   Musculoskeletal:         General: Swelling and tenderness present. No deformity.      Right lower leg: Edema present.      Left lower leg: Edema present.   Skin:     General: Skin is warm and dry.      Findings: No erythema.   Neurological:      General: No focal deficit present.      Mental Status: He is alert and oriented to person, place, and time.   Psychiatric:         Mood and Affect: Mood normal.         GAIT:     [x]  Normal  []  Antalgic    Assistive device: [x]  None  []  Walker     []  Crutches  []  Cane     []  Wheelchair  []  Stretcher    Ortho Exam  Significant brawny edematous changes and swelling in both legs more so than usual medial joint space narrowing.  Does " have full extension.  Neurovascular intact  No results found.    Study Result    Standing AP bilateral knee without comparison     Markedly severe arthritic changes seen bilaterally worse on the right than the left with complete obliteration of joint space on the right.  No acute findings varus deformity is noted     Impression: Severe DJD bilateral knees, right worse than left       Study Result    Lateral view bilateral knee without comparison     Marked arthritic changes seen patellofemoral joint and markedly narrowed femoral-tibial joint.  Slightly worse on the right side.  Posterior osteophytes are also present bilaterally     Impression: Severe degenerative changes noted bilateral knees lateral view only     Large Joint Arthrocentesis: R knee  Date/Time: 2/17/2021 9:18 AM  Consent given by: patient  Site marked: site marked  Timeout: Immediately prior to procedure a time out was called to verify the correct patient, procedure, equipment, support staff and site/side marked as required   Supporting Documentation  Indications: pain   Procedure Details  Location: knee - R knee  Preparation: Patient was prepped and draped in the usual sterile fashion  Needle size: 22 G  Approach: anteromedial  Medications administered: 4 mL lidocaine 1 %; 80 mg triamcinolone acetonide 40 MG/ML  Patient tolerance: patient tolerated the procedure well with no immediate complications    Large Joint Arthrocentesis: L knee  Date/Time: 2/17/2021 9:18 AM  Consent given by: patient  Site marked: site marked  Timeout: Immediately prior to procedure a time out was called to verify the correct patient, procedure, equipment, support staff and site/side marked as required   Supporting Documentation  Indications: pain   Procedure Details  Location: knee - L knee  Preparation: Patient was prepped and draped in the usual sterile fashion  Needle size: 22 G  Approach: anteromedial  Medications administered: 80 mg triamcinolone acetonide 40 MG/ML; 4  mL lidocaine 1 %  Patient tolerance: patient tolerated the procedure well with no immediate complications          ASSESSMENT:    Diagnoses and all orders for this visit:    Chronic pain of both knees  -     Large Joint Arthrocentesis: R knee  -     Large Joint Arthrocentesis: L knee    Primary osteoarthritis of both knees  -     Large Joint Arthrocentesis: R knee  -     Large Joint Arthrocentesis: L knee          PLAN we will inject each knee again today with next of Xylocaine and Kenalog.  Certainly not very good candidate for knee replacement.  I also discussed with him I briefly platelet rich plasma injections, discussed the stem cell injections with orthopedic standpoint.  We have also discussed lateral geniculate nerve ablation which he has had a trial and did pretty well before he can to keep these as options at this point he seems to be doing pretty well with an injection every 3 months but clearly understands that this likely will not last forever.  We will follow up as needed and get see either one of the practitioners or their doctors.    Patient's Body mass index is 54.74 kg/m². BMI is above normal parameters. Recommendations include: exercise counseling and nutrition counseling.  No follow-ups on file.    Aime Cedillo MD

## 2021-04-22 DIAGNOSIS — I26.99 RECURRENT PULMONARY EMBOLI (HCC): ICD-10-CM

## 2021-04-22 RX ORDER — APIXABAN 5 MG/1
TABLET, FILM COATED ORAL
Qty: 120 TABLET | Refills: 1 | Status: SHIPPED | OUTPATIENT
Start: 2021-04-22 | End: 2021-07-07

## 2021-05-11 ENCOUNTER — OFFICE VISIT (OUTPATIENT)
Dept: FAMILY MEDICINE CLINIC | Facility: CLINIC | Age: 61
End: 2021-05-11

## 2021-05-11 ENCOUNTER — OFFICE VISIT (OUTPATIENT)
Dept: ORTHOPEDIC SURGERY | Facility: CLINIC | Age: 61
End: 2021-05-11

## 2021-05-11 VITALS
SYSTOLIC BLOOD PRESSURE: 132 MMHG | DIASTOLIC BLOOD PRESSURE: 80 MMHG | WEIGHT: 315 LBS | BODY MASS INDEX: 47.74 KG/M2 | HEIGHT: 68 IN

## 2021-05-11 VITALS — BODY MASS INDEX: 47.74 KG/M2 | WEIGHT: 315 LBS | HEIGHT: 68 IN

## 2021-05-11 DIAGNOSIS — E66.01 MORBID (SEVERE) OBESITY DUE TO EXCESS CALORIES (HCC): Chronic | ICD-10-CM

## 2021-05-11 DIAGNOSIS — M25.562 CHRONIC PAIN OF BOTH KNEES: ICD-10-CM

## 2021-05-11 DIAGNOSIS — I10 ESSENTIAL HYPERTENSION, BENIGN: Chronic | ICD-10-CM

## 2021-05-11 DIAGNOSIS — M25.561 CHRONIC PAIN OF BOTH KNEES: ICD-10-CM

## 2021-05-11 DIAGNOSIS — E11.9 TYPE 2 DIABETES MELLITUS WITHOUT COMPLICATION, WITHOUT LONG-TERM CURRENT USE OF INSULIN (HCC): Primary | Chronic | ICD-10-CM

## 2021-05-11 DIAGNOSIS — M17.0 PRIMARY OSTEOARTHRITIS OF BOTH KNEES: Primary | Chronic | ICD-10-CM

## 2021-05-11 DIAGNOSIS — Z12.5 SCREENING FOR MALIGNANT NEOPLASM OF PROSTATE: ICD-10-CM

## 2021-05-11 DIAGNOSIS — Z79.01 CHRONIC ANTICOAGULATION: Chronic | ICD-10-CM

## 2021-05-11 DIAGNOSIS — G89.29 CHRONIC PAIN OF BOTH KNEES: ICD-10-CM

## 2021-05-11 PROBLEM — F11.90 CHRONIC NARCOTIC USE: Status: ACTIVE | Noted: 2021-05-11

## 2021-05-11 PROBLEM — F11.90 CHRONIC NARCOTIC USE: Chronic | Status: ACTIVE | Noted: 2021-05-11

## 2021-05-11 LAB — HBA1C MFR BLD: 5.6 %

## 2021-05-11 PROCEDURE — 83036 HEMOGLOBIN GLYCOSYLATED A1C: CPT | Performed by: FAMILY MEDICINE

## 2021-05-11 PROCEDURE — 20610 DRAIN/INJ JOINT/BURSA W/O US: CPT | Performed by: NURSE PRACTITIONER

## 2021-05-11 PROCEDURE — 99213 OFFICE O/P EST LOW 20 MIN: CPT | Performed by: NURSE PRACTITIONER

## 2021-05-11 PROCEDURE — 99214 OFFICE O/P EST MOD 30 MIN: CPT | Performed by: FAMILY MEDICINE

## 2021-05-11 NOTE — PROGRESS NOTES
Subjective   Uriah Willingham is a 60 y.o. male.  Reevaluation morbid obesity type 2 diabetes chronic knee pain sleep apnea history of chronic anticoagulation due to recurrent clots.  Interim maintain weight and blood pressures try to lose weight difficulty because of bilateral knee pain.  Continues on chronic narcotics for chronic pain.  A1c today holding steady at 5.6.  Is up-to-date on all other.  Continues to see orthopedics etc.  Sleep apnea continues to be treated.    History of Present Illness   HPI    The following portions of the patient's history were reviewed and updated as appropriate: allergies, current medications, past family history, past medical history, past social history, past surgical history and problem list.    Review of Systems  Review of Systems   Constitutional: Negative for activity change, appetite change, fatigue and unexpected weight change.   HENT: Negative for trouble swallowing and voice change.    Eyes: Negative for redness and visual disturbance.   Respiratory: Negative for cough and wheezing.    Cardiovascular: Negative for chest pain and palpitations.   Gastrointestinal: Negative for abdominal pain, constipation, diarrhea, nausea and vomiting.   Genitourinary: Negative for urgency.   Musculoskeletal: Positive for arthralgias, back pain and gait problem. Joint swelling: Relative new onset of right plantar fasciitis improving.   Neurological: Negative for syncope and headaches.   Hematological: Negative for adenopathy.   Psychiatric/Behavioral: Negative for sleep disturbance.       Objective   Physical Exam  Physical Exam  Constitutional:       Appearance: He is well-developed.   HENT:      Head: Normocephalic.   Eyes:      Pupils: Pupils are equal, round, and reactive to light.   Neck:      Thyroid: No thyromegaly.   Cardiovascular:      Rate and Rhythm: Normal rate and regular rhythm.      Heart sounds: Normal heart sounds. No murmur heard.   No friction rub. No gallop.   "  Pulmonary:      Breath sounds: Normal breath sounds.   Abdominal:      General: There is no distension.      Palpations: Abdomen is soft. There is no mass.      Tenderness: There is no abdominal tenderness.   Musculoskeletal:         General: Normal range of motion.      Cervical back: Normal range of motion.      Comments: Continue venous insufficiency get up and go 3 to 5 seconds continues with cane off and on due to knees   Skin:     General: Skin is warm and dry.   Neurological:      Mental Status: He is alert and oriented to person, place, and time.      Deep Tendon Reflexes: Reflexes are normal and symmetric.   Psychiatric:         Attention and Perception: Attention normal.         Mood and Affect: Mood normal.         Behavior: Behavior normal.         Thought Content: Thought content normal.         Cognition and Memory: Cognition normal.       Results for orders placed or performed in visit on 05/11/21   POC Glycosylated Hemoglobin (Hb A1C)    Specimen: Blood   Result Value Ref Range    Hemoglobin A1C 5.6 %       /80   Ht 172.7 cm (68\")   Wt (!) 162 kg (357 lb)   BMI 54.28 kg/m²       Visit Vitals  /80   Ht 172.7 cm (68\")   Wt (!) 162 kg (357 lb)   BMI 54.28 kg/m²     Body mass index is 54.28 kg/m².      Assessment/Plan   Diagnoses and all orders for this visit:    1. Type 2 diabetes mellitus without complication, without long-term current use of insulin (CMS/[facility]) (Primary)  -     POC Glycosylated Hemoglobin (Hb A1C)  -     Hemoglobin A1c; Future  -     Lipid Panel With LDL / HDL Ratio; Future  -     Magnesium; Future    2. Morbid (severe) obesity due to excess calories (CMS/[facility])    3. Essential hypertension, benign  -     Comprehensive Metabolic Panel; Future  -     Magnesium; Future    4. Chronic anticoagulation  -     CBC (No Diff); Future    5. Screening for malignant neoplasm of prostate  -     PSA Screen; Future     on wt loss measures and programs  Counseled mainly on " lifestyle measures bleeding prevention hydration plantar fasciitis treatment etc.  Counseled on following up with all subspecialist.  Recheck 4months with all lab prior.  Counseled on Covid vaccine is in the past.

## 2021-05-11 NOTE — PROGRESS NOTES
"Uriah Willingham is a 60 y.o. male returns for     Chief Complaint   Patient presents with   • Left Knee - Follow-up   • Right Knee - Follow-up       HISTORY OF PRESENT ILLNESS: Patient presents to office for follow-up of chronic bilateral knee pain due to end-stage osteoarthritis.  Patient has experienced ongoing bilateral knee pain for several years that has progressively worsened over time.  Patient was previously followed per Dr. Cedillo and has been treated conservatively with intra-articular injections of steroid, IM injections of steroid and IM injections of Toradol.  Viscosupplementation was also previously ordered per Dr. Cedillo but his insurance would not cover this.  Last injections were given per Dr. Cedillo on 2/17/2021, which offered him some pain improvement but he states the injections only seem to last 6 to 8 weeks now where they previously were more effective.  Patient states that Dr. Cedillo had told him he was not a surgical candidate due to his recurrent cellulitis in his lower legs.  No new complaints or concerns noted since last office visit.  Patient uses a cane for ambulatory assistance.  No falls or injuries reported.  Pain scale today is 6/10.     CONCURRENT MEDICAL HISTORY:    The following portions of the patient's history were reviewed and updated as appropriate: allergies, current medications, past family history, past medical history, past social history, past surgical history and problem list.     ROS  No fevers or chills.  No chest pain or shortness of air.  No GI or  disturbances. Right knee pain. Left knee pain.     PHYSICAL EXAMINATION:       Ht 172.7 cm (68\")   Wt (!) 162 kg (357 lb)   BMI 54.28 kg/m²     Physical Exam  Vitals reviewed.   Constitutional:       General: He is not in acute distress.     Appearance: He is well-developed. He is obese. He is not ill-appearing.   HENT:      Head: Normocephalic.   Pulmonary:      Effort: Pulmonary effort is normal. " No respiratory distress.   Abdominal:      General: There is no distension.      Palpations: Abdomen is soft.   Musculoskeletal:         General: Swelling (Mild, right knee, left knee) and tenderness (Right knee, Left knee) present. No deformity or signs of injury.      Right knee: No effusion.      Left knee: No effusion.   Skin:     General: Skin is warm and dry.      Capillary Refill: Capillary refill takes less than 2 seconds.      Findings: No erythema.   Neurological:      Mental Status: He is alert and oriented to person, place, and time.      GCS: GCS eye subscore is 4. GCS verbal subscore is 5. GCS motor subscore is 6.   Psychiatric:         Speech: Speech normal.         Behavior: Behavior normal.         Thought Content: Thought content normal.         Judgment: Judgment normal.         GAIT:     []  Normal  [x]  Antalgic    Assistive device: []  None  []  Walker     []  Crutches  [x]  Cane     []  Wheelchair  []  Stretcher    Right Knee Exam     Tenderness   The patient is experiencing tenderness in the lateral joint line and medial joint line (Mild, diffuse).    Range of Motion   Extension: 5   Flexion: 100     Other   Erythema: absent  Sensation: normal  Pulse: present  Swelling: mild  Effusion: no effusion present    Comments:  Pain and limitations with range of motion.  Crepitus with range of motion.  Mild, generalized swelling noted.  No definitive effusion.  No erythema.  No warmth.  No signs of infection noted.      Left Knee Exam     Tenderness   The patient is experiencing tenderness in the medial joint line (Mild, diffuse).    Range of Motion   Extension: 5   Flexion: 110     Other   Erythema: absent  Sensation: normal  Pulse: present  Swelling: mild  Effusion: no effusion present    Comments:  Pain and limitations with range of motion.  Crepitus with range of motion.  Mild, generalized swelling noted.  No definitive effusion.  No erythema.  No warmth.  No signs of infection  noted.            Standing AP bilateral knee without comparison     Markedly severe arthritic changes seen bilaterally worse on the right than the left with complete obliteration of joint space on the right.  No acute findings varus deformity is noted     Impression: Severe DJD bilateral knees, right worse than left    Narrative & Impression   Lateral view bilateral knee without comparison     Marked arthritic changes seen patellofemoral joint and markedly narrowed femoral-tibial joint.  Slightly worse on the right side.  Posterior osteophytes are also present bilaterally     Impression: Severe degenerative changes noted bilateral knees lateral view only         ASSESSMENT:    Diagnoses and all orders for this visit:    Primary osteoarthritis of both knees  -     Large Joint Arthrocentesis: R knee  -     Large Joint Arthrocentesis: L knee    Chronic pain of both knees  -     Large Joint Arthrocentesis: R knee  -     Large Joint Arthrocentesis: L knee    PLAN    Large Joint Arthrocentesis: R knee  Date/Time: 5/11/2021 1:13 PM  Consent given by: patient  Timeout: Immediately prior to procedure a time out was called to verify the correct patient, procedure, equipment, support staff and site/side marked as required   Supporting Documentation  Indications: pain, joint swelling and diagnostic evaluation   Procedure Details  Location: knee - R knee  Preparation: Patient was prepped and draped in the usual sterile fashion  Needle size: 22 G  Approach: anterolateral  Medications administered: 4 mL lidocaine 1 %, 2 mL triamcinolone acetonide 40 MG/ML  Patient tolerance: patient tolerated the procedure well with no immediate complications    Large Joint Arthrocentesis: L knee  Date/Time: 5/11/2021 1:13 PM  Consent given by: patient  Timeout: Immediately prior to procedure a time out was called to verify the correct patient, procedure, equipment, support staff and site/side marked as required   Supporting  Documentation  Indications: pain, diagnostic evaluation and joint swelling   Procedure Details  Location: knee - L knee  Preparation: Patient was prepped and draped in the usual sterile fashion  Needle size: 22 G  Approach: anterolateral  Medications administered: 4 mL lidocaine 1 %, 2 mL triamcinolone acetonide 40 MG/ML  Patient tolerance: patient tolerated the procedure well with no immediate complications        Patient is here today for follow-up of his chronic bilateral knee pain.  Patient has end-stage osteoarthritic changes in both knees, but worse in the right knee.  Patient has experienced ongoing and chronic bilateral knee pain for many years that has continued to progressively worsen.  This is my first visit with this patient, who was previously followed per Dr. Cedillo.  Patient does get several weeks of improvement with intra-articular injections of steroid and wants to proceed with repeat injections today. Recommend repeat intra-articular injections of steroid to the bilateral knees for management of pain/swelling/inflammation.  Patient does report that he gets good pain improvement but the injections do not seem to last as long and he is only getting improvement for 6 to 8 weeks before the pain returns.  Patient was previously ordered viscosupplementation injections but his insurance do not cover these.  Patient also states that Dr. Cedillo had previously told him he was not a surgical candidate due to the recurrent cellulitis in his lower legs.  Patient also has a BMI 54.28.  Due to these issues, treatment options are limited for his severe, end-stage osteoarthritic changes in his bilateral knees.    Recommend the following:  Rest and activity modification as tolerated and based on his pain.  Continue with use of a cane or use of a walker for modified weightbearing.  Patient can gradually progress his weightbearing and activity as pain allows.  Continue with efforts for aggressive weight loss  to improve his knee pain as well as his overall health.  Elevation and ice therapy to the bilateral knees as needed to minimize pain/swelling/inflammation.    Patient already takes Norco 7.5 mg tablets for her chronic pain per pain management.  He can take some additional Tylenol as needed but should not exceed more than 4000 mg of acetaminophen in a 24-hour period.  Patient should avoid oral NSAIDs due to his current use of Eliquis.     Follow-up in 8 to 12 weeks for recheck as needed for any new, worsening or persistent symptoms.    Old medical records (electronic) are reviewed and summarized today.    EMR Dragon/Transciption Disclaimer: Some of this note may be an electronic transcription/translation of spoken language to printed text.  The electronic translation of spoken language may permit erroneous, or at times, nonsensical words or phrases to be inadvertently transcribed. Although I have reviewed the note for such errors, some may still exist.     Return in about 3 months (around 8/11/2021), or if symptoms worsen or fail to improve, for Recheck.        This document has been electronically signed by TUNDE Joshi on May 12, 2021 17:03 CDT      TUNDE Joshi

## 2021-07-07 DIAGNOSIS — I26.99 RECURRENT PULMONARY EMBOLI (HCC): ICD-10-CM

## 2021-07-07 RX ORDER — APIXABAN 5 MG/1
TABLET, FILM COATED ORAL
Qty: 180 TABLET | Refills: 2 | Status: SHIPPED | OUTPATIENT
Start: 2021-07-07 | End: 2022-04-05

## 2021-07-30 ENCOUNTER — OFFICE VISIT (OUTPATIENT)
Dept: ORTHOPEDIC SURGERY | Facility: CLINIC | Age: 61
End: 2021-07-30

## 2021-07-30 VITALS — BODY MASS INDEX: 47.74 KG/M2 | HEIGHT: 68 IN | WEIGHT: 315 LBS

## 2021-07-30 DIAGNOSIS — M25.561 CHRONIC PAIN OF BOTH KNEES: ICD-10-CM

## 2021-07-30 DIAGNOSIS — E66.01 OBESITY, MORBID, BMI 50 OR HIGHER (HCC): ICD-10-CM

## 2021-07-30 DIAGNOSIS — M25.562 CHRONIC PAIN OF BOTH KNEES: ICD-10-CM

## 2021-07-30 DIAGNOSIS — M17.0 PRIMARY OSTEOARTHRITIS OF BOTH KNEES: Primary | ICD-10-CM

## 2021-07-30 DIAGNOSIS — G89.29 CHRONIC PAIN OF BOTH KNEES: ICD-10-CM

## 2021-07-30 PROCEDURE — 20610 DRAIN/INJ JOINT/BURSA W/O US: CPT | Performed by: NURSE PRACTITIONER

## 2021-07-30 RX ORDER — TRIAMCINOLONE ACETONIDE 40 MG/ML
80 INJECTION, SUSPENSION INTRA-ARTICULAR; INTRAMUSCULAR
Status: COMPLETED | OUTPATIENT
Start: 2021-07-30 | End: 2021-07-30

## 2021-07-30 RX ORDER — LIDOCAINE HYDROCHLORIDE 10 MG/ML
4 INJECTION, SOLUTION INFILTRATION; PERINEURAL
Status: COMPLETED | OUTPATIENT
Start: 2021-07-30 | End: 2021-07-30

## 2021-07-30 RX ORDER — LIDOCAINE HYDROCHLORIDE 10 MG/ML
4 INJECTION, SOLUTION EPIDURAL; INFILTRATION; INTRACAUDAL; PERINEURAL
Status: COMPLETED | OUTPATIENT
Start: 2021-07-30 | End: 2021-07-30

## 2021-07-30 RX ADMIN — LIDOCAINE HYDROCHLORIDE 4 ML: 10 INJECTION, SOLUTION INFILTRATION; PERINEURAL at 14:23

## 2021-07-30 RX ADMIN — TRIAMCINOLONE ACETONIDE 80 MG: 40 INJECTION, SUSPENSION INTRA-ARTICULAR; INTRAMUSCULAR at 14:23

## 2021-07-30 RX ADMIN — TRIAMCINOLONE ACETONIDE 80 MG: 40 INJECTION, SUSPENSION INTRA-ARTICULAR; INTRAMUSCULAR at 14:24

## 2021-07-30 RX ADMIN — LIDOCAINE HYDROCHLORIDE 4 ML: 10 INJECTION, SOLUTION EPIDURAL; INFILTRATION; INTRACAUDAL; PERINEURAL at 14:24

## 2021-07-30 NOTE — PROGRESS NOTES
"Uriah Willingham is a 60 y.o. male returns for     Chief Complaint   Patient presents with   • Left Knee - Follow-up, Pain   • Right Knee - Follow-up, Pain     HISTORY OF PRESENT ILLNESS: Patient presents to office for follow-up of chronic bilateral knee pain due to end-stage osteoarthritis.  Patient has experienced ongoing bilateral knee pain for several years that has progressively worsened over time.  Patient has been treated conservatively with multiple previous intra-articular injections of steroid, IM injections of steroid, IM injections of Toradol and use of a cane for modified weightbearing and ambulatory assistance.  Patient was previously under the care of Dr. Cedillo and the patient reported to me at last office visit that he was told he was not a surgical candidate due to recurrent cellulitis in his lower legs.  Patient reports good pain improvement with the last injections given on 5/11/2021, but he states that the injections only last him for 6 to 8 weeks and then the pain begins to return.  Patient takes Norco 7.5 mg for chronic pain per his pain management physician.  Patient is unable to take oral NSAIDs due to his current use of Eliquis.  Viscosupplementation was previously ordered by Dr. Cedillo but his insurance would not cover this.  No new complaints or concerns noted since last office visit.  No falls or injuries reported.  Pain scale today is 6/10.     CONCURRENT MEDICAL HISTORY:    The following portions of the patient's history were reviewed and updated as appropriate: allergies, current medications, past family history, past medical history, past social history, past surgical history and problem list.     ROS  No fevers or chills.  No chest pain or shortness of air.  No GI or  disturbances. Right knee pain. Left knee pain.     PHYSICAL EXAMINATION:       Ht 172.7 cm (68\")   Wt (!) 162 kg (357 lb)   BMI 54.28 kg/m²     Physical Exam  Vitals reviewed.   Constitutional:       " General: He is not in acute distress.     Appearance: He is well-developed. He is obese. He is not ill-appearing.   HENT:      Head: Normocephalic.   Pulmonary:      Effort: Pulmonary effort is normal. No respiratory distress.   Abdominal:      General: There is no distension.      Palpations: Abdomen is soft.   Musculoskeletal:         General: Swelling (Mild, right knee, left knee) and tenderness (Right knee, Left knee) present. No deformity or signs of injury.      Right knee: No effusion.      Left knee: No effusion.      Right lower leg: Edema present.      Left lower leg: Edema present.   Skin:     General: Skin is warm and dry.      Capillary Refill: Capillary refill takes less than 2 seconds.      Findings: No erythema.   Neurological:      Mental Status: He is alert and oriented to person, place, and time.      GCS: GCS eye subscore is 4. GCS verbal subscore is 5. GCS motor subscore is 6.   Psychiatric:         Speech: Speech normal.         Behavior: Behavior normal.         Thought Content: Thought content normal.         Judgment: Judgment normal.         GAIT:     []  Normal  [x]  Antalgic    Assistive device: []  None  []  Walker     []  Crutches  [x]  Cane     []  Wheelchair  []  Stretcher    Right Knee Exam     Tenderness   The patient is experiencing tenderness in the lateral joint line and medial joint line (Mild, diffuse).    Range of Motion   Extension: 5   Flexion: 100     Other   Erythema: absent  Sensation: normal  Pulse: present  Swelling: mild  Effusion: no effusion present    Comments:  Pain and limitations with range of motion.  Crepitus with range of motion.  Mild, generalized swelling noted.  No definitive effusion.  No erythema.  No warmth.  No signs of infection noted.      Left Knee Exam     Tenderness   The patient is experiencing tenderness in the medial joint line (Mild, diffuse).    Range of Motion   Extension: 5   Flexion: 110     Other   Erythema: absent  Sensation:  normal  Pulse: present  Swelling: mild  Effusion: no effusion present    Comments:  Pain and limitations with range of motion.  Crepitus with range of motion.  Mild, generalized swelling noted.  No definitive effusion.  No erythema.  No warmth.  No signs of infection noted.              Standing AP bilateral knee without comparison     Markedly severe arthritic changes seen bilaterally worse on the right than the left with complete obliteration of joint space on the right.  No acute findings varus deformity is noted     Impression: Severe DJD bilateral knees, right worse than left     Narrative & Impression   Lateral view bilateral knee without comparison     Marked arthritic changes seen patellofemoral joint and markedly narrowed femoral-tibial joint.  Slightly worse on the right side.  Posterior osteophytes are also present bilaterally     Impression: Severe degenerative changes noted bilateral knees lateral view only         ASSESSMENT:    Diagnoses and all orders for this visit:    Primary osteoarthritis of both knees  -     Large Joint Arthrocentesis: R knee  -     Large Joint Arthrocentesis: L knee    Chronic pain of both knees  -     Large Joint Arthrocentesis: R knee  -     Large Joint Arthrocentesis: L knee    Obesity, morbid, BMI 50 or higher (CMS/HCC)      Large Joint Arthrocentesis: R knee  Date/Time: 7/30/2021 2:23 PM  Consent given by: patient  Timeout: Immediately prior to procedure a time out was called to verify the correct patient, procedure, equipment, support staff and site/side marked as required   Supporting Documentation  Indications: pain, joint swelling and diagnostic evaluation   Procedure Details  Location: knee - R knee  Preparation: Patient was prepped and draped in the usual sterile fashion  Needle size: 22 G  Approach: anterolateral  Medications administered: 4 mL lidocaine 1 %; 80 mg triamcinolone acetonide 40 MG/ML  Patient tolerance: patient tolerated the procedure well with no  immediate complications    Large Joint Arthrocentesis: L knee  Date/Time: 7/30/2021 2:24 PM  Consent given by: patient  Timeout: Immediately prior to procedure a time out was called to verify the correct patient, procedure, equipment, support staff and site/side marked as required   Supporting Documentation  Indications: pain, joint swelling and diagnostic evaluation   Procedure Details  Location: knee - L knee  Preparation: Patient was prepped and draped in the usual sterile fashion  Needle size: 22 G  Approach: anterolateral  Medications administered: 80 mg triamcinolone acetonide 40 MG/ML; 4 mL lidocaine PF 1% 1 %  Patient tolerance: patient tolerated the procedure well with no immediate complications      PLAN    Patient complains of persistent/chronic bilateral knee pain due to end-stage osteoarthritic changes.  Patient is not a surgical candidate due to his excessive BMI (54.28) and was also previously told he was not a surgical candidate due to the recurrent cellulitis in his lower legs.  Recommend proceeding today with repeat intra-articular injections of steroid to the bilateral knees for management of joint pain/inflammation/swelling.  Patient typically gets good pain improvement for approximately 6 to 8 weeks but then the injections wear off and his pain returns.  Patient has not sustained any falls or injuries since his last office visit.    Recommend the following:  Rest and activity modification as tolerated and based on his pain.  Continue with use of a cane or use of a walker for modified weightbearing.  Patient can gradually progress his weightbearing and activity as pain allows.  Continue with efforts for aggressive weight loss to improve his knee pain as well as his overall health.  Elevation and ice therapy to the bilateral knees as needed to minimize pain/swelling/inflammation.     Patient already takes Norco 7.5 mg tablets for her chronic pain per pain management.  He can take some additional  Tylenol as needed but should not exceed more than 4000 mg of acetaminophen in a 24-hour period.  Patient should avoid oral NSAIDs due to his current use of Eliquis.     Follow-up in 8 to 12 weeks for recheck as needed for any new, worsening or persistent symptoms.    EMR Dragon/Transciption Disclaimer: Some of this note may be an electronic transcription/translation of spoken language to printed text.  The electronic translation of spoken language may permit erroneous, or at times, nonsensical words or phrases to be inadvertently transcribed. Although I have reviewed the note for such errors, some may still exist.     Return in about 3 months (around 10/30/2021), or if symptoms worsen or fail to improve, for Recheck.        This document has been electronically signed by TUNDE Joshi on July 30, 2021 16:15 CDT      TUNDE Joshi

## 2021-09-02 ENCOUNTER — LAB (OUTPATIENT)
Dept: LAB | Facility: HOSPITAL | Age: 61
End: 2021-09-02

## 2021-09-02 DIAGNOSIS — Z79.01 CHRONIC ANTICOAGULATION: Chronic | ICD-10-CM

## 2021-09-02 DIAGNOSIS — E11.9 TYPE 2 DIABETES MELLITUS WITHOUT COMPLICATION, WITHOUT LONG-TERM CURRENT USE OF INSULIN (HCC): Chronic | ICD-10-CM

## 2021-09-02 DIAGNOSIS — Z12.5 SCREENING FOR MALIGNANT NEOPLASM OF PROSTATE: ICD-10-CM

## 2021-09-02 DIAGNOSIS — I10 ESSENTIAL HYPERTENSION, BENIGN: Chronic | ICD-10-CM

## 2021-09-02 LAB
ALBUMIN SERPL-MCNC: 4.2 G/DL (ref 3.5–5.2)
ALBUMIN/GLOB SERPL: 1.7 G/DL
ALP SERPL-CCNC: 46 U/L (ref 39–117)
ALT SERPL W P-5'-P-CCNC: 18 U/L (ref 1–41)
ANION GAP SERPL CALCULATED.3IONS-SCNC: 9.9 MMOL/L (ref 5–15)
AST SERPL-CCNC: 14 U/L (ref 1–40)
BILIRUB SERPL-MCNC: 1.3 MG/DL (ref 0–1.2)
BUN SERPL-MCNC: 18 MG/DL (ref 6–20)
BUN/CREAT SERPL: 22.2 (ref 7–25)
CALCIUM SPEC-SCNC: 9.3 MG/DL (ref 8.6–10.5)
CHLORIDE SERPL-SCNC: 106 MMOL/L (ref 98–107)
CHOLEST SERPL-MCNC: 175 MG/DL (ref 0–200)
CO2 SERPL-SCNC: 22.1 MMOL/L (ref 22–29)
CREAT SERPL-MCNC: 0.81 MG/DL (ref 0.76–1.27)
DEPRECATED RDW RBC AUTO: 41.8 FL (ref 37–54)
ERYTHROCYTE [DISTWIDTH] IN BLOOD BY AUTOMATED COUNT: 12.8 % (ref 12.3–15.4)
GFR SERPL CREATININE-BSD FRML MDRD: 98 ML/MIN/1.73
GLOBULIN UR ELPH-MCNC: 2.5 GM/DL
GLUCOSE SERPL-MCNC: 101 MG/DL (ref 65–99)
HBA1C MFR BLD: 5.7 % (ref 4.8–5.6)
HCT VFR BLD AUTO: 45.1 % (ref 37.5–51)
HDLC SERPL-MCNC: 48 MG/DL (ref 40–60)
HGB BLD-MCNC: 15.7 G/DL (ref 13–17.7)
LDLC SERPL CALC-MCNC: 106 MG/DL (ref 0–100)
LDLC/HDLC SERPL: 2.17 {RATIO}
MAGNESIUM SERPL-MCNC: 1.8 MG/DL (ref 1.6–2.6)
MCH RBC QN AUTO: 31 PG (ref 26.6–33)
MCHC RBC AUTO-ENTMCNC: 34.8 G/DL (ref 31.5–35.7)
MCV RBC AUTO: 89.1 FL (ref 79–97)
PLATELET # BLD AUTO: 213 10*3/MM3 (ref 140–450)
PMV BLD AUTO: 10.4 FL (ref 6–12)
POTASSIUM SERPL-SCNC: 4.7 MMOL/L (ref 3.5–5.2)
PROT SERPL-MCNC: 6.7 G/DL (ref 6–8.5)
PSA SERPL-MCNC: 0.67 NG/ML (ref 0–4)
RBC # BLD AUTO: 5.06 10*6/MM3 (ref 4.14–5.8)
SODIUM SERPL-SCNC: 138 MMOL/L (ref 136–145)
TRIGL SERPL-MCNC: 115 MG/DL (ref 0–150)
VLDLC SERPL-MCNC: 21 MG/DL (ref 5–40)
WBC # BLD AUTO: 6.8 10*3/MM3 (ref 3.4–10.8)

## 2021-09-02 PROCEDURE — 85027 COMPLETE CBC AUTOMATED: CPT

## 2021-09-02 PROCEDURE — G0103 PSA SCREENING: HCPCS

## 2021-09-02 PROCEDURE — 36415 COLL VENOUS BLD VENIPUNCTURE: CPT

## 2021-09-02 PROCEDURE — 80053 COMPREHEN METABOLIC PANEL: CPT

## 2021-09-02 PROCEDURE — 83735 ASSAY OF MAGNESIUM: CPT

## 2021-09-02 PROCEDURE — 83036 HEMOGLOBIN GLYCOSYLATED A1C: CPT

## 2021-09-02 PROCEDURE — 80061 LIPID PANEL: CPT

## 2021-09-07 ENCOUNTER — OFFICE VISIT (OUTPATIENT)
Dept: FAMILY MEDICINE CLINIC | Facility: CLINIC | Age: 61
End: 2021-09-07

## 2021-09-07 VITALS
BODY MASS INDEX: 47.74 KG/M2 | DIASTOLIC BLOOD PRESSURE: 72 MMHG | WEIGHT: 315 LBS | HEIGHT: 68 IN | SYSTOLIC BLOOD PRESSURE: 124 MMHG

## 2021-09-07 DIAGNOSIS — E66.01 OBESITY, MORBID, BMI 50 OR HIGHER (HCC): Chronic | ICD-10-CM

## 2021-09-07 DIAGNOSIS — I10 ESSENTIAL HYPERTENSION, BENIGN: Chronic | ICD-10-CM

## 2021-09-07 DIAGNOSIS — Z79.01 CHRONIC ANTICOAGULATION: Chronic | ICD-10-CM

## 2021-09-07 DIAGNOSIS — E11.9 TYPE 2 DIABETES MELLITUS WITHOUT COMPLICATION, WITHOUT LONG-TERM CURRENT USE OF INSULIN (HCC): Primary | Chronic | ICD-10-CM

## 2021-09-07 DIAGNOSIS — M17.0 PRIMARY OSTEOARTHRITIS OF BOTH KNEES: Chronic | ICD-10-CM

## 2021-09-07 PROCEDURE — 99214 OFFICE O/P EST MOD 30 MIN: CPT | Performed by: FAMILY MEDICINE

## 2021-09-07 NOTE — PROGRESS NOTES
Subjective   Uriah Willingham is a 61 y.o. male.  Reevaluation diabetes hypertension morbid obesity history of recurrent pulmonary emboli chronic anticoagulation sleep apnea etc.  The interim lab work is excellent try to watch diet more be more active enforces not lost any weight.  He is on chronic anticoagulation without problem.  Has had first Covid will get second Covid vaccine this week.  Counseled getting flu vaccine in the fall.  Up-to-date on all other.    History of Present Illness   HPI    The following portions of the patient's history were reviewed and updated as appropriate: allergies, current medications, past family history, past medical history, past social history, past surgical history and problem list.    Review of Systems  Review of Systems   Constitutional: Negative for activity change, appetite change, fatigue and unexpected weight change.   HENT: Negative for trouble swallowing and voice change.    Eyes: Negative for redness and visual disturbance.   Respiratory: Negative for cough and wheezing.    Cardiovascular: Positive for leg swelling (Chronic venous insufficiency). Negative for chest pain and palpitations.   Gastrointestinal: Negative for abdominal pain, constipation, diarrhea, nausea and vomiting.   Genitourinary: Negative for urgency.   Musculoskeletal: Positive for arthralgias (Knees chronic). Negative for joint swelling.   Neurological: Negative for syncope and headaches.   Hematological: Negative for adenopathy.   Psychiatric/Behavioral: Negative for sleep disturbance.       Objective   Physical Exam  Physical Exam  Constitutional:       Appearance: He is well-developed.   HENT:      Head: Normocephalic.   Eyes:      Pupils: Pupils are equal, round, and reactive to light.   Neck:      Thyroid: No thyromegaly.   Cardiovascular:      Rate and Rhythm: Normal rate and regular rhythm.      Heart sounds: Normal heart sounds. No murmur heard.   No friction rub. No gallop.    Pulmonary:       Breath sounds: Normal breath sounds.   Abdominal:      General: There is no distension.      Palpations: Abdomen is soft. There is no mass.      Tenderness: There is no abdominal tenderness.   Musculoskeletal:         General: Normal range of motion.      Cervical back: Normal range of motion.      Comments: Continued chronic venous changes trace to 1+ dependent edema rubor but no skin breakdown.  Feet intact.  Get up and go 3 to 5 seconds slightly wide-based gait   Skin:     General: Skin is warm and dry.   Neurological:      Mental Status: He is alert and oriented to person, place, and time.      Deep Tendon Reflexes: Reflexes are normal and symmetric.   Psychiatric:         Attention and Perception: Attention normal.         Mood and Affect: Mood normal.         Speech: Speech normal.         Behavior: Behavior normal.         Thought Content: Thought content normal.         Cognition and Memory: Cognition normal.       Results for orders placed or performed in visit on 09/02/21   CBC (No Diff)    Specimen: Blood   Result Value Ref Range    WBC 6.80 3.40 - 10.80 10*3/mm3    RBC 5.06 4.14 - 5.80 10*6/mm3    Hemoglobin 15.7 13.0 - 17.7 g/dL    Hematocrit 45.1 37.5 - 51.0 %    MCV 89.1 79.0 - 97.0 fL    MCH 31.0 26.6 - 33.0 pg    MCHC 34.8 31.5 - 35.7 g/dL    RDW 12.8 12.3 - 15.4 %    RDW-SD 41.8 37.0 - 54.0 fl    MPV 10.4 6.0 - 12.0 fL    Platelets 213 140 - 450 10*3/mm3   Comprehensive Metabolic Panel    Specimen: Blood   Result Value Ref Range    Glucose 101 (H) 65 - 99 mg/dL    BUN 18 6 - 20 mg/dL    Creatinine 0.81 0.76 - 1.27 mg/dL    Sodium 138 136 - 145 mmol/L    Potassium 4.7 3.5 - 5.2 mmol/L    Chloride 106 98 - 107 mmol/L    CO2 22.1 22.0 - 29.0 mmol/L    Calcium 9.3 8.6 - 10.5 mg/dL    Total Protein 6.7 6.0 - 8.5 g/dL    Albumin 4.20 3.50 - 5.20 g/dL    ALT (SGPT) 18 1 - 41 U/L    AST (SGOT) 14 1 - 40 U/L    Alkaline Phosphatase 46 39 - 117 U/L    Total Bilirubin 1.3 (H) 0.0 - 1.2 mg/dL    eGFR Non  " Amer 98 >60 mL/min/1.73    Globulin 2.5 gm/dL    A/G Ratio 1.7 g/dL    BUN/Creatinine Ratio 22.2 7.0 - 25.0    Anion Gap 9.9 5.0 - 15.0 mmol/L   Hemoglobin A1c    Specimen: Blood   Result Value Ref Range    Hemoglobin A1C 5.70 (H) 4.80 - 5.60 %   Lipid Panel With LDL / HDL Ratio    Specimen: Blood   Result Value Ref Range    Total Cholesterol 175 0 - 200 mg/dL    Triglycerides 115 0 - 150 mg/dL    HDL Cholesterol 48 40 - 60 mg/dL    LDL Cholesterol  106 (H) 0 - 100 mg/dL    VLDL Cholesterol 21 5 - 40 mg/dL    LDL/HDL Ratio 2.17    Magnesium    Specimen: Blood   Result Value Ref Range    Magnesium 1.8 1.6 - 2.6 mg/dL   PSA Screen    Specimen: Blood   Result Value Ref Range    PSA 0.672 0.000 - 4.000 ng/mL           Visit Vitals  /72   Ht 172.7 cm (68\")   Wt (!) 166 kg (365 lb 3.2 oz)   BMI 55.53 kg/m²     Body mass index is 55.53 kg/m².  /72   Ht 172.7 cm (68\")   Wt (!) 166 kg (365 lb 3.2 oz)   BMI 55.53 kg/m²       Assessment/Plan   Diagnoses and all orders for this visit:    1. Type 2 diabetes mellitus without complication, without long-term current use of insulin (CMS/[facility]) (Primary)    2. Essential hypertension, benign    3. Chronic anticoagulation    4. Obesity, morbid, BMI 50 or higher (CMS/HCC)    5. Primary osteoarthritis of both knees    Obesity counseled mainly on to continue weight loss measures diet exercise vaccines as mentioned recheck 4 months A1c here  "

## 2021-10-15 DIAGNOSIS — M25.561 CHRONIC PAIN OF BOTH KNEES: Primary | ICD-10-CM

## 2021-10-15 DIAGNOSIS — M25.562 CHRONIC PAIN OF BOTH KNEES: Primary | ICD-10-CM

## 2021-10-15 DIAGNOSIS — G89.29 CHRONIC PAIN OF BOTH KNEES: Primary | ICD-10-CM

## 2021-11-01 ENCOUNTER — OFFICE VISIT (OUTPATIENT)
Dept: ORTHOPEDIC SURGERY | Facility: CLINIC | Age: 61
End: 2021-11-01

## 2021-11-01 VITALS — BODY MASS INDEX: 47.74 KG/M2 | HEIGHT: 68 IN | WEIGHT: 315 LBS

## 2021-11-01 DIAGNOSIS — G89.29 CHRONIC PAIN OF BOTH KNEES: Primary | ICD-10-CM

## 2021-11-01 DIAGNOSIS — E66.01 OBESITY, MORBID, BMI 50 OR HIGHER (HCC): ICD-10-CM

## 2021-11-01 DIAGNOSIS — M25.561 CHRONIC PAIN OF BOTH KNEES: Primary | ICD-10-CM

## 2021-11-01 DIAGNOSIS — M25.562 CHRONIC PAIN OF BOTH KNEES: Primary | ICD-10-CM

## 2021-11-01 DIAGNOSIS — M17.0 PRIMARY OSTEOARTHRITIS OF BOTH KNEES: ICD-10-CM

## 2021-11-01 PROCEDURE — 20610 DRAIN/INJ JOINT/BURSA W/O US: CPT | Performed by: NURSE PRACTITIONER

## 2021-11-01 RX ORDER — LIDOCAINE HYDROCHLORIDE 10 MG/ML
4 INJECTION, SOLUTION INFILTRATION; PERINEURAL
Status: COMPLETED | OUTPATIENT
Start: 2021-11-01 | End: 2021-11-01

## 2021-11-01 RX ORDER — TRIAMCINOLONE ACETONIDE 40 MG/ML
80 INJECTION, SUSPENSION INTRA-ARTICULAR; INTRAMUSCULAR
Status: COMPLETED | OUTPATIENT
Start: 2021-11-01 | End: 2021-11-01

## 2021-11-01 RX ADMIN — TRIAMCINOLONE ACETONIDE 80 MG: 40 INJECTION, SUSPENSION INTRA-ARTICULAR; INTRAMUSCULAR at 15:34

## 2021-11-01 RX ADMIN — LIDOCAINE HYDROCHLORIDE 4 ML: 10 INJECTION, SOLUTION INFILTRATION; PERINEURAL at 15:33

## 2021-11-01 RX ADMIN — TRIAMCINOLONE ACETONIDE 80 MG: 40 INJECTION, SUSPENSION INTRA-ARTICULAR; INTRAMUSCULAR at 15:33

## 2021-11-01 RX ADMIN — LIDOCAINE HYDROCHLORIDE 4 ML: 10 INJECTION, SOLUTION INFILTRATION; PERINEURAL at 15:34

## 2021-11-01 NOTE — PROGRESS NOTES
Uriah Willingham is a 61 y.o. male returns for     Chief Complaint   Patient presents with   • Right Knee - Follow-up   • Left Knee - Follow-up       HISTORY OF PRESENT ILLNESS: Patient presents to office for follow-up of chronic bilateral knee pain due to end-stage osteoarthritis.  Patient has experienced ongoing and progressively worsening bilateral knee pain over the course of several years.  Patient has been treated conservatively with multiple previous intra-articular injections of steroid, IM injections of steroid, IM injections of Toradol and use of a cane for modified weightbearing.  Patient has previously discussed his candidacy for knee arthroplasty with Dr. Cedillo previously and states he was told that he was not a surgical candidate due to recurrent cellulitis in his lower legs.  Last injections were given on 7/30/2021, which the patient states offered significant pain improvement for about 3 months and then the pain gradually started to return.  Patient states the last injections offered longer improvement than the previous injections given.  Patient has increased pain with longer periods of weightbearing activity.  Patient reports aching and grinding pain in his bilateral knees with intermittent joint swelling and joint stiffness.  Patient continues to take Norco 7.5 mg for chronic pain as prescribed by his pain management physician.  Patient is unable to take oral NSAIDs due to his current use of Eliquis.  Dr. Cedillo had previously tried to order viscosupplementation but the patient's insurance would not cover this.  No new complaints or concerns noted since last office visit.  No falls or injuries reported since last office visit.  Pain scale today is 6/10.    Patient was scheduled for repeat knee x-rays today in office as it has been over one year since his last x-rays.  Patient declines repeat x-ray imaging today and states he will have new x-rays performed at his next visit.    "  CONCURRENT MEDICAL HISTORY:    The following portions of the patient's history were reviewed and updated as appropriate: allergies, current medications, past family history, past medical history, past social history, past surgical history and problem list.     ROS  No fevers or chills.  No chest pain or shortness of air.  No GI or  disturbances.  Bilateral knee pain.    PHYSICAL EXAMINATION:       Ht 172.7 cm (68\")   Wt (!) 166 kg (365 lb)   BMI 55.50 kg/m²     Physical Exam  Vitals reviewed.   Constitutional:       General: He is not in acute distress.     Appearance: He is well-developed. He is obese. He is not ill-appearing.   HENT:      Head: Normocephalic.   Pulmonary:      Effort: Pulmonary effort is normal. No respiratory distress.   Abdominal:      General: There is no distension.      Palpations: Abdomen is soft.   Musculoskeletal:         General: Swelling (Mild, bilateral knees) and tenderness (Bilateral knees) present. No deformity or signs of injury.      Right knee: No effusion.      Left knee: No effusion.      Right lower leg: Edema present.      Left lower leg: Edema present.   Skin:     General: Skin is warm and dry.      Capillary Refill: Capillary refill takes less than 2 seconds.      Findings: No erythema.   Neurological:      Mental Status: He is alert and oriented to person, place, and time.      GCS: GCS eye subscore is 4. GCS verbal subscore is 5. GCS motor subscore is 6.   Psychiatric:         Speech: Speech normal.         Behavior: Behavior normal.         Thought Content: Thought content normal.         Judgment: Judgment normal.         GAIT:     []  Normal  [x]  Antalgic    Assistive device: [x]  None  []  Walker     []  Crutches  []  Cane     []  Wheelchair  []  Stretcher    Right Knee Exam     Tenderness   The patient is experiencing tenderness in the lateral joint line and medial joint line (Mild, diffuse).    Range of Motion   Extension: 5   Flexion: 100     Tests   Varus: " negative Valgus: negative    Other   Erythema: absent  Sensation: normal  Pulse: present  Swelling: mild  Effusion: no effusion present      Left Knee Exam     Tenderness   The patient is experiencing tenderness in the medial joint line (Mild, diffuse).    Range of Motion   Extension: 5   Flexion: 110     Tests   Varus: negative Valgus: negative    Other   Erythema: absent  Sensation: normal  Pulse: present  Swelling: mild  Effusion: no effusion present            Standing AP bilateral knee without comparison     Markedly severe arthritic changes seen bilaterally worse on the right than the left with complete obliteration of joint space on the right.  No acute findings varus deformity is noted     Impression: Severe DJD bilateral knees, right worse than left     Narrative & Impression   Lateral view bilateral knee without comparison     Marked arthritic changes seen patellofemoral joint and markedly narrowed femoral-tibial joint.  Slightly worse on the right side.  Posterior osteophytes are also present bilaterally     Impression: Severe degenerative changes noted bilateral knees lateral view only         ASSESSMENT:    Diagnoses and all orders for this visit:    Chronic pain of both knees  -     Large Joint Arthrocentesis: R knee  -     Large Joint Arthrocentesis: L knee    Primary osteoarthritis of both knees  -     Large Joint Arthrocentesis: R knee  -     Large Joint Arthrocentesis: L knee    Obesity, morbid, BMI 50 or higher ([facility])    PLAN    Large Joint Arthrocentesis: R knee  Date/Time: 11/1/2021 3:33 PM  Consent given by: patient  Timeout: Immediately prior to procedure a time out was called to verify the correct patient, procedure, equipment, support staff and site/side marked as required   Supporting Documentation  Indications: pain, joint swelling and diagnostic evaluation   Procedure Details  Location: knee - R knee  Preparation: Patient was prepped and draped in the usual sterile fashion  Needle size: 22  G  Approach: anterolateral  Medications administered: 4 mL lidocaine 1 %; 80 mg triamcinolone acetonide 40 MG/ML  Patient tolerance: patient tolerated the procedure well with no immediate complications    Large Joint Arthrocentesis: L knee  Date/Time: 11/1/2021 3:34 PM  Consent given by: patient  Timeout: Immediately prior to procedure a time out was called to verify the correct patient, procedure, equipment, support staff and site/side marked as required   Supporting Documentation  Indications: pain, joint swelling and diagnostic evaluation   Procedure Details  Location: knee - L knee  Preparation: Patient was prepped and draped in the usual sterile fashion  Needle size: 22 G  Approach: anterolateral  Medications administered: 80 mg triamcinolone acetonide 40 MG/ML; 4 mL lidocaine 1 %  Patient tolerance: patient tolerated the procedure well with no immediate complications        Patient complains of persistent/chronic bilateral knee pain due to end-stage osteoarthritic changes.  The previous injections offered significant pain improvement for a good 3 months, which is longer than the prior injections had lasted.  Patient is pleased with how long the previous injections lasted him and would like to repeat some injections today.  Patient states his pain is been gradually increasing in recent weeks.  Recommend proceeding today with repeat intra-articular injections of steroid to the bilateral knees for management of joint pain/inflammation/swelling.  As previously noted, the patient was deemed to not be a surgical candidate and he was told that it was due to recurrent cellulitis in the lower extremities.  Also, patient's current BMI is 55.50 so he would not be a surgical candidate for elective knee arthroplasty at his current BMI.  Patient needs to focus on aggressive efforts for weight loss in an effort to improve his bilateral knee pain and take excessive pressure off of the lower extremity joints.  Weight loss  would also offer a multitude of other health benefits.    Recommend the following:  -Rest and activity modification as tolerated and based on his pain.  -Continue with use of a cane or use of a walker for modified weightbearing.  -Patient can gradually progress his weightbearing and activity as pain allows.  -Continue with efforts for aggressive weight loss to improve his knee pain as well as his overall health.  -Elevation and ice therapy to the bilateral knees as needed to minimize pain/swelling/inflammation.     Patient already takes Norco 7.5 mg tablets for her chronic pain per pain management.  He can take some additional Tylenol as needed but should not exceed more than 4000 mg of acetaminophen in a 24-hour period.  Patient should avoid oral NSAIDs due to his current use of Eliquis.     Follow-up in 3 months for recheck as needed for any new, worsening or persistent symptoms.    EMR Dragon/Transciption Disclaimer: Some of this note may be an electronic transcription/translation of spoken language to printed text.  The electronic translation of spoken language may permit erroneous, or at times, nonsensical words or phrases to be inadvertently transcribed. Although I have reviewed the note for such errors, some may still exist.     Return in about 3 months (around 2/1/2022), or if symptoms worsen or fail to improve, for Recheck.        This document has been electronically signed by TUNDE Joshi on November 1, 2021 17:27 CDT      TUNDE Joshi

## 2022-01-13 ENCOUNTER — OFFICE VISIT (OUTPATIENT)
Dept: ORTHOPEDIC SURGERY | Facility: CLINIC | Age: 62
End: 2022-01-13

## 2022-01-13 VITALS — HEIGHT: 68 IN | BODY MASS INDEX: 47.74 KG/M2 | WEIGHT: 315 LBS | HEART RATE: 51 BPM | OXYGEN SATURATION: 97 %

## 2022-01-13 DIAGNOSIS — G89.29 CHRONIC PAIN OF BOTH KNEES: Primary | ICD-10-CM

## 2022-01-13 DIAGNOSIS — E66.01 OBESITY, MORBID, BMI 50 OR HIGHER: ICD-10-CM

## 2022-01-13 DIAGNOSIS — M17.0 PRIMARY OSTEOARTHRITIS OF BOTH KNEES: ICD-10-CM

## 2022-01-13 DIAGNOSIS — M25.561 CHRONIC PAIN OF BOTH KNEES: Primary | ICD-10-CM

## 2022-01-13 DIAGNOSIS — M25.562 CHRONIC PAIN OF BOTH KNEES: Primary | ICD-10-CM

## 2022-01-13 PROCEDURE — 20610 DRAIN/INJ JOINT/BURSA W/O US: CPT | Performed by: NURSE PRACTITIONER

## 2022-01-13 RX ORDER — LIDOCAINE HYDROCHLORIDE 10 MG/ML
4 INJECTION, SOLUTION EPIDURAL; INFILTRATION; INTRACAUDAL; PERINEURAL
Status: COMPLETED | OUTPATIENT
Start: 2022-01-13 | End: 2022-01-13

## 2022-01-13 RX ORDER — TRIAMCINOLONE ACETONIDE 40 MG/ML
80 INJECTION, SUSPENSION INTRA-ARTICULAR; INTRAMUSCULAR
Status: COMPLETED | OUTPATIENT
Start: 2022-01-13 | End: 2022-01-13

## 2022-01-13 RX ORDER — LIDOCAINE HYDROCHLORIDE 10 MG/ML
4 INJECTION, SOLUTION INFILTRATION; PERINEURAL
Status: COMPLETED | OUTPATIENT
Start: 2022-01-13 | End: 2022-01-13

## 2022-01-13 RX ADMIN — TRIAMCINOLONE ACETONIDE 80 MG: 40 INJECTION, SUSPENSION INTRA-ARTICULAR; INTRAMUSCULAR at 09:18

## 2022-01-13 RX ADMIN — LIDOCAINE HYDROCHLORIDE 4 ML: 10 INJECTION, SOLUTION EPIDURAL; INFILTRATION; INTRACAUDAL; PERINEURAL at 09:18

## 2022-01-13 RX ADMIN — LIDOCAINE HYDROCHLORIDE 4 ML: 10 INJECTION, SOLUTION INFILTRATION; PERINEURAL at 09:18

## 2022-01-13 NOTE — PROGRESS NOTES
Knee Joint Injection      Patient: Uriah Willingham    YOB: 1960    Chief Complaint   Patient presents with   • Right Knee - Follow-up, Pain   • Left Knee - Follow-up, Pain   • Injections       History of Present Illness: Patient presents to office for follow-up of chronic bilateral knee pain due to end-stage osteoarthritis. Patient is here today for repeat intra-articular injections of steroid for further management of his chronic knee pain/inflammation.  Patient has experienced ongoing and progressively worsening bilateral knee pain over the course of several years.  Patient has been treated conservatively with multiple previous intra-articular injections of steroid, IM injections of steroid, IM injections of Toradol and use of a cane for modified weightbearing.  Patient has previously discussed his candidacy for knee arthroplasty with Dr. Cedillo previously and states he was told that he was not a surgical candidate due to recurrent cellulitis in his lower legs and also due to elevated BMI.  Last injections were given on 11/1/2021, which offered good pain improvement but only for about 6 weeks and then the pain began to return.  Patient states the last injections did not seem to be as beneficial as the previous injections had been. Patient has increased pain with longer periods of weightbearing activity.  Patient reports aching and grinding pain in his bilateral knees with intermittent joint swelling and joint stiffness. Patient continues to take Norco 7.5 mg for chronic pain as prescribed by his pain management physician.  Patient is unable to take oral NSAIDs due to his current use of Eliquis.  Dr. Cedillo had previously tried to order viscosupplementation but the patient's insurance would not cover this.  No new complaints or concerns noted since last office visit.  No falls or injuries reported since last office visit.  Pain scale today is 7/10.    Physical Exam: 61 y.o. male  General  "Appearance:    Alert, cooperative, in no acute distress                   Vitals:    01/13/22 0922   Pulse: 51   SpO2: 97%   Weight: (!) 166 kg (365 lb)   Height: 172.7 cm (68\")   PainSc:   7        Patient is alert and oriented ×3. No acute distress.  Affect is normal. Respiratory rate is normal and unlabored.  Exam and complaints are unchanged.    Procedure:  Large Joint Arthrocentesis: R knee  Date/Time: 1/13/2022 9:18 AM  Consent given by: patient  Timeout: Immediately prior to procedure a time out was called to verify the correct patient, procedure, equipment, support staff and site/side marked as required   Supporting Documentation  Indications: pain, joint swelling and diagnostic evaluation   Procedure Details  Location: knee - R knee  Preparation: Patient was prepped and draped in the usual sterile fashion  Needle size: 22 G  Approach: anterolateral  Medications administered: 4 mL lidocaine 1 %; 80 mg triamcinolone acetonide 40 MG/ML  Patient tolerance: patient tolerated the procedure well with no immediate complications    Large Joint Arthrocentesis: L knee  Date/Time: 1/13/2022 9:18 AM  Consent given by: patient  Timeout: Immediately prior to procedure a time out was called to verify the correct patient, procedure, equipment, support staff and site/side marked as required   Supporting Documentation  Indications: pain, joint swelling and diagnostic evaluation   Procedure Details  Location: knee - L knee  Preparation: Patient was prepped and draped in the usual sterile fashion  Needle size: 22 G  Approach: anterolateral  Medications administered: 4 mL lidocaine PF 1% 1 %; 80 mg triamcinolone acetonide 40 MG/ML  Patient tolerance: patient tolerated the procedure well with no immediate complications      Assessment:    Diagnoses and all orders for this visit:    Chronic pain of both knees  -     Large Joint Arthrocentesis: R knee  -     Large Joint Arthrocentesis: L knee    Primary osteoarthritis of both " knees  -     Large Joint Arthrocentesis: R knee  -     Large Joint Arthrocentesis: L knee    Obesity, morbid, BMI 50 or higher ([facility])    Plan:   Patient complains of persistent/chronic bilateral knee pain due to end-stage osteoarthritic changes.  Patient typically gets good pain improvement for a few months at a time with previous intra-articular injections of steroid.  He does report today that the last injections did not seem to be as beneficial but did offer good pain improvement for about 6 weeks and then the pain began to return.  The patient has not sustained any falls or injuries. Recommend proceeding today with repeat intra-articular injections of steroid to the bilateral knees for management of joint pain/inflammation/swelling.  As previously noted, the patient was deemed to not be a surgical candidate and he was told that it was due to recurrent cellulitis in the lower extremities.  Also, patient's current BMI is 55.50 so he would not be a surgical candidate for elective knee arthroplasty at his current BMI.  Patient needs to focus on aggressive efforts for weight loss in an effort to improve his bilateral knee pain and take excessive pressure off of the lower extremity joints.  Weight loss would also offer a multitude of other health benefits.      Recommend the following:  -Rest and activity modification as tolerated and based on his pain.  -Continue with use of a cane or use of a walker for modified weightbearing.  -Patient can gradually progress his weightbearing and activity as pain allows.  -Continue with efforts for aggressive weight loss to improve his knee pain as well as his overall health.  -Elevation and ice therapy to the bilateral knees as needed to minimize pain/swelling/inflammation.     Patient already takes Norco 7.5 mg tablets for her chronic pain per pain management.  He can take some additional Tylenol as needed but should not exceed more than 4000 mg of acetaminophen in a 24-hour  period.  Patient should avoid oral NSAIDs due to his current use of Eliquis.      Follow-up in 3 months for recheck as needed for any new, worsening or persistent symptoms.    EMR Dragon/Transciption Disclaimer: Some of this note may be an electronic transcription/translation of spoken language to printed text.  The electronic translation of spoken language may permit erroneous, or at times, nonsensical words or phrases to be inadvertently transcribed. Although I have reviewed the note for such errors, some may still exist.     Return in about 3 months (around 4/13/2022), or if symptoms worsen or fail to improve, for Recheck.        This document has been electronically signed by TUNDE Joshi on January 13, 2022 12:49 CST      TUNDE Joshi

## 2022-02-08 ENCOUNTER — OFFICE VISIT (OUTPATIENT)
Dept: FAMILY MEDICINE CLINIC | Facility: CLINIC | Age: 62
End: 2022-02-08

## 2022-02-08 VITALS
WEIGHT: 315 LBS | HEIGHT: 68 IN | SYSTOLIC BLOOD PRESSURE: 124 MMHG | BODY MASS INDEX: 47.74 KG/M2 | DIASTOLIC BLOOD PRESSURE: 74 MMHG

## 2022-02-08 DIAGNOSIS — I10 ESSENTIAL HYPERTENSION, BENIGN: Chronic | ICD-10-CM

## 2022-02-08 DIAGNOSIS — L98.9 SKIN LESION OF FACE: ICD-10-CM

## 2022-02-08 DIAGNOSIS — Z79.01 CHRONIC ANTICOAGULATION: Chronic | ICD-10-CM

## 2022-02-08 DIAGNOSIS — E11.9 TYPE 2 DIABETES MELLITUS WITHOUT COMPLICATION, WITHOUT LONG-TERM CURRENT USE OF INSULIN: Primary | Chronic | ICD-10-CM

## 2022-02-08 DIAGNOSIS — E66.01 MORBID (SEVERE) OBESITY DUE TO EXCESS CALORIES: Chronic | ICD-10-CM

## 2022-02-08 LAB
EXPIRATION DATE: NORMAL
HBA1C MFR BLD: 5.9 %
Lab: 889

## 2022-02-08 PROCEDURE — 99214 OFFICE O/P EST MOD 30 MIN: CPT | Performed by: FAMILY MEDICINE

## 2022-02-08 PROCEDURE — 83036 HEMOGLOBIN GLYCOSYLATED A1C: CPT | Performed by: FAMILY MEDICINE

## 2022-02-08 NOTE — PROGRESS NOTES
Subjective   Uriah Alexandria Willingham is a 61 y.o. male.  Reevaluation type 2 diabetes morbid obesity mild hypertension chronic anticoagulation diagnoses below.  In interim just got over COVID about 2 weeks ago.  Therefore we will hold off on getting an pneumonia vaccine till next visit.  Is almost too late for a flu vaccine.  Has not had COVID booster-instructed to get same in about a week month or so.  Feels well otherwise has retired try and remain active A1c holding steady at 5.9.  His wife noted a nonhealing lesion of his left infraorbital area that needs to be looked at.  Last colon check 2014.    History of Present Illness   HPI    The following portions of the patient's history were reviewed and updated as appropriate: allergies, current medications, past family history, past medical history, past social history, past surgical history and problem list.    Review of Systems  Review of Systems   Constitutional: Negative for activity change, appetite change, fatigue and unexpected weight change.   HENT: Negative for trouble swallowing and voice change.    Eyes: Negative for redness and visual disturbance.   Respiratory: Negative for cough and wheezing.    Cardiovascular: Negative for chest pain and palpitations.   Gastrointestinal: Negative for abdominal pain, constipation, diarrhea, nausea and vomiting.   Genitourinary: Negative for urgency.   Musculoskeletal: Positive for arthralgias (Bilateral knees chronic). Negative for joint swelling.   Skin: Positive for color change (Face area).   Neurological: Negative for syncope and headaches.   Hematological: Negative for adenopathy.   Psychiatric/Behavioral: Negative for sleep disturbance.       Objective   Physical Exam  Physical Exam  Constitutional:       Appearance: He is well-developed.   HENT:      Head: Normocephalic.      Nose: Nose normal.   Eyes:      Pupils: Pupils are equal, round, and reactive to light.   Neck:      Thyroid: No thyromegaly.   Cardiovascular:  "     Rate and Rhythm: Normal rate and regular rhythm.      Heart sounds: Normal heart sounds. No murmur heard.  No friction rub. No gallop.    Pulmonary:      Breath sounds: Normal breath sounds.   Abdominal:      General: There is no distension.      Palpations: Abdomen is soft. There is no mass.      Tenderness: There is no abdominal tenderness.   Musculoskeletal:         General: Normal range of motion.      Cervical back: Normal range of motion.      Comments: Venous insufficiency 1+ pulses no skin breakdown previous history noted   Skin:     General: Skin is warm and dry.      Comments: Left infraorbital area cheek area of the face shows a slightly raised seborrheic looking lesion about 5 mm or so.  Underlying mild red nonhealing area with a couple small blood vessels.  Probably needs to have dermatology look at.   Neurological:      Mental Status: He is alert and oriented to person, place, and time.      Deep Tendon Reflexes: Reflexes are normal and symmetric.   Psychiatric:         Attention and Perception: Attention normal.         Mood and Affect: Mood normal.         Speech: Speech normal.         Behavior: Behavior normal.         Thought Content: Thought content normal.         Cognition and Memory: Cognition normal.       Results for orders placed or performed in visit on 02/08/22   POC Glycosylated Hemoglobin (Hb A1C)    Specimen: Blood   Result Value Ref Range    Hemoglobin A1C 5.9 %    Lot Number [id number]     Expiration Date 10/2,023            Visit Vitals  /74   Ht 172.7 cm (68\")   Wt (!) 166 kg (367 lb)   BMI 55.80 kg/m²     Body mass index is 55.8 kg/m².  /74   Ht 172.7 cm (68\")   Wt (!) 166 kg (367 lb)   BMI 55.80 kg/m² \      Assessment/Plan   Diagnoses and all orders for this visit:    1. Type 2 diabetes mellitus without complication, without long-term current use of insulin (HCC) (Primary)  -     POC Glycosylated Hemoglobin (Hb A1C)    2. Morbid (severe) obesity due to excess " calories (HCC)    3. Essential hypertension, benign    4. Chronic anticoagulation    5. Skin lesion of face  -     Ambulatory Referral to Dermatology     on wt loss measures and programs  Counseled mainly on lifestyle measures diet exercise salt restriction.  Counseled on vaccinations.  Orders for exam by dermatology.  Recheck again 4 months be time for A1c here in [place]

## 2022-02-18 DIAGNOSIS — E11.9 TYPE 2 DIABETES MELLITUS WITHOUT COMPLICATION, WITHOUT LONG-TERM CURRENT USE OF INSULIN: Chronic | ICD-10-CM

## 2022-03-22 ENCOUNTER — OFFICE VISIT (OUTPATIENT)
Dept: ORTHOPEDIC SURGERY | Facility: CLINIC | Age: 62
End: 2022-03-22

## 2022-03-22 VITALS — WEIGHT: 315 LBS | HEIGHT: 68 IN | BODY MASS INDEX: 47.74 KG/M2

## 2022-03-22 DIAGNOSIS — G89.29 CHRONIC PAIN OF BOTH KNEES: Primary | ICD-10-CM

## 2022-03-22 DIAGNOSIS — M25.561 CHRONIC PAIN OF BOTH KNEES: Primary | ICD-10-CM

## 2022-03-22 DIAGNOSIS — M17.0 PRIMARY OSTEOARTHRITIS OF BOTH KNEES: ICD-10-CM

## 2022-03-22 DIAGNOSIS — M25.562 CHRONIC PAIN OF BOTH KNEES: Primary | ICD-10-CM

## 2022-03-22 DIAGNOSIS — E66.01 OBESITY, MORBID, BMI 50 OR HIGHER: ICD-10-CM

## 2022-03-22 PROCEDURE — 99213 OFFICE O/P EST LOW 20 MIN: CPT | Performed by: NURSE PRACTITIONER

## 2022-03-22 PROCEDURE — 20610 DRAIN/INJ JOINT/BURSA W/O US: CPT | Performed by: NURSE PRACTITIONER

## 2022-03-22 RX ADMIN — TRIAMCINOLONE ACETONIDE 80 MG: 40 INJECTION, SUSPENSION INTRA-ARTICULAR; INTRAMUSCULAR at 08:21

## 2022-03-22 RX ADMIN — LIDOCAINE HYDROCHLORIDE 4 ML: 10 INJECTION, SOLUTION INFILTRATION; PERINEURAL at 08:21

## 2022-03-22 NOTE — PROGRESS NOTES
Uriah Willingham is a 61 y.o. male returns for     Chief Complaint   Patient presents with   • Left Knee - Follow-up, Pain   • Right Knee - Follow-up, Pain     HISTORY OF PRESENT ILLNESS: Patient presents to office for follow-up of chronic bilateral knee pain due to end-stage osteoarthritis.  Patient has experienced ongoing and progressively worsening bilateral knee pain over the course of several years.  He typically has worse pain in the right knee.  Patient has been treated conservatively with multiple previous intra-articular injections of steroid, intramuscular injections of steroid, intramuscular injections of Toradol and use of a cane for modified weightbearing.  Patient had previously discussed his candidacy for knee arthroplasty with Dr. Cedillo and was told he was not a surgical candidate due to recurrent cellulitis in his lower legs and his elevated BMI.  Last injections were given on 1/13/2022, which offered good pain improvement but only for about 6 weeks and then the pain began to return.  Patient continues to report aching and grinding pain in his bilateral knees that worsens with longer periods of weightbearing activity.  Patient complains of intermittent joint swelling and joint stiffness.  Patient continues to take Norco 7.5 mg for chronic pain as prescribed by his pain management physician.  Patient is unable to take oral NSAIDs due to his current use of Eliquis. Dr. Cedillo had previously tried to order viscosupplementation but the patient's insurance would not cover this.  No new complaints or concerns noted since last office visit.  No falls or injuries reported since last office visit. Pain scale today is 6/10.     CONCURRENT MEDICAL HISTORY:    The following portions of the patient's history were reviewed and updated as appropriate: allergies, current medications, past family history, past medical history, past social history, past surgical history and problem list.     ROS  No fevers  "or chills.  No chest pain or shortness of air.  No GI or  disturbances.  Bilateral knee pain.    PHYSICAL EXAMINATION:       Ht 172.7 cm (68\")   Wt (!) 161 kg (356 lb)   BMI 54.13 kg/m²     Physical Exam  Vitals reviewed.   Constitutional:       General: He is not in acute distress.     Appearance: He is well-developed. He is obese. He is not ill-appearing.   HENT:      Head: Normocephalic.   Pulmonary:      Effort: Pulmonary effort is normal. No respiratory distress.   Abdominal:      General: There is no distension.      Palpations: Abdomen is soft.   Musculoskeletal:         General: Swelling (Mild, bilateral knees) and tenderness (Mild, bilateral knees) present. No deformity or signs of injury.      Right knee: No effusion.      Left knee: No effusion.   Skin:     General: Skin is warm and dry.      Capillary Refill: Capillary refill takes less than 2 seconds.      Findings: No erythema.   Neurological:      Mental Status: He is alert and oriented to person, place, and time.      GCS: GCS eye subscore is 4. GCS verbal subscore is 5. GCS motor subscore is 6.   Psychiatric:         Speech: Speech normal.         Behavior: Behavior normal.         Thought Content: Thought content normal.         Judgment: Judgment normal.         GAIT:     []  Normal  [x]  Antalgic    Assistive device: []  None  []  Walker     []  Crutches  [x]  Cane     []  Wheelchair  []  Stretcher    Right Knee Exam     Tenderness   The patient is experiencing tenderness in the lateral joint line and medial joint line (Mild, diffuse).    Range of Motion   Extension: 5   Flexion: 100     Tests   Varus: negative Valgus: negative    Other   Erythema: absent  Sensation: normal  Pulse: present  Swelling: mild  Effusion: no effusion present      Left Knee Exam     Tenderness   The patient is experiencing tenderness in the medial joint line (Mild, diffuse).    Range of Motion   Extension: 5   Flexion: 110     Tests   Varus: negative Valgus: " negative    Other   Erythema: absent  Sensation: normal  Pulse: present  Swelling: mild  Effusion: no effusion present            XR Knee Bilateral AP Standing    Result Date: 3/23/2022  Narrative: Procedure: Bilateral AP standing knee views. Reason for exam: Bilateral knee pain. FINDINGS: Right knee severe loss of medial and lateral knee joint compartment height with bone appearing to oppose bone. This would be suspicious for severe osteoarthritic changes. Otherwise bony structures of the right knee are normal. There is no evidence of fracture or dislocation. Soft tissue structures are normal. Left knee severe loss of medial knee joint compartment height with bone appearing to oppose bone. This would be suspicious for severe osteoarthritic changes. Otherwise bony structures of the left knee are normal. There is no evidence of fracture or dislocation. Soft tissue structures are normal.     Impression: 1.  Severe bilateral knee osteoarthritic changes as described above. 2.  No acute bilateral knee abnormality. Electronically signed by:  Case Avendano MD  3/23/2022 7:51 AM CDT Workstation: [id number]        ASSESSMENT:    Diagnoses and all orders for this visit:    Chronic pain of both knees  -     Large Joint Arthrocentesis: R knee  -     Large Joint Arthrocentesis: L knee    Primary osteoarthritis of both knees  -     Large Joint Arthrocentesis: R knee  -     Large Joint Arthrocentesis: L knee    Obesity, morbid, BMI 50 or higher (HCC)    PLAN    Large Joint Arthrocentesis: R knee  Date/Time: 3/22/2022 8:21 AM  Consent given by: patient  Timeout: Immediately prior to procedure a time out was called to verify the correct patient, procedure, equipment, support staff and site/side marked as required   Supporting Documentation  Indications: pain, joint swelling and diagnostic evaluation   Procedure Details  Location: knee - R knee  Preparation: Patient was prepped and draped in the usual sterile fashion  Needle size: 22  G  Approach: anterolateral  Medications administered: 80 mg triamcinolone acetonide 40 MG/ML; 4 mL lidocaine 1 %  Patient tolerance: patient tolerated the procedure well with no immediate complications    Large Joint Arthrocentesis: L knee  Date/Time: 3/22/2022 8:21 AM  Consent given by: patient  Timeout: Immediately prior to procedure a time out was called to verify the correct patient, procedure, equipment, support staff and site/side marked as required   Supporting Documentation  Indications: pain, joint swelling and diagnostic evaluation   Procedure Details  Location: knee - L knee  Preparation: Patient was prepped and draped in the usual sterile fashion  Needle size: 22 G  Approach: anterolateral  Medications administered: 80 mg triamcinolone acetonide 40 MG/ML; 4 mL lidocaine 1 %  Patient tolerance: patient tolerated the procedure well with no immediate complications      AP standing and lateral x-rays of the bilateral knees repeated in office today and reviewed with no acute findings noted.  Patient has advanced degenerative changes consistent with end-stage osteoarthritis in both knee joints, more pronounced in the right knee.  Patient has bone-on-bone articulation in both the medial and lateral aspects of the right knee and in the medial aspect of the left knee.  Patient has experienced ongoing and progressively worsening bilateral knee pain for several years.  Patient has been treated conservatively as described in the HPI above and he is here today as he wants to proceed with repeat injections in an effort to improve his pain.  As previously noted, the patient was deemed to not be a surgical candidate due to recurrent cellulitis in the lower extremities.  Also, patient's current BMI is 54.13 so he would not be a surgical candidate for elective knee arthroplasty at his current BMI.  Recommend proceeding today with repeat intra-articular injections of steroid to the bilateral knees for management of joint  pain/inflammation/swelling.    Recommend the following:  -Rest and activity modification as tolerated and based on his pain.  -Continue with use of a cane or use of a walker for modified weightbearing.  -Patient can gradually progress his weightbearing and activity as pain allows.  -Continue with efforts for aggressive weight loss to improve his knee pain as well as his overall health.  -Elevation and ice therapy to the bilateral knees as needed to minimize pain/swelling/inflammation.  -Continue with efforts for weight loss to improve his chronic bilateral knee pain by taking excessive pressure off of the lower extremities.     Patient already takes Norco 7.5 mg tablets for chronic pain per pain management.  He can take some additional Tylenol as needed but should not exceed more than 4000 mg of acetaminophen in a 24-hour period.  Patient should avoid oral NSAIDs due to his current use of Eliquis.      Follow-up in 3 months for recheck as needed for any new, worsening or persistent symptoms.    Time spent of a minimum of 20 minutes including the face to face evaluation, reviewing of medical history and prior medial records, reviewing of diagnostic studies, documentation, patient education and coordination of care.     EMR Dragon/Transciption Disclaimer: Some of this note may be an electronic transcription/translation of spoken language to printed text.  The electronic translation of spoken language may permit erroneous, or at times, nonsensical words or phrases to be inadvertently transcribed. Although I have reviewed the note for such errors, some may still exist.     Return in about 3 months (around 6/22/2022), or if symptoms worsen or fail to improve, for Recheck.        This document has been electronically signed by TUNDE Joshi on March 23, 2022 07:56 CDT      TUNDE Joshi

## 2022-03-23 RX ORDER — TRIAMCINOLONE ACETONIDE 40 MG/ML
80 INJECTION, SUSPENSION INTRA-ARTICULAR; INTRAMUSCULAR
Status: COMPLETED | OUTPATIENT
Start: 2022-03-22 | End: 2022-03-22

## 2022-03-23 RX ORDER — LIDOCAINE HYDROCHLORIDE 10 MG/ML
4 INJECTION, SOLUTION INFILTRATION; PERINEURAL
Status: COMPLETED | OUTPATIENT
Start: 2022-03-22 | End: 2022-03-22

## 2022-04-05 DIAGNOSIS — I26.99 RECURRENT PULMONARY EMBOLI: ICD-10-CM

## 2022-04-05 RX ORDER — APIXABAN 5 MG/1
TABLET, FILM COATED ORAL
Qty: 60 TABLET | Refills: 2 | Status: SHIPPED | OUTPATIENT
Start: 2022-04-05 | End: 2022-07-01

## 2022-06-09 ENCOUNTER — OFFICE VISIT (OUTPATIENT)
Dept: FAMILY MEDICINE CLINIC | Facility: CLINIC | Age: 62
End: 2022-06-09

## 2022-06-09 VITALS
BODY MASS INDEX: 47.74 KG/M2 | HEIGHT: 68 IN | SYSTOLIC BLOOD PRESSURE: 124 MMHG | WEIGHT: 315 LBS | DIASTOLIC BLOOD PRESSURE: 74 MMHG

## 2022-06-09 DIAGNOSIS — I87.2 VENOUS INSUFFICIENCY: Chronic | ICD-10-CM

## 2022-06-09 DIAGNOSIS — Z79.01 CHRONIC ANTICOAGULATION: Chronic | ICD-10-CM

## 2022-06-09 DIAGNOSIS — Z12.5 SCREENING PSA (PROSTATE SPECIFIC ANTIGEN): ICD-10-CM

## 2022-06-09 DIAGNOSIS — L30.4 INTERTRIGO: ICD-10-CM

## 2022-06-09 DIAGNOSIS — I26.99 RECURRENT PULMONARY EMBOLI: Chronic | ICD-10-CM

## 2022-06-09 DIAGNOSIS — E66.01 MORBID (SEVERE) OBESITY DUE TO EXCESS CALORIES: Chronic | ICD-10-CM

## 2022-06-09 DIAGNOSIS — I10 ESSENTIAL HYPERTENSION, BENIGN: Chronic | ICD-10-CM

## 2022-06-09 DIAGNOSIS — E11.9 TYPE 2 DIABETES MELLITUS WITHOUT COMPLICATION, WITHOUT LONG-TERM CURRENT USE OF INSULIN: Primary | Chronic | ICD-10-CM

## 2022-06-09 DIAGNOSIS — M17.0 PRIMARY OSTEOARTHRITIS OF BOTH KNEES: Chronic | ICD-10-CM

## 2022-06-09 LAB
EXPIRATION DATE: NORMAL
HBA1C MFR BLD: 5.7 %
Lab: 902

## 2022-06-09 PROCEDURE — 99214 OFFICE O/P EST MOD 30 MIN: CPT | Performed by: FAMILY MEDICINE

## 2022-06-09 PROCEDURE — 83036 HEMOGLOBIN GLYCOSYLATED A1C: CPT | Performed by: FAMILY MEDICINE

## 2022-06-09 RX ORDER — NYSTATIN 100000 U/G
CREAM TOPICAL
Qty: 60 G | Refills: 5 | Status: SHIPPED | OUTPATIENT
Start: 2022-06-09

## 2022-06-09 NOTE — PROGRESS NOTES
Subjective   Uriah Willingham is a 61 y.o. male.  Reevaluation morbid obesity hypertension type 2 diabetes recurrent pulmonary embolism chronic knee pain diagnoses below in the interim is lost his compression stockings are written for same.  Has developed intertrigo panniculus fold needs medicine for same.  Continues to get shots in his knees.  A1c holding at 5.7.  Medicines have remained of the same.  History noted.    History of Present Illness   HPI    The following portions of the patient's history were reviewed and updated as appropriate: allergies, current medications, past family history, past medical history, past social history, past surgical history and problem list.    Review of Systems  Review of Systems   Constitutional: Negative for activity change, appetite change, fatigue and unexpected weight change.   HENT: Negative for trouble swallowing and voice change.    Eyes: Negative for redness and visual disturbance.   Respiratory: Negative for cough and wheezing.    Cardiovascular: Negative for chest pain and palpitations. Leg swelling: Chronic.   Gastrointestinal: Negative for abdominal pain, constipation, diarrhea, nausea and vomiting.   Genitourinary: Negative for urgency.   Musculoskeletal: Positive for arthralgias (Chronic), back pain and gait problem. Negative for joint swelling.   Neurological: Negative for syncope and headaches.   Hematological: Negative for adenopathy.   Psychiatric/Behavioral: Negative for sleep disturbance.       Objective   Physical Exam  Physical Exam  Constitutional:       Appearance: He is well-developed.   HENT:      Head: Normocephalic.   Eyes:      Pupils: Pupils are equal, round, and reactive to light.   Neck:      Thyroid: No thyromegaly.   Cardiovascular:      Rate and Rhythm: Normal rate and regular rhythm.      Heart sounds: Normal heart sounds. No murmur heard.    No friction rub. No gallop.   Pulmonary:      Breath sounds: Normal breath sounds.   Abdominal:       "General: There is no distension.      Palpations: Abdomen is soft. There is no mass.      Tenderness: There is no abdominal tenderness.   Musculoskeletal:         General: Normal range of motion.      Cervical back: Normal range of motion.      Comments: Marked venous insufficiency as mentioned no skin breakdown   Skin:     General: Skin is warm and dry.      Comments: Chronic venous changes lower.  Mild intertrigo pannicular folds   Neurological:      Mental Status: He is alert and oriented to person, place, and time.      Deep Tendon Reflexes: Reflexes are normal and symmetric.   Psychiatric:         Attention and Perception: Attention normal.         Mood and Affect: Mood normal.         Speech: Speech normal.         Behavior: Behavior normal.         Thought Content: Thought content normal.         Cognition and Memory: Cognition normal.       Results for orders placed or performed in visit on 06/09/22   POC Glycosylated Hemoglobin (Hb A1C)    Specimen: Blood   Result Value Ref Range    Hemoglobin A1C 5.7 %    Lot Number [id number]     Expiration Date 11/2,023          Visit Vitals  /74   Ht 172.7 cm (68\")   Wt (!) 168 kg (369 lb 6.4 oz)   BMI 56.17 kg/m²     Body mass index is 56.17 kg/m².  /74   Ht 172.7 cm (68\")   Wt (!) 168 kg (369 lb 6.4 oz)   BMI 56.17 kg/m²       Assessment/Plan   Diagnoses and all orders for this visit:    1. Type 2 diabetes mellitus without complication, without long-term current use of insulin (HCC) (Primary)  -     POC Glycosylated Hemoglobin (Hb A1C)  -     Comprehensive Metabolic Panel; Future  -     Hemoglobin A1c; Future  -     Lipid Panel With LDL / HDL Ratio; Future  -     Magnesium; Future  -     MicroAlbumin, Urine, Random - Urine, Clean Catch; Future    2. Essential hypertension, benign  -     Comprehensive Metabolic Panel; Future  -     Magnesium; Future    3. Chronic anticoagulation  -     CBC (No Diff); Future  -     Compression Stockings    4. Primary " osteoarthritis of both knees    5. Screening PSA (prostate specific antigen)  -     PSA Screen; Future    6. Venous insufficiency  -     Compression Stockings    7. Intertrigo  -     nystatin (MYCOSTATIN) [id number] UNIT/GM cream; Apply twice daily to affected areas for 2 weeks and as needed  Dispense: 60 g; Refill: 5    8. Morbid (severe) obesity due to excess calories (HCC)  -     Compression Stockings    9. Recurrent pulmonary emboli (HCC)  -     Compression Stockings     on wt loss measures and programs  Counseled on lifestyle measures counseled skin care dryness of medicine for intertrigo.  Rewritten for stockings.  Lifestyle measures discussed recheck again 4 months all lab prior

## 2022-06-24 ENCOUNTER — OFFICE VISIT (OUTPATIENT)
Dept: ORTHOPEDIC SURGERY | Facility: CLINIC | Age: 62
End: 2022-06-24

## 2022-06-24 VITALS — BODY MASS INDEX: 47.74 KG/M2 | WEIGHT: 315 LBS | HEIGHT: 68 IN

## 2022-06-24 DIAGNOSIS — E66.01 OBESITY, MORBID, BMI 50 OR HIGHER: ICD-10-CM

## 2022-06-24 DIAGNOSIS — G89.29 CHRONIC PAIN OF BOTH KNEES: Primary | ICD-10-CM

## 2022-06-24 DIAGNOSIS — M17.0 PRIMARY OSTEOARTHRITIS OF BOTH KNEES: ICD-10-CM

## 2022-06-24 DIAGNOSIS — M25.562 CHRONIC PAIN OF BOTH KNEES: Primary | ICD-10-CM

## 2022-06-24 DIAGNOSIS — M25.561 CHRONIC PAIN OF BOTH KNEES: Primary | ICD-10-CM

## 2022-06-24 PROCEDURE — 20610 DRAIN/INJ JOINT/BURSA W/O US: CPT | Performed by: NURSE PRACTITIONER

## 2022-06-24 RX ORDER — TRIAMCINOLONE ACETONIDE 40 MG/ML
80 INJECTION, SUSPENSION INTRA-ARTICULAR; INTRAMUSCULAR
Status: COMPLETED | OUTPATIENT
Start: 2022-06-24 | End: 2022-06-24

## 2022-06-24 RX ORDER — BUPIVACAINE HYDROCHLORIDE 5 MG/ML
4 INJECTION, SOLUTION PERINEURAL
Status: COMPLETED | OUTPATIENT
Start: 2022-06-24 | End: 2022-06-24

## 2022-06-24 RX ADMIN — TRIAMCINOLONE ACETONIDE 80 MG: 40 INJECTION, SUSPENSION INTRA-ARTICULAR; INTRAMUSCULAR at 13:53

## 2022-06-24 RX ADMIN — BUPIVACAINE HYDROCHLORIDE 4 ML: 5 INJECTION, SOLUTION PERINEURAL at 13:53

## 2022-06-24 NOTE — PROGRESS NOTES
Uriah Willingham is a 61 y.o. male returns for     Chief Complaint   Patient presents with   • Left Knee - Follow-up, Pain   • Right Knee - Follow-up, Pain     HISTORY OF PRESENT ILLNESS: Patient presents to office for follow-up of chronic bilateral knee pain due to end-stage osteoarthritis.  Patient has experienced ongoing and progressively worsening bilateral knee pain over the course of several years.  He typically has worse pain in the right knee.  Patient has been treated conservatively with multiple previous intra-articular injections of steroid, intramuscular injections of steroid, intramuscular injections of Toradol and use of a cane for modified weightbearing.  Patient had previously discussed his candidacy for knee arthroplasty with Dr. Cedillo and was told he was not a surgical candidate due to recurrent cellulitis in his lower legs and his elevated BMI.  Last injections were given on 3/22/2022, which offered good pain improvement but the pain relief only lasts approximately 6 to 8 weeks and then it begins to return.  Patient states the pain has been much worse in the past 2 weeks.  The patient continues to experience aching and grinding pain in the bilateral knee joints that worsens with longer periods of weightbearing activity.  The patient has also continued to experience intermittent joint swelling and joint stiffness.  Patient continues to take Norco 7.5 mg for chronic pain as prescribed by his pain management physician.  Patient is unable to take oral NSAIDs due to his current use of Eliquis. Dr. Cedillo had previously tried to order viscosupplementation but the patient's insurance would not cover this.  No new complaints or concerns noted since last office visit.  No falls or injuries reported since last office visit.  Pain scale today is 6/10.     CONCURRENT MEDICAL HISTORY:    The following portions of the patient's history were reviewed and updated as appropriate: allergies, current  "medications, past family history, past medical history, past social history, past surgical history and problem list.     ROS  No fevers or chills.  No chest pain or shortness of air.  No GI or  disturbances.  Bilateral knee pain.    PHYSICAL EXAMINATION:       Ht 172.7 cm (68\")   Wt (!) 167 kg (369 lb)   BMI 56.11 kg/m²     Physical Exam  Vitals reviewed.   Constitutional:       General: He is not in acute distress.     Appearance: He is well-developed. He is obese. He is not ill-appearing.   HENT:      Head: Normocephalic.   Pulmonary:      Effort: Pulmonary effort is normal. No respiratory distress.   Abdominal:      General: There is no distension.      Palpations: Abdomen is soft.   Musculoskeletal:         General: Swelling (Mild, bilateral knees) and tenderness (Mild, bilateral knees) present. No deformity or signs of injury.      Right knee: No effusion.      Left knee: No effusion.   Skin:     General: Skin is warm and dry.      Capillary Refill: Capillary refill takes less than 2 seconds.      Findings: No erythema.   Neurological:      Mental Status: He is alert and oriented to person, place, and time.      GCS: GCS eye subscore is 4. GCS verbal subscore is 5. GCS motor subscore is 6.   Psychiatric:         Speech: Speech normal.         Behavior: Behavior normal.         Thought Content: Thought content normal.         Judgment: Judgment normal.         GAIT:     []  Normal  [x]  Antalgic    Assistive device: []  None  []  Walker     []  Crutches  [x]  Cane     []  Wheelchair  []  Stretcher    Right Knee Exam     Tenderness   The patient is experiencing tenderness in the lateral joint line and medial joint line (Mild, diffuse).    Range of Motion   Extension: 5   Flexion: 100     Tests   Varus: negative Valgus: negative    Other   Erythema: absent  Sensation: normal  Pulse: present  Swelling: mild  Effusion: no effusion present      Left Knee Exam     Tenderness   The patient is experiencing " tenderness in the medial joint line (Mild, diffuse).    Range of Motion   Extension: 5   Flexion: 110     Tests   Varus: negative Valgus: negative    Other   Erythema: absent  Sensation: normal  Pulse: present  Swelling: mild  Effusion: no effusion present              Procedure: Bilateral AP standing knee views.     Reason for exam: Bilateral knee pain.     FINDINGS: Right knee severe loss of medial and lateral knee joint  compartment height with bone appearing to oppose bone. This would  be suspicious for severe osteoarthritic changes. Otherwise bony  structures of the right knee are normal. There is no evidence of  fracture or dislocation. Soft tissue structures are normal.     Left knee severe loss of medial knee joint compartment height  with bone appearing to oppose bone. This would be suspicious for  severe osteoarthritic changes. Otherwise bony structures of the  left knee are normal. There is no evidence of fracture or  dislocation. Soft tissue structures are normal.     IMPRESSION:  1.  Severe bilateral knee osteoarthritic changes as described  above.  2.  No acute bilateral knee abnormality.     Electronically signed by:  Case Avendano MD  3/23/2022 7:51 AM CDT  Workstation: [id number]    Procedure: Bilateral knee lateral view only.     Reason for exam: Knee pain.     FINDINGS: Loss of bilateral knee knee joint compartment heights  better described on the AP standing views suspicious for severe  osteoarthritic changes. Otherwise bony structures of bilateral  knees are normal. There is no evidence of fracture or  dislocation. Soft tissue structures are normal.     IMPRESSION:  1.  Loss of bilateral knee knee joint compartment heights better  described on the AP standing views suspicious for severe  osteoarthritic changes.   2.  No acute bilateral lateral knee abnormality.     Electronically signed by:  Case Avendano MD  3/23/2022 7:54 AM CDT  Workstation: [id number]      ASSESSMENT:    Diagnoses and all  orders for this visit:    Chronic pain of both knees  -     Large Joint Arthrocentesis: R knee  -     Large Joint Arthrocentesis: L knee    Primary osteoarthritis of both knees  -     Large Joint Arthrocentesis: R knee  -     Large Joint Arthrocentesis: L knee    Obesity, morbid, BMI 50 or higher (HCC)    PLAN    Large Joint Arthrocentesis: R knee  Date/Time: 6/24/2022 1:53 PM  Consent given by: patient  Timeout: Immediately prior to procedure a time out was called to verify the correct patient, procedure, equipment, support staff and site/side marked as required   Supporting Documentation  Indications: pain, joint swelling and diagnostic evaluation   Procedure Details  Location: knee - R knee  Preparation: Patient was prepped and draped in the usual sterile fashion  Needle size: 22 G  Approach: anterolateral  Medications administered: 4 mL bupivacaine 0.5 %; 80 mg triamcinolone acetonide 40 MG/ML  Patient tolerance: patient tolerated the procedure well with no immediate complications    Large Joint Arthrocentesis: L knee  Date/Time: 6/24/2022 1:53 PM  Consent given by: patient  Timeout: Immediately prior to procedure a time out was called to verify the correct patient, procedure, equipment, support staff and site/side marked as required   Supporting Documentation  Indications: pain, joint swelling and diagnostic evaluation   Procedure Details  Location: knee - L knee  Preparation: Patient was prepped and draped in the usual sterile fashion  Needle size: 22 G  Approach: anterolateral  Medications administered: 4 mL bupivacaine 0.5 %; 80 mg triamcinolone acetonide 40 MG/ML  Patient tolerance: patient tolerated the procedure well with no immediate complications      Patient is doing well overall but continues to experience chronic/persistent bilateral knee pain due to known end-stage osteoarthritic changes in both knee joints, more pronounced in the right knee.  The patient has been treated conservatively as described  in the HPI above and he is here today because he wants to proceed with repeat injections in an effort to improve his pain.  As previously noted, the patient was deemed to not be a surgical candidate due to his elevated BMI and history of recurrent cellulitis in the lower extremities, which continues to be a chronic issue.  There are no signs of infection and no signs of cellulitis anywhere near the knee joints.  Recommend proceeding today with repeat intra-articular injections of steroid to the bilateral knees for management of joint pain/inflammation/swelling.    Recommend the following:  -Rest and activity modification as tolerated and based on his pain.  -Continue with use of a cane or use of a walker for modified weightbearing.  -Patient can gradually progress his weightbearing and activity as pain allows.  -Continue with efforts for aggressive weight loss to improve his knee pain as well as his overall health.  -Elevation and ice therapy to the bilateral knees as needed to minimize pain/swelling/inflammation.  -Continue with efforts for weight loss to improve his chronic bilateral knee pain by taking excessive pressure off of the lower extremities.     Patient already takes Norco 7.5 mg tablets for chronic pain per pain management.  He can take some additional Tylenol as needed but should not exceed more than 4000 mg of acetaminophen in a 24-hour period.  Patient should avoid oral NSAIDs due to his current use of Eliquis.      Follow-up in 3 months for recheck as needed for any new, worsening or persistent symptoms. Follow up sooner as needed for any new, worsening or persistent symptoms.     EMR Dragon/Transciption Disclaimer: Some of this note may be an electronic transcription/translation of spoken language to printed text using the Dragon Dictation System.     Return in about 3 months (around 9/24/2022), or if symptoms worsen or fail to improve, for Recheck.        This document has been electronically  signed by TUNDE Joshi on June 24, 2022 15:45 CDT      TUNDE Joshi

## 2022-07-01 DIAGNOSIS — I26.99 RECURRENT PULMONARY EMBOLI: ICD-10-CM

## 2022-07-01 RX ORDER — APIXABAN 5 MG/1
TABLET, FILM COATED ORAL
Qty: 180 TABLET | Refills: 3 | Status: SHIPPED | OUTPATIENT
Start: 2022-07-01

## 2022-08-25 ENCOUNTER — OFFICE VISIT (OUTPATIENT)
Dept: ORTHOPEDIC SURGERY | Facility: CLINIC | Age: 62
End: 2022-08-25

## 2022-08-25 VITALS — WEIGHT: 315 LBS | BODY MASS INDEX: 47.74 KG/M2 | HEIGHT: 68 IN

## 2022-08-25 DIAGNOSIS — M25.561 CHRONIC PAIN OF BOTH KNEES: Primary | ICD-10-CM

## 2022-08-25 DIAGNOSIS — M25.562 CHRONIC PAIN OF BOTH KNEES: Primary | ICD-10-CM

## 2022-08-25 DIAGNOSIS — M17.0 PRIMARY OSTEOARTHRITIS OF BOTH KNEES: ICD-10-CM

## 2022-08-25 DIAGNOSIS — G89.29 CHRONIC PAIN OF BOTH KNEES: Primary | ICD-10-CM

## 2022-08-25 DIAGNOSIS — E66.01 OBESITY, MORBID, BMI 50 OR HIGHER: ICD-10-CM

## 2022-08-25 PROCEDURE — 20610 DRAIN/INJ JOINT/BURSA W/O US: CPT | Performed by: NURSE PRACTITIONER

## 2022-08-25 RX ORDER — BUPIVACAINE HYDROCHLORIDE 5 MG/ML
4 INJECTION, SOLUTION PERINEURAL
Status: COMPLETED | OUTPATIENT
Start: 2022-08-25 | End: 2022-08-25

## 2022-08-25 RX ORDER — TRIAMCINOLONE ACETONIDE 40 MG/ML
80 INJECTION, SUSPENSION INTRA-ARTICULAR; INTRAMUSCULAR
Status: COMPLETED | OUTPATIENT
Start: 2022-08-25 | End: 2022-08-25

## 2022-08-25 RX ADMIN — TRIAMCINOLONE ACETONIDE 80 MG: 40 INJECTION, SUSPENSION INTRA-ARTICULAR; INTRAMUSCULAR at 10:30

## 2022-08-25 RX ADMIN — BUPIVACAINE HYDROCHLORIDE 4 ML: 5 INJECTION, SOLUTION PERINEURAL at 10:30

## 2022-08-25 NOTE — PROGRESS NOTES
Uriah Willingham is a 61 y.o. male returns for     Chief Complaint   Patient presents with   • Left Knee - Follow-up, Pain   • Right Knee - Follow-up, Pain     HISTORY OF PRESENT ILLNESS: Patient presents to office for follow-up of chronic bilateral knee pain due to end-stage osteoarthritis.  Patient has experienced ongoing and progressively worsening bilateral knee pain over the course of several years.  He typically has worse pain in the right knee.  Patient has been treated conservatively with multiple previous intra-articular injections of steroid, intramuscular injections of steroid, intramuscular injections of Toradol and use of a cane for modified weightbearing.  Patient had previously discussed his candidacy for knee arthroplasty with Dr. Cedillo and was told he was not a surgical candidate due to recurrent cellulitis in his lower legs and his elevated BMI.  Last injections were given on 6/24/2022, which offered some temporary pain improvement.  The patient continues to experience aching and grinding pain in the bilateral knee joints that worsens with longer periods of weightbearing activity.  The patient has also continued to experience intermittent joint swelling and joint stiffness.  Patient continues to take Norco 7.5 mg for chronic pain as prescribed by his pain management physician.  Patient is unable to take oral NSAIDs due to his current use of Eliquis. Dr. Cedillo had previously tried to order viscosupplementation but the patient's insurance would not cover this. No new complaints or concerns noted since last office visit.  No falls or injuries reported since last office visit.  Pain scale today is 6/10.     CONCURRENT MEDICAL HISTORY:    The following portions of the patient's history were reviewed and updated as appropriate: allergies, current medications, past family history, past medical history, past social history, past surgical history and problem list.     ROS  No fevers or chills.   "No chest pain or shortness of air.  No GI or  disturbances.  Bilateral knee pain.    PHYSICAL EXAMINATION:       Ht 172.7 cm (68\")   Wt (!) 166 kg (365 lb)   BMI 55.50 kg/m²     Physical Exam  Vitals reviewed.   Constitutional:       Appearance: He is well-developed. He is obese.   HENT:      Head: Normocephalic.   Pulmonary:      Effort: Pulmonary effort is normal. No respiratory distress.   Abdominal:      General: There is no distension.      Palpations: Abdomen is soft.   Musculoskeletal:         General: Swelling (Mild, bilateral knees) and tenderness (Mild, bilateral knees) present. No deformity or signs of injury.      Right knee: No effusion.      Left knee: No effusion.   Skin:     General: Skin is warm and dry.      Capillary Refill: Capillary refill takes less than 2 seconds.      Findings: No erythema.   Neurological:      Mental Status: He is alert and oriented to person, place, and time.      GCS: GCS eye subscore is 4. GCS verbal subscore is 5. GCS motor subscore is 6.   Psychiatric:         Speech: Speech normal.         Behavior: Behavior normal.         Thought Content: Thought content normal.         Judgment: Judgment normal.         GAIT:     []  Normal  [x]  Antalgic    Assistive device: []  None  []  Walker     []  Crutches  [x]  Cane     []  Wheelchair  []  Stretcher    Right Knee Exam     Tenderness   The patient is experiencing tenderness in the lateral joint line and medial joint line (Mild, diffuse).    Range of Motion   Extension: 5   Flexion: 100     Tests   Varus: negative Valgus: negative    Other   Erythema: absent  Sensation: normal  Pulse: present  Swelling: mild  Effusion: no effusion present      Left Knee Exam     Tenderness   The patient is experiencing tenderness in the medial joint line (Mild, diffuse).    Range of Motion   Extension: 5   Flexion: 110     Tests   Varus: negative Valgus: negative    Other   Erythema: absent  Sensation: normal  Pulse: present  Swelling: " mild  Effusion: no effusion present            Procedure: Bilateral AP standing knee views     Reason for exam: Bilateral knee pain.     FINDINGS: Right knee severe loss of medial and lateral knee joint  compartment height with bone appearing to oppose bone. This would  be suspicious for severe osteoarthritic changes. Otherwise bony  structures of the right knee are normal. There is no evidence of  fracture or dislocation. Soft tissue structures are normal.     Left knee severe loss of medial knee joint compartment height  with bone appearing to oppose bone. This would be suspicious for  severe osteoarthritic changes. Otherwise bony structures of the  left knee are normal. There is no evidence of fracture or  dislocation. Soft tissue structures are normal.     IMPRESSION:  1.  Severe bilateral knee osteoarthritic changes as described  above.  2.  No acute bilateral knee abnormality.     Electronically signed by:  Case Avendano MD  3/23/2022 7:51 AM CDT  Workstation: [id number]     Procedure: Bilateral knee lateral view only     Reason for exam: Knee pain.     FINDINGS: Loss of bilateral knee knee joint compartment heights  better described on the AP standing views suspicious for severe  osteoarthritic changes. Otherwise bony structures of bilateral  knees are normal. There is no evidence of fracture or  dislocation. Soft tissue structures are normal.     IMPRESSION:  1.  Loss of bilateral knee knee joint compartment heights better  described on the AP standing views suspicious for severe  osteoarthritic changes.   2.  No acute bilateral lateral knee abnormality.     Electronically signed by:  Case Avendano MD  3/23/2022 7:54 AM CDT  Workstation: [id number]      ASSESSMENT:    Diagnoses and all orders for this visit:    Chronic pain of both knees  -     Large Joint Arthrocentesis: R knee  -     Large Joint Arthrocentesis: L knee    Primary osteoarthritis of both knees  -     Large Joint Arthrocentesis: R knee  -      Large Joint Arthrocentesis: L knee    Obesity, morbid, BMI 50 or higher (HCC)    PLAN    Large Joint Arthrocentesis: R knee  Date/Time: 8/25/2022 10:30 AM  Consent given by: patient  Timeout: Immediately prior to procedure a time out was called to verify the correct patient, procedure, equipment, support staff and site/side marked as required   Supporting Documentation  Indications: pain, diagnostic evaluation and joint swelling   Procedure Details  Location: knee - R knee  Preparation: Patient was prepped and draped in the usual sterile fashion  Needle size: 22 G  Approach: anterolateral  Medications administered: 4 mL bupivacaine 0.5 %; 80 mg triamcinolone acetonide 40 MG/ML  Patient tolerance: patient tolerated the procedure well with no immediate complications    Large Joint Arthrocentesis: L knee  Date/Time: 8/25/2022 10:30 AM  Consent given by: patient  Timeout: Immediately prior to procedure a time out was called to verify the correct patient, procedure, equipment, support staff and site/side marked as required   Supporting Documentation  Indications: pain, joint swelling and diagnostic evaluation   Procedure Details  Location: knee - L knee  Preparation: Patient was prepped and draped in the usual sterile fashion  Needle size: 22 G  Approach: anterolateral  Medications administered: 4 mL bupivacaine 0.5 %; 80 mg triamcinolone acetonide 40 MG/ML  Patient tolerance: patient tolerated the procedure well with no immediate complications      Patient is doing well overall but continues to experience chronic/persistent bilateral knee pain due to known end-stage osteoarthritic changes in both knee joints, more pronounced in the right knee. Patient reports his left knee pain has actually been worse recently for unknown reasons. He has not sustained any falls or injuries. The patient has been treated conservatively as described in the HPI above and he is here today because he wants to proceed with repeat injections in  an effort to improve his pain.  As previously noted, the patient was deemed to not be a surgical candidate due to his elevated BMI and history of recurrent cellulitis in the lower extremities, which continues to be a chronic issue.  There are no signs of infection and no signs of cellulitis anywhere near the knee joints.  Recommend proceeding today with repeat intra-articular injections of steroid to the bilateral knees for management of joint pain/inflammation/swelling.    Recommend the following:  -Rest and activity modification as tolerated and based on his pain.  -Continue with use of a cane or use of a walker for modified weightbearing.  -Patient can gradually progress his weightbearing and activity as pain allows.  -Continue with efforts for aggressive weight loss to improve his knee pain as well as his overall health.  -Elevation and ice therapy to the bilateral knees as needed to minimize pain/swelling/inflammation.  -Continue with efforts for weight loss to improve his chronic bilateral knee pain by taking excessive pressure off of the lower extremities.     Patient already takes Norco 7.5 mg tablets for chronic pain per pain management.  He can take some additional Tylenol as needed but should not exceed more than 4000 mg of acetaminophen in a 24-hour period.  Patient should avoid oral NSAIDs due to his current use of Eliquis.      Follow-up in 8-12  weeks for recheck as needed for any new, worsening or persistent symptoms. Follow up sooner as needed for any new, worsening or persistent symptoms.     EMR Dragon/Transciption Disclaimer: Some of this note may be an electronic transcription/translation of spoken language to printed text using the Dragon Dictation System.     Return in about 8 weeks (around 10/20/2022), or if symptoms worsen or fail to improve, for Recheck.        This document has been electronically signed by TUNDE Joshi on August 25, 2022 12:26 CDT      TUNDE Joshi

## 2022-10-06 ENCOUNTER — LAB (OUTPATIENT)
Dept: LAB | Facility: HOSPITAL | Age: 62
End: 2022-10-06

## 2022-10-06 DIAGNOSIS — I10 ESSENTIAL HYPERTENSION, BENIGN: Chronic | ICD-10-CM

## 2022-10-06 DIAGNOSIS — Z12.5 SCREENING PSA (PROSTATE SPECIFIC ANTIGEN): ICD-10-CM

## 2022-10-06 DIAGNOSIS — E11.9 TYPE 2 DIABETES MELLITUS WITHOUT COMPLICATION, WITHOUT LONG-TERM CURRENT USE OF INSULIN: Chronic | ICD-10-CM

## 2022-10-06 DIAGNOSIS — Z79.01 CHRONIC ANTICOAGULATION: Chronic | ICD-10-CM

## 2022-10-06 PROCEDURE — 80061 LIPID PANEL: CPT

## 2022-10-06 PROCEDURE — 82043 UR ALBUMIN QUANTITATIVE: CPT

## 2022-10-06 PROCEDURE — G0103 PSA SCREENING: HCPCS

## 2022-10-06 PROCEDURE — 83735 ASSAY OF MAGNESIUM: CPT

## 2022-10-06 PROCEDURE — 85027 COMPLETE CBC AUTOMATED: CPT

## 2022-10-06 PROCEDURE — 36415 COLL VENOUS BLD VENIPUNCTURE: CPT

## 2022-10-06 PROCEDURE — 83036 HEMOGLOBIN GLYCOSYLATED A1C: CPT

## 2022-10-06 PROCEDURE — 80053 COMPREHEN METABOLIC PANEL: CPT

## 2022-10-07 LAB
ALBUMIN SERPL-MCNC: 4.1 G/DL (ref 3.5–5.2)
ALBUMIN UR-MCNC: 1.2 MG/DL
ALBUMIN/GLOB SERPL: 1.4 G/DL
ALP SERPL-CCNC: 43 U/L (ref 39–117)
ALT SERPL W P-5'-P-CCNC: 26 U/L (ref 1–41)
ANION GAP SERPL CALCULATED.3IONS-SCNC: 16.1 MMOL/L (ref 5–15)
AST SERPL-CCNC: 25 U/L (ref 1–40)
BILIRUB SERPL-MCNC: 1.6 MG/DL (ref 0–1.2)
BUN SERPL-MCNC: 16 MG/DL (ref 8–23)
BUN/CREAT SERPL: 14.4 (ref 7–25)
CALCIUM SPEC-SCNC: 9.8 MG/DL (ref 8.6–10.5)
CHLORIDE SERPL-SCNC: 100 MMOL/L (ref 98–107)
CHOLEST SERPL-MCNC: 187 MG/DL (ref 0–200)
CO2 SERPL-SCNC: 21.9 MMOL/L (ref 22–29)
CREAT SERPL-MCNC: 1.11 MG/DL (ref 0.76–1.27)
DEPRECATED RDW RBC AUTO: 40.2 FL (ref 37–54)
EGFRCR SERPLBLD CKD-EPI 2021: 76 ML/MIN/1.73
ERYTHROCYTE [DISTWIDTH] IN BLOOD BY AUTOMATED COUNT: 12.7 % (ref 12.3–15.4)
GLOBULIN UR ELPH-MCNC: 3 GM/DL
GLUCOSE SERPL-MCNC: 133 MG/DL (ref 65–99)
HBA1C MFR BLD: 6.1 % (ref 4.8–5.6)
HCT VFR BLD AUTO: 46.3 % (ref 37.5–51)
HDLC SERPL-MCNC: 44 MG/DL (ref 40–60)
HGB BLD-MCNC: 15.9 G/DL (ref 13–17.7)
LDLC SERPL CALC-MCNC: 118 MG/DL (ref 0–100)
LDLC/HDLC SERPL: 2.62 {RATIO}
MAGNESIUM SERPL-MCNC: 1.6 MG/DL (ref 1.6–2.4)
MCH RBC QN AUTO: 30.5 PG (ref 26.6–33)
MCHC RBC AUTO-ENTMCNC: 34.3 G/DL (ref 31.5–35.7)
MCV RBC AUTO: 88.7 FL (ref 79–97)
PLATELET # BLD AUTO: 238 10*3/MM3 (ref 140–450)
PMV BLD AUTO: 10.5 FL (ref 6–12)
POTASSIUM SERPL-SCNC: 4.1 MMOL/L (ref 3.5–5.2)
PROT SERPL-MCNC: 7.1 G/DL (ref 6–8.5)
PSA SERPL-MCNC: 1.09 NG/ML (ref 0–4)
RBC # BLD AUTO: 5.22 10*6/MM3 (ref 4.14–5.8)
SODIUM SERPL-SCNC: 138 MMOL/L (ref 136–145)
TRIGL SERPL-MCNC: 138 MG/DL (ref 0–150)
VLDLC SERPL-MCNC: 25 MG/DL (ref 5–40)
WBC NRBC COR # BLD: 7.68 10*3/MM3 (ref 3.4–10.8)

## 2022-10-10 ENCOUNTER — OFFICE VISIT (OUTPATIENT)
Dept: FAMILY MEDICINE CLINIC | Facility: CLINIC | Age: 62
End: 2022-10-10

## 2022-10-10 VITALS
DIASTOLIC BLOOD PRESSURE: 78 MMHG | WEIGHT: 315 LBS | HEIGHT: 68 IN | BODY MASS INDEX: 47.74 KG/M2 | SYSTOLIC BLOOD PRESSURE: 124 MMHG

## 2022-10-10 DIAGNOSIS — Z79.01 CHRONIC ANTICOAGULATION: Chronic | ICD-10-CM

## 2022-10-10 DIAGNOSIS — E66.01 MORBID (SEVERE) OBESITY DUE TO EXCESS CALORIES: Chronic | ICD-10-CM

## 2022-10-10 DIAGNOSIS — I10 ESSENTIAL HYPERTENSION, BENIGN: Chronic | ICD-10-CM

## 2022-10-10 DIAGNOSIS — E11.9 TYPE 2 DIABETES MELLITUS WITHOUT COMPLICATION, WITHOUT LONG-TERM CURRENT USE OF INSULIN: Primary | Chronic | ICD-10-CM

## 2022-10-10 DIAGNOSIS — E66.01 OBESITY, MORBID, BMI 50 OR HIGHER: Chronic | ICD-10-CM

## 2022-10-10 PROCEDURE — 99214 OFFICE O/P EST MOD 30 MIN: CPT | Performed by: FAMILY MEDICINE

## 2022-10-10 NOTE — PROGRESS NOTES
Subjective   Uriah Willingham is a 62 y.o. male.  Reevaluation morbid obesity type 2 diabetes hypertension chronic anticoagulation venous insufficiency chronic knee pain diagnoses below in the interim lab work acceptable this maintain blood pressure weights remained about the same.  Continues getting shots in the knees.  I have been counseled on the need for continued efforts to weight loss as we have discussed in the past.  Is up-to-date on all other.  Declines flu vaccine    History of Present Illness   HPI    The following portions of the patient's history were reviewed and updated as appropriate: allergies, current medications, past family history, past medical history, past social history, past surgical history and problem list.    Review of Systems  Review of Systems   Constitutional: Negative for activity change, appetite change, fatigue and unexpected weight change.   HENT: Negative for trouble swallowing and voice change.    Eyes: Negative for redness and visual disturbance.   Respiratory: Negative for cough and wheezing.    Cardiovascular: Negative for chest pain and palpitations.   Gastrointestinal: Negative for abdominal pain, constipation, diarrhea, nausea and vomiting.   Genitourinary: Negative for urgency.   Musculoskeletal: Positive for arthralgias and gait problem. Negative for joint swelling.   Neurological: Negative for syncope and headaches.   Hematological: Negative for adenopathy.   Psychiatric/Behavioral: Negative for sleep disturbance.       Objective   Physical Exam  Physical Exam  Constitutional:       Appearance: He is well-developed.   HENT:      Head: Normocephalic.   Eyes:      Pupils: Pupils are equal, round, and reactive to light.   Neck:      Thyroid: No thyromegaly.   Cardiovascular:      Rate and Rhythm: Normal rate and regular rhythm.      Heart sounds: Normal heart sounds. No murmur heard.    No friction rub. No gallop.   Pulmonary:      Breath sounds: Normal breath sounds.    Abdominal:      General: There is no distension.      Palpations: Abdomen is soft. There is no mass.      Tenderness: There is no abdominal tenderness.   Musculoskeletal:         General: Normal range of motion.      Cervical back: Normal range of motion.      Comments: Chronic venous insufficiency no skin breakdown get up and go 5 seconds with a cane   Skin:     General: Skin is warm and dry.   Neurological:      Mental Status: He is alert and oriented to person, place, and time.      Deep Tendon Reflexes: Reflexes are normal and symmetric.   Psychiatric:         Attention and Perception: Attention normal.         Mood and Affect: Mood normal.         Speech: Speech normal.         Behavior: Behavior normal.         Thought Content: Thought content normal.         Cognition and Memory: Cognition normal.       Results for orders placed or performed in visit on 10/06/22   CBC (No Diff)    Specimen: Blood   Result Value Ref Range    WBC 7.68 3.40 - 10.80 10*3/mm3    RBC 5.22 4.14 - 5.80 10*6/mm3    Hemoglobin 15.9 13.0 - 17.7 g/dL    Hematocrit 46.3 37.5 - 51.0 %    MCV 88.7 79.0 - 97.0 fL    MCH 30.5 26.6 - 33.0 pg    MCHC 34.3 31.5 - 35.7 g/dL    RDW 12.7 12.3 - 15.4 %    RDW-SD 40.2 37.0 - 54.0 fl    MPV 10.5 6.0 - 12.0 fL    Platelets 238 140 - 450 10*3/mm3   Comprehensive Metabolic Panel    Specimen: Blood   Result Value Ref Range    Glucose 133 (H) 65 - 99 mg/dL    BUN 16 8 - 23 mg/dL    Creatinine 1.11 0.76 - 1.27 mg/dL    Sodium 138 136 - 145 mmol/L    Potassium 4.1 3.5 - 5.2 mmol/L    Chloride 100 98 - 107 mmol/L    CO2 21.9 (L) 22.0 - 29.0 mmol/L    Calcium 9.8 8.6 - 10.5 mg/dL    Total Protein 7.1 6.0 - 8.5 g/dL    Albumin 4.10 3.50 - 5.20 g/dL    ALT (SGPT) 26 1 - 41 U/L    AST (SGOT) 25 1 - 40 U/L    Alkaline Phosphatase 43 39 - 117 U/L    Total Bilirubin 1.6 (H) 0.0 - 1.2 mg/dL    Globulin 3.0 gm/dL    A/G Ratio 1.4 g/dL    BUN/Creatinine Ratio 14.4 7.0 - 25.0    Anion Gap 16.1 (H) 5.0 - 15.0 mmol/L     "eGFR 76.0 >60.0 mL/min/1.73   Hemoglobin A1c    Specimen: Blood   Result Value Ref Range    Hemoglobin A1C 6.10 (H) 4.80 - 5.60 %   Lipid Panel With LDL / HDL Ratio    Specimen: Blood   Result Value Ref Range    Total Cholesterol 187 0 - 200 mg/dL    Triglycerides 138 0 - 150 mg/dL    HDL Cholesterol 44 40 - 60 mg/dL    LDL Cholesterol  118 (H) 0 - 100 mg/dL    VLDL Cholesterol 25 5 - 40 mg/dL    LDL/HDL Ratio 2.62    Magnesium    Specimen: Blood   Result Value Ref Range    Magnesium 1.6 1.6 - 2.4 mg/dL   PSA Screen    Specimen: Blood   Result Value Ref Range    PSA 1.090 0.000 - 4.000 ng/mL   MicroAlbumin, Urine, Random - Urine, Clean Catch    Specimen: Urine, Clean Catch   Result Value Ref Range    Microalbumin, Urine 1.2 mg/dL           Visit Vitals  /78   Ht 172.7 cm (68\")   Wt (!) 170 kg (375 lb 6.4 oz)   BMI 57.08 kg/m²     Body mass index is 57.08 kg/m².  /78   Ht 172.7 cm (68\")   Wt (!) 170 kg (375 lb 6.4 oz)   BMI 57.08 kg/m²       Assessment/Plan   Diagnoses and all orders for this visit:    1. Type 2 diabetes mellitus without complication, without long-term current use of insulin (HCC) (Primary)    2. Obesity, morbid, BMI 50 or higher (HCC)    3. Morbid (severe) obesity due to excess calories (HCC)    4. Essential hypertension, benign    5. Chronic anticoagulation     on wt loss measures and programs  Counseled mainly on lifestyle measures.  Counseled on mobility.  Recheck 4 months A1c  "

## 2022-10-26 DIAGNOSIS — M25.561 CHRONIC PAIN OF BOTH KNEES: Primary | ICD-10-CM

## 2022-10-26 DIAGNOSIS — M25.562 CHRONIC PAIN OF BOTH KNEES: Primary | ICD-10-CM

## 2022-10-26 DIAGNOSIS — G89.29 CHRONIC PAIN OF BOTH KNEES: Primary | ICD-10-CM

## 2022-10-28 ENCOUNTER — OFFICE VISIT (OUTPATIENT)
Dept: ORTHOPEDIC SURGERY | Facility: CLINIC | Age: 62
End: 2022-10-28

## 2022-10-28 VITALS — HEIGHT: 68 IN | WEIGHT: 315 LBS | BODY MASS INDEX: 47.74 KG/M2

## 2022-10-28 DIAGNOSIS — M25.562 CHRONIC PAIN OF BOTH KNEES: Primary | ICD-10-CM

## 2022-10-28 DIAGNOSIS — M17.0 PRIMARY OSTEOARTHRITIS OF BOTH KNEES: ICD-10-CM

## 2022-10-28 DIAGNOSIS — M25.561 CHRONIC PAIN OF BOTH KNEES: Primary | ICD-10-CM

## 2022-10-28 DIAGNOSIS — G89.29 CHRONIC PAIN OF BOTH KNEES: Primary | ICD-10-CM

## 2022-10-28 DIAGNOSIS — E66.01 OBESITY, MORBID, BMI 50 OR HIGHER: ICD-10-CM

## 2022-10-28 PROCEDURE — 20610 DRAIN/INJ JOINT/BURSA W/O US: CPT | Performed by: NURSE PRACTITIONER

## 2022-10-28 RX ORDER — TRIAMCINOLONE ACETONIDE 40 MG/ML
80 INJECTION, SUSPENSION INTRA-ARTICULAR; INTRAMUSCULAR
Status: COMPLETED | OUTPATIENT
Start: 2022-10-28 | End: 2022-10-28

## 2022-10-28 RX ORDER — BUPIVACAINE HYDROCHLORIDE 5 MG/ML
4 INJECTION, SOLUTION PERINEURAL
Status: COMPLETED | OUTPATIENT
Start: 2022-10-28 | End: 2022-10-28

## 2022-10-28 RX ADMIN — BUPIVACAINE HYDROCHLORIDE 4 ML: 5 INJECTION, SOLUTION PERINEURAL at 09:19

## 2022-10-28 RX ADMIN — TRIAMCINOLONE ACETONIDE 80 MG: 40 INJECTION, SUSPENSION INTRA-ARTICULAR; INTRAMUSCULAR at 09:19

## 2022-10-28 NOTE — PROGRESS NOTES
Uriah Willingham is a 62 y.o. male returns for     Chief Complaint   Patient presents with   • Left Knee - Follow-up, Pain, Edema   • Right Knee - Follow-up, Pain, Edema     HISTORY OF PRESENT ILLNESS: Patient presents to office for follow-up of chronic bilateral knee pain due to end-stage osteoarthritis.  Patient has experienced ongoing and progressively worsening bilateral knee pain over the course of several years.  He typically has worse pain in the right knee.  Patient has been treated conservatively with multiple previous intra-articular injections of steroid, intramuscular injections of steroid, intramuscular injections of Toradol and use of a cane for modified weightbearing.  Patient had previously discussed his candidacy for knee arthroplasty with Dr. Cedillo and was told he was not a surgical candidate due to recurrent cellulitis in his lower legs and his elevated BMI.  Last injections were given on 8/25/2022, which offered some temporary pain improvement.  The patient continues to experience aching and grinding pain in the bilateral knee joints that worsens with longer periods of weightbearing activity.  The patient also continues to experience intermittent joint stiffness and joint swelling.  Patient continues to take Norco 7.5 mg 4 times daily as needed for control of chronic pain as prescribed by his pain management physician.  Patient is unable to take oral NSAIDs due to his current use of Eliquis.  Dr. Cedillo had previously tried to order viscosupplementation but the patient's insurance would not cover these injections.  No new complaints or concerns noted since last office visit.  No falls or injuries reported since last office visit.  Patient is requesting to proceed with repeat injections today in an effort to improve his pain.  Current pain scale is 7/10.     CONCURRENT MEDICAL HISTORY:    The following portions of the patient's history were reviewed and updated as appropriate:  "allergies, current medications, past family history, past medical history, past social history, past surgical history and problem list.     ROS  No fevers or chills.  No chest pain or shortness of air.  No GI or  disturbances.  Bilateral knee pain.    PHYSICAL EXAMINATION:       Ht 172.7 cm (68\")   Wt (!) 170 kg (375 lb)   BMI 57.02 kg/m²     Physical Exam  Vitals reviewed.   Constitutional:       General: He is not in acute distress.     Appearance: He is well-developed. He is obese. He is not ill-appearing.   HENT:      Head: Normocephalic.   Pulmonary:      Effort: Pulmonary effort is normal. No respiratory distress.   Abdominal:      General: There is no distension.      Palpations: Abdomen is soft.   Musculoskeletal:         General: Swelling (Mild, bilateral knees) and tenderness (Mild, bilateral knees) present. No deformity or signs of injury.      Right knee: No effusion.      Left knee: No effusion.   Skin:     General: Skin is warm and dry.      Capillary Refill: Capillary refill takes less than 2 seconds.      Findings: No erythema.   Neurological:      Mental Status: He is alert and oriented to person, place, and time.      GCS: GCS eye subscore is 4. GCS verbal subscore is 5. GCS motor subscore is 6.   Psychiatric:         Speech: Speech normal.         Behavior: Behavior normal.         Thought Content: Thought content normal.         Judgment: Judgment normal.         GAIT:     []  Normal  [x]  Antalgic    Assistive device: []  None  []  Walker     []  Crutches  [x]  Cane     []  Wheelchair  []  Stretcher    Right Knee Exam     Tenderness   The patient is experiencing tenderness in the lateral joint line and medial joint line (Mild, diffuse).    Range of Motion   Extension: 5   Flexion: 100     Tests   Varus: negative Valgus: negative    Other   Erythema: absent  Sensation: normal  Pulse: present  Swelling: mild  Effusion: no effusion present      Left Knee Exam     Tenderness   The patient is " experiencing tenderness in the medial joint line (Mild, diffuse).    Range of Motion   Extension: 5   Flexion: 110     Tests   Varus: negative Valgus: negative    Other   Erythema: absent  Sensation: normal  Pulse: present  Swelling: mild  Effusion: no effusion present            Procedure: Bilateral AP standing knee views     Reason for exam: Bilateral knee pain.     FINDINGS: Right knee severe loss of medial and lateral knee joint  compartment height with bone appearing to oppose bone. This would  be suspicious for severe osteoarthritic changes. Otherwise bony  structures of the right knee are normal. There is no evidence of  fracture or dislocation. Soft tissue structures are normal.     Left knee severe loss of medial knee joint compartment height  with bone appearing to oppose bone. This would be suspicious for  severe osteoarthritic changes. Otherwise bony structures of the  left knee are normal. There is no evidence of fracture or  dislocation. Soft tissue structures are normal.     IMPRESSION:  1.  Severe bilateral knee osteoarthritic changes as described  above.  2.  No acute bilateral knee abnormality.     Electronically signed by:  Case Avendano MD  3/23/2022 7:51 AM CDT  Workstation: [id number]     Procedure: Bilateral knee lateral view only     Reason for exam: Knee pain.     FINDINGS: Loss of bilateral knee knee joint compartment heights  better described on the AP standing views suspicious for severe  osteoarthritic changes. Otherwise bony structures of bilateral  knees are normal. There is no evidence of fracture or  dislocation. Soft tissue structures are normal.     IMPRESSION:  1.  Loss of bilateral knee knee joint compartment heights better  described on the AP standing views suspicious for severe  osteoarthritic changes.   2.  No acute bilateral lateral knee abnormality.     Electronically signed by:  Case Avendano MD  3/23/2022 7:54 AM CDT  Workstation: [id number]      ASSESSMENT:    Diagnoses  and all orders for this visit:    Chronic pain of both knees  -     Large Joint Arthrocentesis: R knee  -     Large Joint Arthrocentesis: L knee    Primary osteoarthritis of both knees  -     Large Joint Arthrocentesis: R knee  -     Large Joint Arthrocentesis: L knee    Obesity, morbid, BMI 50 or higher (HCC)    PLAN    Large Joint Arthrocentesis: R knee  Date/Time: 10/28/2022 9:19 AM  Consent given by: patient  Timeout: Immediately prior to procedure a time out was called to verify the correct patient, procedure, equipment, support staff and site/side marked as required   Supporting Documentation  Indications: pain, joint swelling and diagnostic evaluation   Procedure Details  Location: knee - R knee  Preparation: Patient was prepped and draped in the usual sterile fashion  Needle size: 22 G  Approach: anterolateral  Medications administered: 4 mL bupivacaine 0.5 %; 80 mg triamcinolone acetonide 40 MG/ML  Patient tolerance: patient tolerated the procedure well with no immediate complications    Large Joint Arthrocentesis: L knee  Date/Time: 10/28/2022 9:19 AM  Consent given by: patient  Timeout: Immediately prior to procedure a time out was called to verify the correct patient, procedure, equipment, support staff and site/side marked as required   Supporting Documentation  Indications: pain, diagnostic evaluation and joint swelling   Procedure Details  Location: knee - L knee  Preparation: Patient was prepped and draped in the usual sterile fashion  Needle size: 22 G  Approach: anterolateral  Medications administered: 4 mL bupivacaine 0.5 %; 80 mg triamcinolone acetonide 40 MG/ML  Patient tolerance: patient tolerated the procedure well with no immediate complications      Patient is doing well overall but continues to experience chronic/persistent bilateral knee pain due to known end-stage osteoarthritis in both knee joints, more pronounced in the right knee.  The patient has been treated conservatively as  described in the HPI above and he is here today requesting repeat injections in his knees in an effort to improve his pain.  As previously noted, the patient was deemed to not be a surgical candidate due to his elevated BMI and history of recurrent cellulitis in the lower extremities, which continues to be a chronic issue.  There are no signs of infection noted and no signs of cellulitis anywhere near his knee joints today.  Recommend proceeding today with repeat intra-articular injections of steroid to the bilateral knees for management of joint pain/inflammation/swelling.     Recommend the following:  -Rest and activity modification as tolerated and based on his pain.  -Continue with use of a cane or use of a walker for modified weightbearing.  -Patient can gradually progress his weightbearing and activity as pain allows.  -Continue with efforts for aggressive weight loss to improve his knee pain as well as his overall health.  -Elevation and ice therapy to the bilateral knees as needed to minimize pain/swelling/inflammation.  -Continue with efforts for weight loss to improve his chronic bilateral knee pain by taking excessive pressure off of the lower extremities.  Current BMI is 57.02     Patient already takes Norco 7.5 mg tablets for chronic pain per pain management.  He can take some additional Tylenol as needed but should not exceed more than 4000 mg of acetaminophen in a 24-hour period.  Patient should avoid oral NSAIDs due to his current use of Eliquis.      Follow-up in 8-12  weeks for recheck as needed for any new, worsening or persistent symptoms. Follow up sooner as needed for any new, worsening or persistent symptoms.     EMR Dragon/Transciption Disclaimer: Some of this note may be an electronic transcription/translation of spoken language to printed text using the Dragon Dictation System.     Return in about 8 weeks (around 12/23/2022), or if symptoms worsen or fail to improve, for Recheck.         This document has been electronically signed by TUNDE Joshi on October 28, 2022 15:36 CDT      TUNDE Joshi

## 2022-12-27 ENCOUNTER — OFFICE VISIT (OUTPATIENT)
Dept: ORTHOPEDIC SURGERY | Facility: CLINIC | Age: 62
End: 2022-12-27

## 2022-12-27 VITALS — WEIGHT: 315 LBS | HEIGHT: 68 IN | BODY MASS INDEX: 47.74 KG/M2

## 2022-12-27 DIAGNOSIS — E66.01 OBESITY, MORBID, BMI 50 OR HIGHER: ICD-10-CM

## 2022-12-27 DIAGNOSIS — M25.562 CHRONIC PAIN OF BOTH KNEES: Primary | ICD-10-CM

## 2022-12-27 DIAGNOSIS — G89.29 CHRONIC PAIN OF BOTH KNEES: Primary | ICD-10-CM

## 2022-12-27 DIAGNOSIS — M25.561 CHRONIC PAIN OF BOTH KNEES: Primary | ICD-10-CM

## 2022-12-27 DIAGNOSIS — M17.0 PRIMARY OSTEOARTHRITIS OF BOTH KNEES: ICD-10-CM

## 2022-12-27 PROCEDURE — 20610 DRAIN/INJ JOINT/BURSA W/O US: CPT | Performed by: NURSE PRACTITIONER

## 2022-12-27 RX ORDER — LIDOCAINE HYDROCHLORIDE 10 MG/ML
4 INJECTION, SOLUTION INFILTRATION; PERINEURAL
Status: COMPLETED | OUTPATIENT
Start: 2022-12-27 | End: 2022-12-27

## 2022-12-27 RX ORDER — TRIAMCINOLONE ACETONIDE 40 MG/ML
80 INJECTION, SUSPENSION INTRA-ARTICULAR; INTRAMUSCULAR
Status: COMPLETED | OUTPATIENT
Start: 2022-12-27 | End: 2022-12-27

## 2022-12-27 RX ADMIN — LIDOCAINE HYDROCHLORIDE 4 ML: 10 INJECTION, SOLUTION INFILTRATION; PERINEURAL at 09:37

## 2022-12-27 RX ADMIN — TRIAMCINOLONE ACETONIDE 80 MG: 40 INJECTION, SUSPENSION INTRA-ARTICULAR; INTRAMUSCULAR at 09:37

## 2022-12-27 RX ADMIN — TRIAMCINOLONE ACETONIDE 80 MG: 40 INJECTION, SUSPENSION INTRA-ARTICULAR; INTRAMUSCULAR at 09:36

## 2022-12-27 RX ADMIN — LIDOCAINE HYDROCHLORIDE 4 ML: 10 INJECTION, SOLUTION INFILTRATION; PERINEURAL at 09:36

## 2022-12-27 NOTE — PROGRESS NOTES
Uriah Willingham is a 62 y.o. male returns for     Chief Complaint   Patient presents with   • Left Knee - Follow-up, Pain, Edema   • Right Knee - Follow-up, Pain, Edema     HISTORY OF PRESENT ILLNESS: Patient presents to office for follow-up of chronic bilateral knee pain due to known end-stage osteoarthritis.  Initial onset of pain occurred several years ago and has progressively worsened over time.  The patient typically has worse pain in the right knee compared to the left.  The patient has been treated conservatively with multiple previous intra-articular injections of steroid, intramuscular injections of steroid, intramuscular injections of Toradol, ice therapy, rest/activity modification and modified weightbearing with use of a cane. Patient had previously discussed his candidacy for knee arthroplasty with Dr. Cedillo and was told he was not a surgical candidate due to recurrent cellulitis in his lower legs and his elevated BMI.  Last injections were given on 10/28/2022, which offered good pain relief for several weeks.  Patient reports continued aching and grinding pain in the bilateral knee joints that worsens with longer periods of weightbearing activity.  Patient also continues to experience intermittent joint stiffness and joint swelling.  Patient states his bilateral knee pain has been gradually worsening/returning.  He is requesting repeat injections today in both knees in an effort to improve his pain. Patient continues to take Norco 7.5 mg 4 times daily as needed for control of chronic pain as prescribed by his pain management physician.  Patient is unable to take oral NSAIDs due to his current use of Eliquis. Dr. Cedillo had previously tried to order viscosupplementation but the patient's insurance would not cover these injections. No new complaints or concerns noted since last office visit.  No falls or injuries reported since last office visit.  Current pain scale is 7/10.      CONCURRENT  "MEDICAL HISTORY:    The following portions of the patient's history were reviewed and updated as appropriate: allergies, current medications, past family history, past medical history, past social history, past surgical history and problem list.     ROS  No fevers or chills.  No chest pain or shortness of air.  No GI or  disturbances.  Bilateral knee pain.     PHYSICAL EXAMINATION:       Ht 172.7 cm (68\")   Wt (!) 170 kg (375 lb)   BMI 57.02 kg/m²     Physical Exam  Vitals reviewed.   Constitutional:       General: He is not in acute distress.     Appearance: He is well-developed. He is not ill-appearing.   HENT:      Head: Normocephalic.   Pulmonary:      Effort: Pulmonary effort is normal. No respiratory distress.   Abdominal:      General: There is no distension.      Palpations: Abdomen is soft.   Musculoskeletal:         General: Swelling (Mild, bilateral knees) and tenderness (Mild, bilateral knees) present.      Right knee: No effusion.      Left knee: No effusion.      Right lower leg: Edema present.      Left lower leg: Edema present.   Skin:     General: Skin is warm and dry.      Capillary Refill: Capillary refill takes less than 2 seconds.      Findings: No erythema.   Neurological:      Mental Status: He is alert and oriented to person, place, and time.      GCS: GCS eye subscore is 4. GCS verbal subscore is 5. GCS motor subscore is 6.   Psychiatric:         Speech: Speech normal.         Behavior: Behavior normal.         Thought Content: Thought content normal.         Judgment: Judgment normal.         GAIT:     []  Normal  [x]  Antalgic    Assistive device: []  None  []  Walker     []  Crutches  [x]  Cane     []  Wheelchair  []  Stretcher    Right Knee Exam     Tenderness   The patient is experiencing tenderness in the lateral joint line and medial joint line (Mild, diffuse).    Range of Motion   Extension: 5   Flexion: 100     Tests   Varus: negative Valgus: negative    Other   Erythema: " absent  Sensation: normal  Pulse: present  Swelling: mild  Effusion: no effusion present    Comments:  Pain and crepitus with range of motion.  Mild, generalized swelling.  Pitting edema noted in the distal aspect of the lower leg, which is a chronic issue.  No signs of cellulitis.  No warmth or erythema.  No signs of infection noted.      Left Knee Exam     Tenderness   The patient is experiencing tenderness in the medial joint line and lateral joint line (Mild, diffuse).    Range of Motion   Extension: 5   Flexion: 110     Tests   Varus: negative Valgus: negative    Other   Erythema: absent  Sensation: normal  Pulse: present  Swelling: mild  Effusion: no effusion present    Comments:  Pain and crepitus with range of motion.  Mild, generalized swelling. Pitting edema noted in the distal aspect of the lower leg, which is a chronic issue.  No signs of cellulitis.  No warmth or erythema.  No signs of infection noted.            Procedure: Bilateral AP standing knee views     Reason for exam: Bilateral knee pain.     FINDINGS: Right knee severe loss of medial and lateral knee joint  compartment height with bone appearing to oppose bone. This would  be suspicious for severe osteoarthritic changes. Otherwise bony  structures of the right knee are normal. There is no evidence of  fracture or dislocation. Soft tissue structures are normal.     Left knee severe loss of medial knee joint compartment height  with bone appearing to oppose bone. This would be suspicious for  severe osteoarthritic changes. Otherwise bony structures of the  left knee are normal. There is no evidence of fracture or  dislocation. Soft tissue structures are normal.     IMPRESSION:  1.  Severe bilateral knee osteoarthritic changes as described  above.  2.  No acute bilateral knee abnormality.     Electronically signed by:  Case Avendano MD  3/23/2022 7:51 AM CDT  Workstation: [id number]     Procedure: Bilateral knee lateral view only     Reason for  exam: Knee pain.     FINDINGS: Loss of bilateral knee knee joint compartment heights  better described on the AP standing views suspicious for severe  osteoarthritic changes. Otherwise bony structures of bilateral  knees are normal. There is no evidence of fracture or  dislocation. Soft tissue structures are normal.     IMPRESSION:  1.  Loss of bilateral knee knee joint compartment heights better  described on the AP standing views suspicious for severe  osteoarthritic changes.   2.  No acute bilateral lateral knee abnormality.     Electronically signed by:  Case Avendano MD  3/23/2022 7:54 AM CDT  Workstation: [id number]      ASSESSMENT:    Diagnoses and all orders for this visit:    Chronic pain of both knees  -     Large Joint Arthrocentesis: R knee  -     Large Joint Arthrocentesis: L knee    Primary osteoarthritis of both knees  -     Large Joint Arthrocentesis: R knee  -     Large Joint Arthrocentesis: L knee    Obesity, morbid, BMI 50 or higher (HCC)    PLAN    Large Joint Arthrocentesis: R knee  Date/Time: 12/27/2022 9:36 AM  Consent given by: patient  Timeout: Immediately prior to procedure a time out was called to verify the correct patient, procedure, equipment, support staff and site/side marked as required   Supporting Documentation  Indications: pain, joint swelling and diagnostic evaluation   Procedure Details  Location: knee - R knee  Preparation: Patient was prepped and draped in the usual sterile fashion  Needle size: 22 G  Approach: anterolateral  Medications administered: 4 mL lidocaine 1 %; 80 mg triamcinolone acetonide 40 MG/ML  Patient tolerance: patient tolerated the procedure well with no immediate complications    Large Joint Arthrocentesis: L knee  Date/Time: 12/27/2022 9:37 AM  Consent given by: patient  Timeout: Immediately prior to procedure a time out was called to verify the correct patient, procedure, equipment, support staff and site/side marked as required   Supporting  Documentation  Indications: pain, joint swelling and diagnostic evaluation   Procedure Details  Location: knee - L knee  Preparation: Patient was prepped and draped in the usual sterile fashion  Needle size: 22 G  Approach: anterolateral  Medications administered: 4 mL lidocaine 1 %; 80 mg triamcinolone acetonide 40 MG/ML  Patient tolerance: patient tolerated the procedure well with no immediate complications      Patient is doing well overall but continues to experience chronic/persistent bilateral knee pain due to known end-stage osteoarthritis affecting both knee joints, more pronounced in the right knee.  The patient has been treated conservatively as described in the HPI above.  The patient typically gets good pain relief with intra-articular injections of steroid but they only last him approximately 8 weeks before the pain begins to return.  The patient has been treated conservatively as described in the HPI above.  He is here today requesting repeat injections in both knees in an effort to improve his pain.  As previously noted, the patient was deemed to not be a surgical candidate for elective knee arthroplasty when he had discussed this previously with Dr. Cedillo due to his elevated BMI and history of recurrent cellulitis in the lower extremities, which continues to be a chronic issue.  There are no current signs of infection noted and no signs of cellulitis anywhere near his knee joints today.  He does continue to have issues with chronic edema primarily affecting the distal aspects of the lower extremities.  Recommend proceeding today with repeat intra-articular injections of steroid to the bilateral knees for management of joint pain/inflammation/swelling    Viscosupplementation would be a good treatment option for further management of his chronic osteoarthritic knee pain, but his insurance does not currently cover these injections.  Dr. Cedillo had previously ordered viscosupplementation but his  insurance denied those injections.     Recommend the following:  -Rest and activity modification as tolerated and based on his pain.  -Continue with use of a cane or use of a walker for modified weightbearing.  -Patient can gradually progress his weightbearing and activity as pain allows.  -Continue with efforts for aggressive weight loss to improve his knee pain as well as his overall health.  -Elevation and ice therapy to the bilateral knees as needed to minimize pain/swelling/inflammation.  -Continue with efforts for weight loss to improve his chronic bilateral knee pain by taking excessive pressure off of the lower extremities. Current BMI is 57.02     Patient already takes Norco 7.5 mg tablets for chronic pain per pain management.  He can take some additional Tylenol as needed but should not exceed more than 4000 mg of acetaminophen in a 24-hour period.  Patient should avoid oral NSAIDs due to his current use of Eliquis.     Follow-up in 8 to 12 weeks for recheck as needed for any new, worsening or persistent symptoms.  Follow-up sooner as needed.    EMR Dragon/Transciption Disclaimer: Some of this note may be an electronic transcription/translation of spoken language to printed text using the Dragon Dictation System.     Return in about 8 weeks (around 2/21/2023), or if symptoms worsen or fail to improve, for Recheck.        This document has been electronically signed by TUNDE Joshi on December 27, 2022 10:46 CST      TUNDE Joshi

## 2023-01-27 DIAGNOSIS — E11.9 TYPE 2 DIABETES MELLITUS WITHOUT COMPLICATION, WITHOUT LONG-TERM CURRENT USE OF INSULIN: Chronic | ICD-10-CM

## 2023-02-06 ENCOUNTER — OFFICE VISIT (OUTPATIENT)
Dept: FAMILY MEDICINE CLINIC | Facility: CLINIC | Age: 63
End: 2023-02-06
Payer: COMMERCIAL

## 2023-02-06 VITALS
WEIGHT: 315 LBS | BODY MASS INDEX: 47.74 KG/M2 | SYSTOLIC BLOOD PRESSURE: 126 MMHG | DIASTOLIC BLOOD PRESSURE: 82 MMHG | HEIGHT: 68 IN

## 2023-02-06 DIAGNOSIS — I10 ESSENTIAL HYPERTENSION, BENIGN: Chronic | ICD-10-CM

## 2023-02-06 DIAGNOSIS — Z79.01 CHRONIC ANTICOAGULATION: Chronic | ICD-10-CM

## 2023-02-06 DIAGNOSIS — E11.9 TYPE 2 DIABETES MELLITUS WITHOUT COMPLICATION, WITHOUT LONG-TERM CURRENT USE OF INSULIN: Primary | Chronic | ICD-10-CM

## 2023-02-06 DIAGNOSIS — E66.01 MORBID (SEVERE) OBESITY DUE TO EXCESS CALORIES: Chronic | ICD-10-CM

## 2023-02-06 DIAGNOSIS — I87.2 VENOUS INSUFFICIENCY: Chronic | ICD-10-CM

## 2023-02-06 LAB
EXPIRATION DATE: NORMAL
HBA1C MFR BLD: 6.3 %
Lab: 532

## 2023-02-06 PROCEDURE — 99214 OFFICE O/P EST MOD 30 MIN: CPT | Performed by: FAMILY MEDICINE

## 2023-02-06 PROCEDURE — 83036 HEMOGLOBIN GLYCOSYLATED A1C: CPT | Performed by: FAMILY MEDICINE

## 2023-02-06 NOTE — PROGRESS NOTES
Answers for HPI/ROS submitted by the patient on 2/5/2023  What is the primary reason for your visit?: Other  Please describe your symptoms.: Routine followip  Have you had these symptoms before?: No  How long have you been having these symptoms?: Greater than 2 weeks  Please list any medications you are currently taking for this condition.: On my chart  Please describe any probable cause for these symptoms. : My chart    Subjective   Uriahgaurav Willingham is a 62 y.o. male.  Reevaluation type 2 diabetes morbid obesity hypertension venous insufficiency chronic anticoagulation for chronic DVTs diagnoses below.  In interim enforces gained weight not as active in the wintertime however A1c holding steady at 6.3.  Feels well other than dependent edema from doing a lot of sitting weight counseled mainly on increasing exercise and increasing activity level.  Is up-to-date on all other.  Not due for colon screening till 2024    History of Present Illness   HPI    The following portions of the patient's history were reviewed and updated as appropriate: allergies, current medications, past family history, past medical history, past social history, past surgical history and problem list.    Review of Systems  Review of Systems   Constitutional: Negative for activity change, appetite change, fatigue and unexpected weight change.   HENT: Negative for trouble swallowing and voice change.    Eyes: Negative for redness and visual disturbance.   Respiratory: Negative for cough and wheezing.    Cardiovascular: Positive for leg swelling (Chronic). Negative for chest pain and palpitations.   Gastrointestinal: Negative for abdominal pain, constipation, diarrhea, nausea and vomiting.   Genitourinary: Negative for urgency.   Musculoskeletal: Negative for joint swelling.   Neurological: Negative for syncope and headaches.   Hematological: Negative for adenopathy.   Psychiatric/Behavioral: Negative for sleep disturbance.       Objective  "  Physical Exam  Physical Exam  Constitutional:       Appearance: He is well-developed.   HENT:      Head: Normocephalic.   Eyes:      Pupils: Pupils are equal, round, and reactive to light.   Neck:      Thyroid: No thyromegaly.   Cardiovascular:      Rate and Rhythm: Normal rate and regular rhythm.      Heart sounds: Normal heart sounds. No murmur heard.    No friction rub. No gallop.   Pulmonary:      Breath sounds: Normal breath sounds.   Abdominal:      General: There is no distension.      Palpations: Abdomen is soft. There is no mass.      Tenderness: There is no abdominal tenderness.   Musculoskeletal:         General: Normal range of motion.      Cervical back: Normal range of motion.      Comments: 4+ dependent edema bilaterally chronically.  Mild desquamation but no skin breakdown.  Get up and go 3 to 5 seconds slightly wide-based gait   Skin:     General: Skin is warm and dry.   Neurological:      Mental Status: He is alert and oriented to person, place, and time.      Deep Tendon Reflexes: Reflexes are normal and symmetric.   Psychiatric:         Attention and Perception: Attention normal.         Mood and Affect: Mood normal.         Speech: Speech normal.         Behavior: Behavior normal.         Thought Content: Thought content normal.         Cognition and Memory: Cognition normal.       Results for orders placed or performed in visit on 02/06/23   POC Glycosylated Hemoglobin (Hb A1C)    Specimen: Blood   Result Value Ref Range    Hemoglobin A1C 6.3 %    Lot Number [id number]     Expiration Date 10-            Visit Vitals  /82   Ht 172.7 cm (68\")   Wt (!) 173 kg (381 lb 3.2 oz)   BMI 57.96 kg/m²     Body mass index is 57.96 kg/m².    /82   Ht 172.7 cm (68\")   Wt (!) 173 kg (381 lb 3.2 oz)   BMI 57.96 kg/m²     Assessment/Plan   Diagnoses and all orders for this visit:    1. Type 2 diabetes mellitus without complication, without long-term current use of insulin (HCC) (Primary)  -     " POC Glycosylated Hemoglobin (Hb A1C)    2. Morbid (severe) obesity due to excess calories (HCC)    3. Essential hypertension, benign    4. Chronic anticoagulation    5. Venous insufficiency    Obesity counseled strongly on weight loss measures leg elevation compression hosiery standard cares in the past.  Continue all medicines as outlined including anticoagulation counseled on bleeding prevention.  Recheck 4 months A1c here

## 2023-02-23 ENCOUNTER — OFFICE VISIT (OUTPATIENT)
Dept: ORTHOPEDIC SURGERY | Facility: CLINIC | Age: 63
End: 2023-02-23
Payer: COMMERCIAL

## 2023-02-23 VITALS — BODY MASS INDEX: 47.74 KG/M2 | HEIGHT: 68 IN | WEIGHT: 315 LBS

## 2023-02-23 DIAGNOSIS — M17.0 PRIMARY OSTEOARTHRITIS OF BOTH KNEES: ICD-10-CM

## 2023-02-23 DIAGNOSIS — G89.29 CHRONIC PAIN OF BOTH KNEES: Primary | ICD-10-CM

## 2023-02-23 DIAGNOSIS — M25.562 CHRONIC PAIN OF BOTH KNEES: Primary | ICD-10-CM

## 2023-02-23 DIAGNOSIS — E66.01 OBESITY, MORBID, BMI 50 OR HIGHER: ICD-10-CM

## 2023-02-23 DIAGNOSIS — M25.561 CHRONIC PAIN OF BOTH KNEES: Primary | ICD-10-CM

## 2023-02-23 PROCEDURE — 20610 DRAIN/INJ JOINT/BURSA W/O US: CPT | Performed by: NURSE PRACTITIONER

## 2023-02-23 RX ORDER — TRIAMCINOLONE ACETONIDE 40 MG/ML
80 INJECTION, SUSPENSION INTRA-ARTICULAR; INTRAMUSCULAR
Status: COMPLETED | OUTPATIENT
Start: 2023-02-23 | End: 2023-02-23

## 2023-02-23 RX ORDER — LIDOCAINE HYDROCHLORIDE 10 MG/ML
4 INJECTION, SOLUTION INFILTRATION; PERINEURAL
Status: COMPLETED | OUTPATIENT
Start: 2023-02-23 | End: 2023-02-23

## 2023-02-23 RX ADMIN — TRIAMCINOLONE ACETONIDE 80 MG: 40 INJECTION, SUSPENSION INTRA-ARTICULAR; INTRAMUSCULAR at 10:12

## 2023-02-23 RX ADMIN — LIDOCAINE HYDROCHLORIDE 4 ML: 10 INJECTION, SOLUTION INFILTRATION; PERINEURAL at 10:12

## 2023-02-23 NOTE — PROGRESS NOTES
Knee Joint Injections      Patient: Uriah Willingham    YOB: 1960    Chief Complaint   Patient presents with   • Right Knee - Follow-up, Pain, Edema   • Left Knee - Follow-up, Pain, Edema     History of Present Illness: Patient presents to office for follow-up of chronic bilateral knee pain due to known end-stage osteoarthritis.  Initial onset of pain occurred several years ago and has progressively worsened over time.  The patient has been treated conservatively with multiple previous intra-articular injections of steroid, intramuscular injections of steroid, intramuscular injections of Toradol, ice therapy, rest/activity modification and modified weightbearing with use of a cane.  Patient had previously discussed his candidacy for knee arthroplasty with Dr. Cedillo and was told he was not a surgical candidate due to recurrent cellulitis in his lower legs and his elevated BMI.  Last injections were given on 12/27/2022, which offered some temporary pain improvement. Patient reports continued aching and grinding pain in the bilateral knee joints that worsens with longer periods of weightbearing activity.  Patient also continues to experience intermittent joint stiffness and joint swelling.  Patient states his bilateral knee pain has been gradually worsening/returning.  He is requesting repeat injections today in both knees in an effort to improve his pain. Patient continues to take Norco 7.5 mg 4 times daily as needed for control of chronic pain as prescribed by his pain management physician.  Patient is unable to take oral NSAIDs due to his current use of Eliquis. Dr. Cedlilo had previously tried to order viscosupplementation but the patient's insurance would not cover these injections. No new complaints or concerns noted since last office visit.  No falls or injuries reported since last office visit.  Current pain scale is 6/10.    Physical Exam: 62 y.o. male  General Appearance:    Alert,  "cooperative, in no acute distress                   Vitals:    02/23/23 1013   Weight: (!) 171 kg (376 lb)   Height: 172.7 cm (68\")   PainSc:   6          Physical Exam:   General Appearance:    Alert, cooperative, in no acute distress.                        Patient is alert and oriented ×3. No acute distress.  Affect is normal. Respiratory rate is normal and unlabored.  Subjective complaints and physical exam are unchanged from last office visit. No signs of infection noted.     Procedure: Bilateral AP standing knee views     Reason for exam: Bilateral knee pain.     FINDINGS: Right knee severe loss of medial and lateral knee joint  compartment height with bone appearing to oppose bone. This would  be suspicious for severe osteoarthritic changes. Otherwise bony  structures of the right knee are normal. There is no evidence of  fracture or dislocation. Soft tissue structures are normal.     Left knee severe loss of medial knee joint compartment height  with bone appearing to oppose bone. This would be suspicious for  severe osteoarthritic changes. Otherwise bony structures of the  left knee are normal. There is no evidence of fracture or  dislocation. Soft tissue structures are normal.     IMPRESSION:  1.  Severe bilateral knee osteoarthritic changes as described  above.  2.  No acute bilateral knee abnormality.     Electronically signed by:  Case Avendano MD  3/23/2022 7:51 AM CDT  Workstation: [id number]     Procedure: Bilateral knee lateral view only     Reason for exam: Knee pain.     FINDINGS: Loss of bilateral knee knee joint compartment heights  better described on the AP standing views suspicious for severe  osteoarthritic changes. Otherwise bony structures of bilateral  knees are normal. There is no evidence of fracture or  dislocation. Soft tissue structures are normal.     IMPRESSION:  1.  Loss of bilateral knee knee joint compartment heights better  described on the AP standing views suspicious for " severe  osteoarthritic changes.   2.  No acute bilateral lateral knee abnormality.     Electronically signed by:  Case Avendano MD  3/23/2022 7:54 AM CDT  Workstation: [id number]    Procedure:  Large Joint Arthrocentesis: R knee  Date/Time: 2/23/2023 10:12 AM  Consent given by: patient  Timeout: Immediately prior to procedure a time out was called to verify the correct patient, procedure, equipment, support staff and site/side marked as required   Supporting Documentation  Indications: pain, joint swelling and diagnostic evaluation   Procedure Details  Location: knee - R knee  Preparation: Patient was prepped and draped in the usual sterile fashion  Needle size: 22 G  Approach: anterolateral  Medications administered: 4 mL lidocaine 1 %; 80 mg triamcinolone acetonide 40 MG/ML  Patient tolerance: patient tolerated the procedure well with no immediate complications    Large Joint Arthrocentesis: L knee  Date/Time: 2/23/2023 10:12 AM  Consent given by: patient  Timeout: Immediately prior to procedure a time out was called to verify the correct patient, procedure, equipment, support staff and site/side marked as required   Supporting Documentation  Indications: pain, diagnostic evaluation and joint swelling   Procedure Details  Location: knee - L knee  Preparation: Patient was prepped and draped in the usual sterile fashion  Needle size: 22 G  Approach: anterolateral  Medications administered: 4 mL lidocaine 1 %; 80 mg triamcinolone acetonide 40 MG/ML  Patient tolerance: patient tolerated the procedure well with no immediate complications      Assessment:    Diagnoses and all orders for this visit:    Chronic pain of both knees  -     Large Joint Arthrocentesis: R knee  -     Large Joint Arthrocentesis: L knee    Primary osteoarthritis of both knees  -     Large Joint Arthrocentesis: R knee  -     Large Joint Arthrocentesis: L knee    Obesity, morbid, BMI 50 or higher ([facility])    Plan:   Patient is doing well overall but  continues to experience chronic/persistent bilateral knee pain due to known end-stage osteoarthritis affecting both knee joints, more pronounced in the right knee.  The patient has been treated conservatively as described in the HPI above.  The patient typically gets good pain relief with intra-articular injections of steroid but they only tend to last him approximately 8 weeks before the pain begins to return.  Patient is requesting repeat injections in both knees today in an effort to improve his pain.  As previously noted, the patient was deemed to not be a surgical candidate for elective knee arthroplasty when he had previously discussed this with Dr. Cedillo due to his elevated BMI and history of recurrent cellulitis in the lower extremities, which continues to be a chronic issue.  There are currently no signs of infection noted and no signs of acute cellulitis in any area near his knee joints today.  Patient does continue to have issues with chronic edema primarily affecting the distal aspects of the lower extremities.  Recommend proceeding today with repeat intra-articular injections of steroid to the bilateral knees for management of joint pain/inflammation/swelling.     Viscosupplementation would be a good treatment option for further management of his chronic osteoarthritic knee pain, but his insurance does not currently cover these injections.  Dr. Cedillo had previously ordered viscosupplementation but his insurance denied those injections.     Recommend the following:  -Rest and activity modification as tolerated and based on his pain.  -Continue with use of a cane or use of a walker for modified weightbearing.  -Patient can gradually progress his weightbearing and activity as pain allows.  -Elevation and ice therapy to the bilateral knees as needed to minimize pain/swelling/inflammation.  -Continue with efforts for weight loss to improve his chronic bilateral knee pain by taking excessive  pressure off of the lower extremities. Current BMI is 57.17.      Patient already takes Norco 7.5 mg tablets for chronic pain per pain management.  He can take some additional Tylenol as needed but should not exceed more than 4000 mg of acetaminophen in a 24-hour period.  Patient should avoid oral NSAIDs due to his current use of Eliquis.      Follow-up in 8 to 12 weeks for recheck as needed for any new, worsening or persistent symptoms.  Follow-up sooner as needed.    EMR Dragon/Transciption Disclaimer: Some of this note may be an electronic transcription/translation of spoken language to printed text using the Dragon Dictation System.     Return in about 8 weeks (around 4/20/2023), or if symptoms worsen or fail to improve, for Recheck.        This document has been electronically signed by TUNDE Joshi on February 23, 2023 12:57 CST      TUNDE Joshi

## 2023-04-27 ENCOUNTER — OFFICE VISIT (OUTPATIENT)
Dept: ORTHOPEDIC SURGERY | Facility: CLINIC | Age: 63
End: 2023-04-27
Payer: COMMERCIAL

## 2023-04-27 VITALS — HEIGHT: 68 IN | BODY MASS INDEX: 47.74 KG/M2 | WEIGHT: 315 LBS

## 2023-04-27 DIAGNOSIS — M17.0 PRIMARY OSTEOARTHRITIS OF BOTH KNEES: Primary | Chronic | ICD-10-CM

## 2023-04-27 DIAGNOSIS — E66.01 OBESITY, MORBID, BMI 50 OR HIGHER: ICD-10-CM

## 2023-04-27 DIAGNOSIS — M25.562 CHRONIC PAIN OF BOTH KNEES: ICD-10-CM

## 2023-04-27 DIAGNOSIS — G89.29 CHRONIC PAIN OF BOTH KNEES: ICD-10-CM

## 2023-04-27 DIAGNOSIS — M25.561 CHRONIC PAIN OF BOTH KNEES: ICD-10-CM

## 2023-04-27 RX ORDER — LIDOCAINE HYDROCHLORIDE 10 MG/ML
4 INJECTION, SOLUTION EPIDURAL; INFILTRATION; INTRACAUDAL; PERINEURAL
Status: COMPLETED | OUTPATIENT
Start: 2023-04-27 | End: 2023-04-27

## 2023-04-27 RX ORDER — TRIAMCINOLONE ACETONIDE 40 MG/ML
80 INJECTION, SUSPENSION INTRA-ARTICULAR; INTRAMUSCULAR
Status: COMPLETED | OUTPATIENT
Start: 2023-04-27 | End: 2023-04-27

## 2023-04-27 RX ADMIN — LIDOCAINE HYDROCHLORIDE 4 ML: 10 INJECTION, SOLUTION EPIDURAL; INFILTRATION; INTRACAUDAL; PERINEURAL at 10:44

## 2023-04-27 RX ADMIN — TRIAMCINOLONE ACETONIDE 80 MG: 40 INJECTION, SUSPENSION INTRA-ARTICULAR; INTRAMUSCULAR at 10:44

## 2023-04-27 NOTE — PROGRESS NOTES
Uriah Willingham is a 62 y.o. male returns for     Chief Complaint   Patient presents with   • Left Knee - Follow-up, Pain, Edema   • Right Knee - Follow-up, Pain, Edema     HISTORY OF PRESENT ILLNESS: Patient presents to office for follow-up of chronic bilateral knee pain due to known end-stage osteoarthritis.  Initial onset of pain occurred several years ago and has progressively worsened over time.  The patient has been treated conservatively with multiple previous intra-articular injections of steroid, intramuscular injections of steroid, intramuscular injections of Toradol, ice therapy, rest/activity modification and modified weightbearing with use of a cane.  Patient had previously discussed his candidacy for knee arthroplasty with Dr. Cedillo and was told he was not a surgical candidate due to recurrent cellulitis in his lower legs and his elevated BMI.  Last injections were given on 2/23/2023, which offered some temporary pain relief.  Patient reports that his bilateral knee pain has been gradually worsening in the past 1 to 2 weeks.  He has not sustained any falls or injuries.  Patient continues to report aching and grinding pain in the bilateral knee joints but worsens with longer periods of weightbearing activity and associated joint stiffness and intermittent joint swelling.  Patient is requesting repeat injections in both knees today in an effort to improve his pain. Patient continues to take Norco 7.5 mg 4 times daily as needed for control of chronic pain as prescribed by his pain management physician.  Patient is unable to take oral NSAIDs due to his current use of Eliquis. Dr. Cedillo had previously tried to order viscosupplementation but the patient's insurance would not cover these injections. No new complaints or concerns noted since last office visit.  Updated x-rays of the bilateral knees performed in office today.      CONCURRENT MEDICAL HISTORY:    The following portions of the  "patient's history were reviewed and updated as appropriate: allergies, current medications, past family history, past medical history, past social history, past surgical history and problem list.     ROS  No fevers or chills.  No chest pain or shortness of air.  No GI or  disturbances.  Bilateral knee pain.    PHYSICAL EXAMINATION:       Ht 172.7 cm (68\")   Wt (!) 171 kg (378 lb)   BMI 57.47 kg/m²     Physical Exam  Vitals reviewed.   Constitutional:       General: He is not in acute distress.     Appearance: He is well-developed. He is obese. He is not ill-appearing.   HENT:      Head: Normocephalic.   Pulmonary:      Effort: Pulmonary effort is normal. No respiratory distress.   Abdominal:      General: There is no distension.      Palpations: Abdomen is soft.   Musculoskeletal:         General: Swelling (Mild, bilateral knees) and tenderness (Mild, bilateral knees) present. No signs of injury.      Right knee: No effusion.      Left knee: No effusion.      Right lower leg: Edema present.      Left lower leg: Edema present.   Skin:     General: Skin is warm and dry.      Capillary Refill: Capillary refill takes less than 2 seconds.      Findings: No erythema.   Neurological:      Mental Status: He is alert and oriented to person, place, and time.      GCS: GCS eye subscore is 4. GCS verbal subscore is 5. GCS motor subscore is 6.   Psychiatric:         Speech: Speech normal.         Behavior: Behavior normal.         Thought Content: Thought content normal.         Judgment: Judgment normal.         GAIT:     []  Normal  [x]  Antalgic    Assistive device: []  None  []  Walker     []  Crutches  [x]  Cane     []  Wheelchair  []  Stretcher    Right Knee Exam     Tenderness   The patient is experiencing tenderness in the lateral joint line and medial joint line (Mild, diffuse).    Range of Motion   Extension: 5   Flexion: 100     Tests   Varus: negative Valgus: negative    Other   Erythema: absent  Sensation: " normal  Pulse: present  Swelling: mild  Effusion: no effusion present    Comments:  Pain and crepitus with range of motion.  Mild, generalized swelling.  Pitting edema noted in the distal aspect of the lower leg, which is a chronic issue.  No signs of cellulitis.  No warmth or erythema.  No signs of infection noted.      Left Knee Exam     Tenderness   The patient is experiencing tenderness in the medial joint line and lateral joint line (Mild, diffuse).    Range of Motion   Extension: 5   Flexion: 110     Tests   Varus: negative Valgus: negative    Other   Erythema: absent  Sensation: normal  Pulse: present  Swelling: mild  Effusion: no effusion present    Comments:  Pain and crepitus with range of motion.  Mild, generalized swelling. Pitting edema noted in the distal aspect of the lower leg, which is a chronic issue.  No signs of cellulitis.  No warmth or erythema.  No signs of infection noted.            XR knee 3 vw bilateral    Result Date: 4/27/2023  Narrative: FINDINGS: Severe tricompartment degenerative disease in both knees with a bone-on-bone appearance in the medial compartments bilaterally.  No acute fracture or dislocation. No bone erosions.        ASSESSMENT:    Diagnoses and all orders for this visit:    Primary osteoarthritis of both knees  -     Large Joint Arthrocentesis: R knee  -     Large Joint Arthrocentesis: L knee    Chronic pain of both knees  -     Large Joint Arthrocentesis: R knee  -     Large Joint Arthrocentesis: L knee    Obesity, morbid, BMI 50 or higher    PLAN    Large Joint Arthrocentesis: R knee  Date/Time: 4/27/2023 10:44 AM  Consent given by: patient  Timeout: Immediately prior to procedure a time out was called to verify the correct patient, procedure, equipment, support staff and site/side marked as required   Supporting Documentation  Indications: pain, diagnostic evaluation and joint swelling   Procedure Details  Location: knee - R knee  Preparation: Patient was prepped and  draped in the usual sterile fashion  Needle size: 22 G  Approach: anterolateral  Medications administered: 4 mL lidocaine PF 1% 1 %; 80 mg triamcinolone acetonide 40 MG/ML  Patient tolerance: patient tolerated the procedure well with no immediate complications    Large Joint Arthrocentesis: L knee  Date/Time: 4/27/2023 10:44 AM  Consent given by: patient  Timeout: Immediately prior to procedure a time out was called to verify the correct patient, procedure, equipment, support staff and site/side marked as required   Supporting Documentation  Indications: pain, joint swelling and diagnostic evaluation   Procedure Details  Location: knee - L knee  Preparation: Patient was prepped and draped in the usual sterile fashion  Needle size: 22 G  Approach: anterolateral  Medications administered: 80 mg triamcinolone acetonide 40 MG/ML; 4 mL lidocaine PF 1% 1 %  Patient tolerance: patient tolerated the procedure well with no immediate complications      X-rays of the bilateral knees performed in office today and reviewed with no acute findings noted.  Patient has redemonstration of advanced/end-stage tricompartmental osteoarthritis in both knee joints.  Patient has been treated conservatively as described in the HPI above. The patient typically gets good pain relief with intra-articular injections of steroid but they only tend to last him approximately 8 weeks before the pain begins to return.  Patient is requesting repeat injections in both knees today in an effort to improve his pain.  As previously noted, the patient was deemed to not be a surgical candidate for elective knee arthroplasty when he had previously discussed this with Dr. Cedillo due to his elevated BMI and history of recurrent cellulitis in the lower extremities, which continues to be a chronic issue.  There are currently no signs of infection noted and no signs of acute cellulitis in any area near his knee joints today.  Patient does continue to have  issues with chronic edema primarily affecting the distal aspects of the lower extremities.  Recommend proceeding today with repeat intra-articular injections of steroid to the bilateral knees for management of joint pain/inflammation/swelling.     As previously noted, viscosupplementation would be a good treatment option for further management of his chronic osteoarthritic knee pain, but his insurance does not currently cover these injections.  Dr. Cedillo had previously ordered viscosupplementation but his insurance denied those injections.      Recommend the following:  -Rest and activity modification as tolerated and based on his pain.  -Continue with use of a cane or use of a walker for modified weightbearing.  -Patient can gradually progress his weightbearing and activity as pain allows.  -Elevation and ice therapy to the bilateral knees as needed to minimize pain/swelling/inflammation.  -Continue with efforts for weight loss to improve his chronic bilateral knee pain by taking excessive pressure off of the lower extremities. Current BMI is 57.47.      Patient already takes Norco 7.5 mg tablets for chronic pain per pain management.  He can take some additional Tylenol as needed but should not exceed more than 4000 mg of acetaminophen in a 24-hour period.  Patient should avoid oral NSAIDs due to his current use of Eliquis.      Follow-up in 8 to 12 weeks for recheck as needed for any new, worsening or persistent symptoms.  Follow-up sooner as needed.    Time spent of a minimum of 20 minutes including the face to face evaluation, reviewing of medical history and prior medial records, reviewing of diagnostic studies, documentation, patient education and coordination of care.     EMR Dragon/[employer] Disclaimer: Some of this note may be an electronic transcription/translation of spoken language to printed text using the Dragon Dictation System.     Return in about 8 weeks (around 6/22/2023), or if symptoms  worsen or fail to improve, for Recheck.        This document has been electronically signed by TUNDE Joshi on April 27, 2023 16:52 CDT      TUNDE Joshi

## 2023-06-14 ENCOUNTER — OFFICE VISIT (OUTPATIENT)
Dept: FAMILY MEDICINE CLINIC | Facility: CLINIC | Age: 63
End: 2023-06-14
Payer: COMMERCIAL

## 2023-06-14 VITALS
WEIGHT: 315 LBS | SYSTOLIC BLOOD PRESSURE: 126 MMHG | DIASTOLIC BLOOD PRESSURE: 78 MMHG | HEIGHT: 68 IN | BODY MASS INDEX: 47.74 KG/M2 | HEART RATE: 52 BPM | OXYGEN SATURATION: 97 %

## 2023-06-14 DIAGNOSIS — Z79.01 CHRONIC ANTICOAGULATION: Chronic | ICD-10-CM

## 2023-06-14 DIAGNOSIS — I10 ESSENTIAL HYPERTENSION, BENIGN: Chronic | ICD-10-CM

## 2023-06-14 DIAGNOSIS — Z12.5 SCREENING PSA (PROSTATE SPECIFIC ANTIGEN): ICD-10-CM

## 2023-06-14 DIAGNOSIS — E66.01 MORBID (SEVERE) OBESITY DUE TO EXCESS CALORIES: Chronic | ICD-10-CM

## 2023-06-14 DIAGNOSIS — E11.9 TYPE 2 DIABETES MELLITUS WITHOUT COMPLICATION, WITHOUT LONG-TERM CURRENT USE OF INSULIN: Primary | Chronic | ICD-10-CM

## 2023-06-14 LAB
EXPIRATION DATE: NORMAL
HBA1C MFR BLD: 5.7 %
Lab: 587

## 2023-06-14 NOTE — PROGRESS NOTES
Subjective   Uriah Willingham is a 62 y.o. male.  Evaluation type 2 diabetes hypertension chronic knee pain chronic venous insufficiency recurrent DVTs type 2 diabetes diagnoses below in the interim has done well is lost 11 pounds try to watch diet and exercise is much as possible as is much as his knees will allow.  A1c holding steady at 5.7.  Other medicines have remained the same.  Will be due for cologuard next year.  Has had an eye exam.  Up-to-date on all other.    History of Present Illness   HPI    The following portions of the patient's history were reviewed and updated as appropriate: allergies, current medications, past family history, past medical history, past social history, past surgical history and problem list.    Review of Systems  Review of Systems   Constitutional: Negative for activity change, appetite change, fatigue and unexpected weight change.   HENT: Negative for trouble swallowing and voice change.    Eyes: Negative for redness and visual disturbance.   Respiratory: Negative for cough and wheezing.    Cardiovascular: Negative for chest pain and palpitations.   Gastrointestinal: Negative for abdominal pain, constipation, diarrhea, nausea and vomiting.   Genitourinary: Negative for urgency.   Musculoskeletal: Positive for arthralgias. Negative for joint swelling.   Neurological: Negative for syncope and headaches.   Hematological: Negative for adenopathy.   Psychiatric/Behavioral: Negative for sleep disturbance.       Objective   Physical Exam  Physical Exam  Constitutional:       Appearance: He is well-developed.   HENT:      Head: Normocephalic.   Eyes:      Pupils: Pupils are equal, round, and reactive to light.   Neck:      Thyroid: No thyromegaly.   Cardiovascular:      Rate and Rhythm: Normal rate and regular rhythm.      Heart sounds: Normal heart sounds. No murmur heard.    No friction rub. No gallop.   Pulmonary:      Breath sounds: Normal breath sounds.   Abdominal:      General:  "There is no distension.      Palpations: Abdomen is soft. There is no mass.      Tenderness: There is no abdominal tenderness.   Musculoskeletal:         General: Normal range of motion.      Cervical back: Normal range of motion.      Comments: Dependent edema no skin breakdown get up and go 3 to 5 seconds with cane   Skin:     General: Skin is warm and dry.   Neurological:      Mental Status: He is alert and oriented to person, place, and time.      Deep Tendon Reflexes: Reflexes are normal and symmetric.   Psychiatric:         Attention and Perception: Attention normal.         Mood and Affect: Mood normal.         Speech: Speech normal.         Behavior: Behavior normal.         Thought Content: Thought content normal.         Cognition and Memory: Cognition normal.     A1c 5.7      Visit Vitals  /78   Pulse 52   Ht 172.7 cm (68\")   Wt (!) 168 kg (369 lb 14.4 oz)   SpO2 97%   BMI 56.24 kg/m²     Body mass index is 56.24 kg/m².    /78   Pulse 52   Ht 172.7 cm (68\")   Wt (!) 168 kg (369 lb 14.4 oz)   SpO2 97%   BMI 56.24 kg/m²     Assessment/Plan   Diagnoses and all orders for this visit:    1. Type 2 diabetes mellitus without complication, without long-term current use of insulin (Primary)  -     POC Glycosylated Hemoglobin (Hb A1C)  -     Comprehensive Metabolic Panel; Future  -     Hemoglobin A1c; Future  -     Lipid Panel With LDL / HDL Ratio; Future  -     Magnesium; Future  -     MicroAlbumin, Urine, Random - Urine, Clean Catch; Future    2. Morbid (severe) obesity due to excess calories    3. Essential hypertension, benign  -     Comprehensive Metabolic Panel; Future  -     Magnesium; Future    4. Chronic anticoagulation  -     CBC (No Diff); Future    5. Screening PSA (prostate specific antigen)  -     PSA Screen; Future    Continue good weight loss measures counseled on hydration lifestyle measures.  Recheck 4 months all lab prior  on wt loss measures and programs    "

## 2023-06-16 DIAGNOSIS — I26.99 RECURRENT PULMONARY EMBOLI: ICD-10-CM

## 2023-06-16 RX ORDER — APIXABAN 5 MG/1
TABLET, FILM COATED ORAL
Qty: 60 TABLET | Refills: 3 | Status: SHIPPED | OUTPATIENT
Start: 2023-06-16

## 2023-06-19 ENCOUNTER — OFFICE VISIT (OUTPATIENT)
Dept: ORTHOPEDIC SURGERY | Facility: CLINIC | Age: 63
End: 2023-06-19
Payer: COMMERCIAL

## 2023-06-19 VITALS — WEIGHT: 315 LBS | HEIGHT: 68 IN | BODY MASS INDEX: 47.74 KG/M2

## 2023-06-19 DIAGNOSIS — M25.562 CHRONIC PAIN OF BOTH KNEES: ICD-10-CM

## 2023-06-19 DIAGNOSIS — G89.29 CHRONIC PAIN OF BOTH KNEES: ICD-10-CM

## 2023-06-19 DIAGNOSIS — E66.01 OBESITY, MORBID, BMI 50 OR HIGHER: ICD-10-CM

## 2023-06-19 DIAGNOSIS — M25.561 CHRONIC PAIN OF BOTH KNEES: ICD-10-CM

## 2023-06-19 DIAGNOSIS — M17.0 PRIMARY OSTEOARTHRITIS OF BOTH KNEES: Primary | ICD-10-CM

## 2023-06-19 RX ORDER — LIDOCAINE HYDROCHLORIDE 10 MG/ML
4 INJECTION, SOLUTION EPIDURAL; INFILTRATION; INTRACAUDAL; PERINEURAL
Status: COMPLETED | OUTPATIENT
Start: 2023-06-19 | End: 2023-06-19

## 2023-06-19 RX ORDER — TRIAMCINOLONE ACETONIDE 40 MG/ML
80 INJECTION, SUSPENSION INTRA-ARTICULAR; INTRAMUSCULAR
Status: COMPLETED | OUTPATIENT
Start: 2023-06-19 | End: 2023-06-19

## 2023-06-19 RX ADMIN — TRIAMCINOLONE ACETONIDE 80 MG: 40 INJECTION, SUSPENSION INTRA-ARTICULAR; INTRAMUSCULAR at 14:59

## 2023-06-19 RX ADMIN — LIDOCAINE HYDROCHLORIDE 4 ML: 10 INJECTION, SOLUTION EPIDURAL; INFILTRATION; INTRACAUDAL; PERINEURAL at 14:59

## 2023-06-19 RX ADMIN — TRIAMCINOLONE ACETONIDE 80 MG: 40 INJECTION, SUSPENSION INTRA-ARTICULAR; INTRAMUSCULAR at 15:01

## 2023-06-19 RX ADMIN — LIDOCAINE HYDROCHLORIDE 4 ML: 10 INJECTION, SOLUTION EPIDURAL; INFILTRATION; INTRACAUDAL; PERINEURAL at 15:01

## 2023-06-19 NOTE — PROGRESS NOTES
Knee Joint Injections      Patient: Uriah Willingham    YOB: 1960    Chief Complaint   Patient presents with   • Left Knee - Injections, Pain, Edema, Follow-up   • Right Knee - Injections, Pain, Edema, Follow-up     History of Present Illness: Patient presents to office for follow-up of chronic bilateral knee pain due to known end-stage osteoarthritis.  Initial onset of pain occurred several years ago and has progressively worsened over time. The patient has been treated conservatively with multiple previous intra-articular injections of steroid, intramuscular injections of steroid, intramuscular injections of Toradol, ice therapy, rest/activity modification and modified weightbearing with use of a cane. Patient had previously discussed his candidacy for knee arthroplasty with Dr. Cedillo and was told he was not a surgical candidate due to recurrent cellulitis in his lower legs and his elevated BMI.  Last injections were given on  4/27/2023, which did offer some temporary pain relief.  Patient states his bilateral knee pain has been gradually worsening in the past week.  Patient has an upcoming trip in about 2 weeks and is concerned about longer periods of weightbearing activity.     Patient continues to report aching and grinding pain in the bilateral knee joints but worsens with longer periods of weightbearing activity and associated joint stiffness and intermittent joint swelling.  Patient is requesting repeat injections in both knees today in an effort to improve his pain. Patient continues to take Norco 7.5 mg 4 times daily as needed for control of chronic pain as prescribed by his pain management physician.  Patient is unable to take oral NSAIDs due to his current use of Eliquis. Dr. Cedillo had previously tried to order viscosupplementation but the patient's insurance would not cover these injections. No new complaints or concerns noted since last office visit. Current pain scale is  "7/10.    Physical Exam: 62 y.o. male  General Appearance:    Alert, cooperative, in no acute distress                   Vitals:    06/19/23 1416   Weight: (!) 171 kg (376 lb 3.2 oz)   Height: 172.7 cm (68\")   PainSc:   7          Physical Exam:   General Appearance:    Alert, cooperative, in no acute distress.                        Patient is alert and oriented ×3. No acute distress.  Affect is normal. Respiratory rate is normal and unlabored.  Subjective complaints and physical exam of the bilateral knees are unchanged from prior office visits.  No signs of infection noted.    XR knee 3 vw bilateral     Result Date: 4/27/2023  Narrative: FINDINGS: Severe tricompartment degenerative disease in both knees with a bone-on-bone appearance in the medial compartments bilaterally.  No acute fracture or dislocation. No bone erosions.    Procedure:  Large Joint Arthrocentesis: L knee  Date/Time: 6/19/2023 3:01 PM  Consent given by: patient  Timeout: Immediately prior to procedure a time out was called to verify the correct patient, procedure, equipment, support staff and site/side marked as required   Supporting Documentation  Indications: pain, joint swelling and diagnostic evaluation   Procedure Details  Location: knee - L knee  Preparation: Patient was prepped and draped in the usual sterile fashion  Needle size: 22 G  Approach: anterolateral  Medications administered: 80 mg triamcinolone acetonide 40 MG/ML; 4 mL lidocaine PF 1% 1 %  Patient tolerance: patient tolerated the procedure well with no immediate complications    Large Joint Arthrocentesis: R knee  Date/Time: 6/19/2023 2:59 PM  Consent given by: patient  Timeout: Immediately prior to procedure a time out was called to verify the correct patient, procedure, equipment, support staff and site/side marked as required   Supporting Documentation  Indications: pain, diagnostic evaluation and joint swelling   Procedure Details  Location: knee - R knee  Preparation: " Patient was prepped and draped in the usual sterile fashion  Needle size: 22 G  Approach: anterolateral  Medications administered: 4 mL lidocaine PF 1% 1 %; 80 mg triamcinolone acetonide 40 MG/ML  Patient tolerance: patient tolerated the procedure well with no immediate complications      Assessment:    Diagnoses and all orders for this visit:    Primary osteoarthritis of both knees  -     Large Joint Arthrocentesis: R knee  -     Large Joint Arthrocentesis: L knee    Chronic pain of both knees  -     Large Joint Arthrocentesis: R knee  -     Large Joint Arthrocentesis: L knee    Obesity, morbid, BMI 50 or higher    Plan:     Patient complains of continued chronic bilateral knee pain due to known, advanced/end-stage tricompartmental osteoarthritic changes affecting both knee joints.  Patient has been treated conservatively as described in the HPI above.  He was previously deemed to not be a surgical candidate for elective knee arthroplasty due to his elevated BMI as well as his history of recurrent cellulitis affecting the lower extremities, which continues to be a chronic issue.  Patient states his bilateral knee pain has increased over the past week and he is requesting repeat injections in both knees today.  There are currently no signs of infection or cellulitis in any area near his knee joints today.  Recommend proceeding today with repeat intra-articular injections of steroid to the bilateral knees for management of joint pain/inflammation/swelling.     Patient also requests approval for a handicapped placard, which I have signed the form and given back to the patient today.  He certainly has chronic mobility issues and has difficulty going to certain places/stores and performing daily errands as he cannot walk very far due to his chronic bilateral knee pain.    As previously noted, viscosupplementation would be a good treatment option for further management of his chronic osteoarthritic knee pain, but his  insurance does not currently cover these injections.  Dr. Cedillo had previously ordered viscosupplementation but his insurance denied those injections.     Recommend the following:  -Rest and activity modification as tolerated and based on his pain.  -Continue with use of a cane or use of a walker for modified weightbearing.  -Patient can gradually progress his weightbearing and activity as pain allows.  -Elevation and ice therapy to the bilateral knees as needed to minimize pain/swelling/inflammation.  -Continue with efforts for weight loss to improve his chronic bilateral knee pain by taking excessive pressure off of the lower extremities. Current BMI is 57.20.      Patient already takes Norco 7.5 mg 4 times daily for control of chronic pain per pain management.  He can take some additional Tylenol as needed but should not exceed more than 4000 mg of acetaminophen in a 24-hour period.  Patient should avoid oral NSAIDs due to his current use of Eliquis.      Follow-up in 8 to 12 weeks for recheck as needed for any new, worsening or persistent symptoms.  Follow-up sooner as needed.    EMR Dragon/Transciption Disclaimer: Some of this note may be an electronic transcription/translation of spoken language to printed text using the Dragon Dictation System.     Return in about 8 weeks (around 8/14/2023), or if symptoms worsen or fail to improve, for Recheck.        This document has been electronically signed by TUNDE Joshi on June 19, 2023 16:08 CDT      TUNDE Joshi

## 2023-08-14 ENCOUNTER — OFFICE VISIT (OUTPATIENT)
Dept: ORTHOPEDIC SURGERY | Facility: CLINIC | Age: 63
End: 2023-08-14
Payer: COMMERCIAL

## 2023-08-14 VITALS — HEIGHT: 68 IN | BODY MASS INDEX: 47.74 KG/M2 | WEIGHT: 315 LBS

## 2023-08-14 DIAGNOSIS — M25.561 CHRONIC PAIN OF BOTH KNEES: ICD-10-CM

## 2023-08-14 DIAGNOSIS — E66.01 OBESITY, MORBID, BMI 50 OR HIGHER: ICD-10-CM

## 2023-08-14 DIAGNOSIS — M51.36 LUMBAR DEGENERATIVE DISC DISEASE: ICD-10-CM

## 2023-08-14 DIAGNOSIS — M54.50 CHRONIC MIDLINE LOW BACK PAIN WITHOUT SCIATICA: ICD-10-CM

## 2023-08-14 DIAGNOSIS — Z74.09 LIMITED MOBILITY: ICD-10-CM

## 2023-08-14 DIAGNOSIS — M25.562 CHRONIC PAIN OF BOTH KNEES: ICD-10-CM

## 2023-08-14 DIAGNOSIS — I89.0 LYMPHEDEMA OF BOTH LOWER EXTREMITIES: ICD-10-CM

## 2023-08-14 DIAGNOSIS — I87.2 VENOUS INSUFFICIENCY: Chronic | ICD-10-CM

## 2023-08-14 DIAGNOSIS — G89.29 CHRONIC MIDLINE LOW BACK PAIN WITHOUT SCIATICA: ICD-10-CM

## 2023-08-14 DIAGNOSIS — G89.29 CHRONIC PAIN OF BOTH KNEES: ICD-10-CM

## 2023-08-14 DIAGNOSIS — M17.0 PRIMARY OSTEOARTHRITIS OF BOTH KNEES: Primary | ICD-10-CM

## 2023-08-14 RX ORDER — LIDOCAINE HYDROCHLORIDE 10 MG/ML
4 INJECTION, SOLUTION INFILTRATION; PERINEURAL
Status: COMPLETED | OUTPATIENT
Start: 2023-08-14 | End: 2023-08-14

## 2023-08-14 RX ORDER — TRIAMCINOLONE ACETONIDE 40 MG/ML
80 INJECTION, SUSPENSION INTRA-ARTICULAR; INTRAMUSCULAR
Status: COMPLETED | OUTPATIENT
Start: 2023-08-14 | End: 2023-08-14

## 2023-08-14 RX ADMIN — TRIAMCINOLONE ACETONIDE 80 MG: 40 INJECTION, SUSPENSION INTRA-ARTICULAR; INTRAMUSCULAR at 13:17

## 2023-08-14 RX ADMIN — LIDOCAINE HYDROCHLORIDE 4 ML: 10 INJECTION, SOLUTION INFILTRATION; PERINEURAL at 13:17

## 2023-08-14 RX ADMIN — LIDOCAINE HYDROCHLORIDE 4 ML: 10 INJECTION, SOLUTION INFILTRATION; PERINEURAL at 13:18

## 2023-08-14 RX ADMIN — TRIAMCINOLONE ACETONIDE 80 MG: 40 INJECTION, SUSPENSION INTRA-ARTICULAR; INTRAMUSCULAR at 13:18

## 2023-08-14 NOTE — PROGRESS NOTES
Uriah Willingham is a 62 y.o. male returns for     Chief Complaint   Patient presents with    Left Knee - Follow-up, Pain, Edema    Right Knee - Follow-up, Pain, Edema     HISTORY OF PRESENT ILLNESS: Patient presents to office for follow-up of chronic bilateral knee pain due to known end-stage osteoarthritis.  Initial onset of pain occurred several years ago and has progressively worsened over time.  The patient has been treated conservatively with multiple previous intra-articular injections of steroid, intramuscular injections of steroid, intramuscular injections of Toradol, ice therapy, rest/activity modification and modified weightbearing with use of a cane.  Patient had previously discussed his candidacy for knee arthroplasty with Dr. Cedillo and was told he was not a surgical candidate due to recurrent cellulitis in his lower legs, lymphedema and morbid obesity.  Last injections were given on 6/19/2023, which offered some temporary pain relief.  Patient states that his bilateral knee pain has been gradually worsening recently.  He has an upcoming vacation in about 10 days with his spouse and is concerned about longer periods of weightbearing activity causing increased pain.  Patient describes his pain in the bilateral knees as aching and grinding in nature with associated joint stiffness and intermittent joint swelling.  Patient has increased pain with longer periods of weightbearing activity.  Patient is requesting repeat injections in both knees today in an effort to improve his pain again.  Patient continues to take Norco 7.5 mg 4 times daily as needed for control of chronic pain as prescribed by his pain management physician.  Patient is unable to take oral NSAIDs due to his current use of Eliquis for chronic anticoagulation therapy.  As previously noted, Dr. Cedillo had previously tried to order viscosupplementation but the patient's insurance would not cover these injections.  No new complaints  "or concerns noted regarding his bilateral knees.  No recent falls or injuries reported. Current pain scale is 8/10.    Patient also complains today of progressively worsening mobility.  Patient can only ambulate short distances with use of his cane before he has to sit down.  Patient states that his mobility limitations are affecting his quality of life as he is unable to perform certain daily activities, errands and activities of leisure due to his mobility limitations.  In addition to chronic bilateral knee pain due to end-stage osteoarthritis, the patient also has chronic issues with low back pain due to degenerative disc disease, facet arthropathy and multilevel degenerative changes.  The patient also has chronic issues with lower extremity edema, lymphedema and recurrent celluliti in addition to morbid obesity.  Patient inquires today about starting the process to get a motorized wheelchair so that he can remain independent.     CONCURRENT MEDICAL HISTORY:    The following portions of the patient's history were reviewed and updated as appropriate: allergies, current medications, past family history, past medical history, past social history, past surgical history and problem list.     ROS  No fevers or chills.  No chest pain or shortness of air.  No GI or  disturbances.  Bilateral knee pain.    PHYSICAL EXAMINATION:       Ht 172.7 cm (68\")   Wt (!) 169 kg (372 lb 4.8 oz)   BMI 56.61 kg/mý     Physical Exam  Vitals reviewed.   Constitutional:       General: He is not in acute distress.     Appearance: He is well-developed. He is obese. He is not ill-appearing.   HENT:      Head: Normocephalic.   Pulmonary:      Effort: Pulmonary effort is normal. No respiratory distress.   Abdominal:      General: There is no distension.      Palpations: Abdomen is soft.   Musculoskeletal:         General: Swelling (Mild, bilateral knees) and tenderness (Mild, bilateral knees) present. No signs of injury.      Right knee: No " effusion.      Left knee: No effusion.      Right lower leg: Edema present.      Left lower leg: Edema present.   Skin:     General: Skin is warm and dry.      Capillary Refill: Capillary refill takes less than 2 seconds.   Neurological:      Mental Status: He is alert and oriented to person, place, and time.      GCS: GCS eye subscore is 4. GCS verbal subscore is 5. GCS motor subscore is 6.   Psychiatric:         Speech: Speech normal.         Behavior: Behavior normal.         Thought Content: Thought content normal.         Judgment: Judgment normal.       GAIT:     []  Normal  [x]  Antalgic    Assistive device: []  None  []  Walker     []  Crutches  [x]  Cane     []  Wheelchair []  Stretcher    Right Knee Exam     Tenderness   The patient is experiencing tenderness in the lateral joint line and medial joint line (Mild, diffuse).    Range of Motion   Extension:  5   Flexion:  100     Tests   Varus: negative Valgus: negative    Other   Erythema: absent  Sensation: normal  Pulse: present  Swelling: mild  Effusion: no effusion present    Comments:  Pain and crepitus with range of motion.  Mild, generalized swelling.  Pitting edema noted in the distal aspect of the lower leg, which is a chronic issue.  No overt signs of cellulitis.  No warmth or erythema at the knee joint.  No signs of infection noted.  Physical exam is unchanged from prior office visit.      Left Knee Exam     Tenderness   The patient is experiencing tenderness in the medial joint line and lateral joint line (Mild, diffuse).    Range of Motion   Extension:  5   Flexion:  110     Tests   Varus: negative Valgus: negative    Other   Erythema: absent  Sensation: normal  Pulse: present  Swelling: mild  Effusion: no effusion present    Comments:  Pain and crepitus with range of motion.  Mild, generalized swelling. Pitting edema noted in the distal aspect of the lower leg, which is a chronic issue.  No overt signs of cellulitis.  No warmth or erythema at  the knee joint.  No signs of infection noted.  Physical exam is unchanged from prior office visit.      XR knee 3 vw bilateral     Result Date: 4/27/2023  Narrative: FINDINGS: Severe tricompartment degenerative disease in both knees with a bone-on-bone appearance in the medial compartments bilaterally.  No acute fracture or dislocation. No bone erosions.      ASSESSMENT:    Diagnoses and all orders for this visit:    Primary osteoarthritis of both knees  -     Large Joint Arthrocentesis: R knee  -     Large Joint Arthrocentesis: L knee  -     Miscellaneous DME    Chronic pain of both knees  -     Large Joint Arthrocentesis: R knee  -     Large Joint Arthrocentesis: L knee  -     Miscellaneous DME    Obesity, morbid, BMI 50 or higher  -     Miscellaneous DME    Limited mobility  -     Miscellaneous DME    Venous insufficiency  -     Miscellaneous DME    Lymphedema of both lower extremities  -     Miscellaneous DME    Chronic midline low back pain without sciatica  -     Miscellaneous DME    Lumbar degenerative disc disease  -     Miscellaneous DME    PLAN    Large Joint Arthrocentesis: R knee  Date/Time: 8/14/2023 1:17 PM  Consent given by: patient  Timeout: Immediately prior to procedure a time out was called to verify the correct patient, procedure, equipment, support staff and site/side marked as required   Supporting Documentation  Indications: pain, diagnostic evaluation and joint swelling   Procedure Details  Location: knee - R knee  Preparation: Patient was prepped and draped in the usual sterile fashion  Needle size: 22 G  Approach: anterolateral  Medications administered: 80 mg triamcinolone acetonide 40 MG/ML; 4 mL lidocaine 1 %  Patient tolerance: patient tolerated the procedure well with no immediate complications      Large Joint Arthrocentesis: L knee  Date/Time: 8/14/2023 1:18 PM  Consent given by: patient  Timeout: Immediately prior to procedure a time out was called to verify the correct patient,  procedure, equipment, support staff and site/side marked as required   Supporting Documentation  Indications: pain, joint swelling and diagnostic evaluation   Procedure Details  Location: knee - L knee  Preparation: Patient was prepped and draped in the usual sterile fashion  Needle size: 22 G  Approach: anterolateral  Medications administered: 80 mg triamcinolone acetonide 40 MG/ML; 4 mL lidocaine 1 %  Patient tolerance: patient tolerated the procedure well with no immediate complications    Patient complains of continued chronic bilateral knee pain due to known, advanced/end-stage tricompartmental osteoarthritic changes affecting both knee joints.  Patient has been treated conservatively as described in the HPI above.  He was previously deemed to not be a surgical candidate for elective knee arthroplasty due to his elevated BMI as well as his history of recurrent cellulitis and chronic lymphedema affecting his lower extremities, which continues to be a chronic issue.  Patient gets some temporary relief with intra-articular injections of steroid.  Patient is requesting repeat injections of both knees today as his bilateral knee pain has been gradually worsening again.  Recommend proceeding today with repeat intra-articular injections of steroid into the bilateral knees for management of joint pain/inflammation/swelling.    As previously noted, viscosupplementation would be a good treatment option for further management of his chronic osteoarthritic knee pain, but his insurance does not currently cover these injections.  Dr. Cedillo had previously ordered viscosupplementation but his insurance denied those injections.      Recommend the following:  -Rest and activity modification as tolerated and based on his pain.  -Continue with use of a cane or use of a walker for modified weightbearing.  -Patient can gradually progress his weightbearing and activity as pain allows.  -Elevation and ice therapy to the bilateral  knees as needed to minimize pain/swelling/inflammation.  -Continue with efforts for weight loss to improve his chronic bilateral knee pain by taking excessive pressure off of the lower extremities. Current BMI is 56.61.       Patient already takes Norco 7.5 mg 4 times daily for control of chronic pain per pain management.  He can take some additional Tylenol as needed but should not exceed more than 4000 mg of acetaminophen in a 24-hour period.  Patient should avoid oral NSAIDs due to his current use of Eliquis.     Patient also reports that his mobility is progressively worsening and is affecting his quality of life as he is unable to ambulate for any distance, even with use of his cane.  Patient inquires today about starting the process for a motorized wheelchair so that he can use this to travel longer distances and remain independent.  As described in the HPI above, the patient has multiple issues affecting his mobility including chronic bilateral knee pain due to end-stage osteoarthritic changes, chronic low back pain due to multilevel degenerative changes/degenerative disc disease/facet arthropathy, morbid obesity with a current BMI 56.61 and chronic lower extremity edema/lymphedema with recurrent cellulitis.  An order for the motorized wheelchair is faxed to the [employer] today.     Follow-up in 8 to 12 weeks for recheck as needed for any new, worsening or persistent symptoms.  Follow-up sooner as needed.     Time spent of a minimum of 30 minutes including the face to face evaluation, reviewing of medical history and prior medial records, reviewing of diagnostic studies, ordering DME, documentation, patient education and coordination of care.      EMR Dragon/Boomdizz[employer]iption Disclaimer: Some of this note may be an electronic transcription/translation of spoken language to printed text using the Dragon Dictation System.      Return in about 8 weeks (around 10/9/2023), or if symptoms worsen or fail to  improve, for Recheck.       This document has been electronically signed by TUNDE Joshi on August 14, 2023 14:53 CDT       TUNDE Joshi

## 2023-08-15 ENCOUNTER — TELEPHONE (OUTPATIENT)
Dept: ORTHOPEDIC SURGERY | Facility: CLINIC | Age: 63
End: 2023-08-15
Payer: COMMERCIAL

## 2023-08-15 NOTE — TELEPHONE ENCOUNTER
Spoke with patient. Will try to find medical supply company in [place] that can deliver this to him.    [facility] will deliver to [place]. Faxed over all information needed to them. Patient notified.

## 2023-08-15 NOTE — TELEPHONE ENCOUNTER
[facility] called and stated that they do not service the [facility] for the Pt. To get a mobilized Wheelchair.         You can call back at [phone]

## 2023-08-21 ENCOUNTER — TRANSCRIBE ORDERS (OUTPATIENT)
Dept: PHYSICAL THERAPY | Facility: HOSPITAL | Age: 63
End: 2023-08-21
Payer: COMMERCIAL

## 2023-08-21 DIAGNOSIS — M25.561 CHRONIC PAIN OF BOTH KNEES: ICD-10-CM

## 2023-08-21 DIAGNOSIS — I87.2 VENOUS INSUFFICIENCY: ICD-10-CM

## 2023-08-21 DIAGNOSIS — M51.36 LUMBAR DEGENERATIVE DISC DISEASE: ICD-10-CM

## 2023-08-21 DIAGNOSIS — M54.50 CHRONIC MIDLINE LOW BACK PAIN WITHOUT SCIATICA: ICD-10-CM

## 2023-08-21 DIAGNOSIS — M25.562 CHRONIC PAIN OF BOTH KNEES: ICD-10-CM

## 2023-08-21 DIAGNOSIS — G89.29 CHRONIC PAIN OF BOTH KNEES: ICD-10-CM

## 2023-08-21 DIAGNOSIS — E66.01 OBESITY, MORBID, BMI 50 OR HIGHER: ICD-10-CM

## 2023-08-21 DIAGNOSIS — M17.0 PRIMARY OSTEOARTHRITIS OF BOTH KNEES: Primary | ICD-10-CM

## 2023-08-21 DIAGNOSIS — I89.0 LYMPHEDEMA OF BOTH LOWER EXTREMITIES: ICD-10-CM

## 2023-08-21 DIAGNOSIS — G89.29 CHRONIC MIDLINE LOW BACK PAIN WITHOUT SCIATICA: ICD-10-CM

## 2023-08-21 DIAGNOSIS — Z74.09 LIMITED MOBILITY: ICD-10-CM

## 2023-09-05 ENCOUNTER — HOSPITAL ENCOUNTER (OUTPATIENT)
Dept: PHYSICAL THERAPY | Facility: HOSPITAL | Age: 63
Setting detail: THERAPIES SERIES
Discharge: HOME | End: 2023-09-05
Payer: COMMERCIAL

## 2023-09-05 DIAGNOSIS — G89.29 CHRONIC PAIN OF BOTH KNEES: ICD-10-CM

## 2023-09-05 DIAGNOSIS — M51.36 DEGENERATIVE DISC DISEASE, LUMBAR: ICD-10-CM

## 2023-09-05 DIAGNOSIS — E66.01 OBESITY, MORBID, BMI 50 OR HIGHER: ICD-10-CM

## 2023-09-05 DIAGNOSIS — Z74.09 LIMITED MOBILITY: ICD-10-CM

## 2023-09-05 DIAGNOSIS — I87.2 VENOUS INSUFFICIENCY: ICD-10-CM

## 2023-09-05 DIAGNOSIS — M54.50 CHRONIC MIDLINE LOW BACK PAIN WITHOUT SCIATICA: ICD-10-CM

## 2023-09-05 DIAGNOSIS — I89.0 LYMPHEDEMA OF BOTH LOWER EXTREMITIES: ICD-10-CM

## 2023-09-05 DIAGNOSIS — M17.0 PRIMARY OSTEOARTHRITIS OF BOTH KNEES: Primary | ICD-10-CM

## 2023-09-05 DIAGNOSIS — G89.29 CHRONIC MIDLINE LOW BACK PAIN WITHOUT SCIATICA: ICD-10-CM

## 2023-09-05 DIAGNOSIS — M25.561 CHRONIC PAIN OF BOTH KNEES: ICD-10-CM

## 2023-09-05 DIAGNOSIS — M25.562 CHRONIC PAIN OF BOTH KNEES: ICD-10-CM

## 2023-09-05 PROCEDURE — 97162 PT EVAL MOD COMPLEX 30 MIN: CPT | Performed by: PHYSICAL THERAPIST

## 2023-09-05 NOTE — THERAPY DISCHARGE NOTE
Outpatient Physical Therapy Ortho Initial Evaluation/Discharge  [facility]     Patient Name: Uriah Willingham  : 1960  MRN: [id number]  Today's Date: 2023      Visit Date: 2023    Patient seen for 1 PT sessions.  Patient reports N/A% of improvement.  Next MD appt: TBD/PRN.  Recertification: N/A.    Patient Active Problem List   Diagnosis    KYLAH on CPAP    Chronic pain of both knees    Impingement syndrome of left shoulder    Venous insufficiency    Left hip pain    Lumbar spondylosis    Essential hypertension, benign    Type 2 diabetes mellitus without complication, without long-term current use of insulin    Recurrent pulmonary emboli    Primary osteoarthritis of both knees    Chronic anticoagulation    Morbid (severe) obesity due to excess calories    Chronic narcotic use    Obesity, morbid, BMI 50 or higher    Limited mobility        Past Medical History:   Diagnosis Date    Ankle swelling     Arthritis     Back pain     Diabetes mellitus     GERD (gastroesophageal reflux disease)     Heartburn     History of blood clots     Hypertension     Joint pain     Knee pain     Knee swelling 2000    Muscle pain     Obesity     Sleep apnea     c pap        Past Surgical History:   Procedure Laterality Date    APPENDECTOMY      lap    CARDIAC CATHETERIZATION N/A 10/08/2018    Procedure: Left Heart Cath with Right Radial Approach;  Surgeon: Artis Garber MD;  Location: [facility] CATH INVASIVE LOCATION;  Service: Cardiology    COLONOSCOPY  2014    Diverticulosis in sigmoid colon. Normal cecum & rectum.    INJECTION OF MEDICATION  2016    Kenalog (2)       INJECTION OF MEDICATION  2013    Rocephin (1)       INJECTION OF MEDICATION  2016    Toradol (2)       TONSILLECTOMY      WISDOM TOOTH EXTRACTION           Visit Dx:     ICD-10-CM ICD-9-CM   1. Primary osteoarthritis of both knees  M17.0 715.16   2. Chronic pain of both knees  M25.561 719.46    M25.562 338.29     G89.29    3. Obesity, morbid, BMI 50 or higher  E66.01 278.01   4. Limited mobility  Z74.09 V49.89   5. Venous insufficiency  I87.2 459.81   6. Lymphedema of both lower extremities  I89.0 457.1   7. Chronic midline low back pain without sciatica  M54.50 724.2    G89.29 338.29   8. Degenerative disc disease, lumbar  M51.36 722.52        Patient History       Row Name 09/05/23 1300             History    Chief Complaint Difficulty Walking;Pain  -AJ      Type of Pain Knee pain;Back pain  -AJ      Date Current Problem(s) Began --  Chronic  -AJ      Brief Description of Current Complaint Patient reprots his B knees are shot. He reprots he hasn't worked since sept 2018. He reprots he has been active all his life and his knees are just worn out. He reports he is not a canidate for knee replacement and a history of PE and on blood thinners. He reprots that he was even going ot get gastric bypass and wasn't a cannidate for that also due to his history of clots. He reprots that it is jsut catching up on him. He reports that he has been using the cane on/off for years, but the L knee will give out on him so he uses it for safety and for balance. He reprots he has good days and bad days and even getting around the house gets difficult.  -AJ      Previous treatment for THIS PROBLEM Injections;Pain Management  -AJ      Patient/Caregiver Goals Improve mobility  -AJ      Current Tobacco Use None  -AJ      Smoking Status never smoker  -AJ      Patient's Rating of General Health Fair  -AJ      Hand Dominance right-handed  -AJ      Occupation/sports/leisure activities Occupation: unemployed, retired [employer] ; Hobbies: used to ride side by sides but unable now.  -AJ      Patient seeing anyone else for problem(s)? Ortho  -AJ      What clinical tests have you had for this problem? X-ray  -AJ      Results of Clinical Tests severe degenerative changes in B knees and DD in LB  -AJ         Pain     Pain Location Knee;Back  -AJ       Pain at Present 6  -AJ      Pain at Best 6  -AJ      Pain at Worst 9  -AJ      Pain Frequency Constant/continuous  -AJ      Pain Description Sharp  -AJ      What Performance Factors Make the Current Problem(s) WORSE? standing, walking  -AJ      What Performance Factors Make the Current Problem(s) BETTER? sitting, rest  -AJ                User Key  (r) = Recorded By, (t) = Taken By, (c) = Cosigned By      Initials Name Provider Type    AJ Tamar Gupta, PT DPT Physical Therapist                         PT Neuro       Row Name 09/05/23 1300             Subjective Comments    Subjective Comments see history  -AJ         Precautions and Contraindications    Precautions/Limitations fall precautions  -AJ         Subjective Pain    Able to rate subjective pain? yes  -AJ      Pre-Treatment Pain Level 6  -AJ      Post-Treatment Pain Level 6  -AJ         Home Living    Current Living Arrangements home  -AJ      Home Accessibility wheelchair accessible  -AJ      Home Equipment Cane  -AJ         Vision-Basic Assessment    Current Vision Wears glasses only for reading  -AJ         Cognition    Overall Cognitive Status WFL  -AJ      Arousal/Alertness Appropriate responses to stimuli  -AJ      Memory Appears intact  -AJ      Orientation Level Oriented X4  -AJ      Safety Judgment Good awareness of safety precautions  -AJ      Deficits Fully aware of deficits  -AJ         Sensation    Sensation WNL? WFL  -AJ      Light Touch No apparent deficits  -AJ      Additional Comments Does report areas of altered sensation in legs due to areas of lymphedema and cellulitus  -AJ         Perception    Perception WNL? WNL  -AJ      Initiation Appears intact  -AJ         Posture/Observations    Alignment Options Forward head;Cervical lordosis;Thoracic kyphosis;Rounded shoulders;Scoliosis;Lumbar lordosis;Foot pronation;Pes Planus;Genu valgus  -AJ      Forward Head Mild;Moderate;Increased;Sitting posture  -AJ      Cervical Lordosis  Mild;Decreased;Sitting posture  straightening  -AJ      Thoracic Kyphosis Mild;Moderate;Increased;Sitting posture  -AJ      Rounded Shoulders Bilateral:;Mild;Moderate;Increased;Sitting posture  -AJ      Scoliosis Normal;Sitting posture;Standing posture  -AJ      Lumbar lordosis Mild;Decreased;Sitting posture;Standing posture  flattening  -AJ      Genu valgus Bilateral:;Mild;Moderate;Increased;Standing posture  -AJ      Foot pronation Bilateral:;Moderate;Increased  -AJ      Pes Planus Bilateral:;Moderate;Increased  -AJ      Observations Edema;Erythema  -AJ      Posture/Observations Comments No distress, poor overall sitting and standing postural awareness. Significant B LE edema and lymphedema noted in B legs, L>R.  -AJ         Coordination    Coordination Tests Rapid Alternating;Finger to nose eyes open;Finger to nose eyes closed;Crossing midline;Bilateral integration  -AJ      Rapid Alternating Bilteral:;Intact  -AJ      Finger to Nose Eyes Open Bilteral:;Intact  -AJ      Finger to Nose Eyes Closed Bilteral:;Intact  -AJ      Crossing Midline Bilteral:;Intact  -AJ      Bilateral Integration Bilteral:;Intact  -AJ         Gross Motor Coordination Training    Coordination WFL  -AJ         Tone    UE Tone bilateral:;WNL for age  -AJ      LE Tone bilateral:;WNL for age  -AJ      Trunk Tone WNL for age  -AJ         Bed Mobility    Bed Mobility bed mobility (all) activities  -AJ      All Activities, [place] (Bed Mobility) independent  -AJ         Transfers    Chair-Bed [place] (Transfers) independent  -AJ      Transfers, Bed-Chair-Bed, Assist Device axillary crutches  -AJ      Sit-Stand [place] (Transfers) modified independence  -AJ      Stand-Sit [place] (Transfers) modified independence  -AJ      Transfers, Sit-Stand-Sit, Assist Device other (see comments);straight cane  B UE A on arm rests  -AJ         Gait/Stairs (Locomotion)    [place] Level (Gait) modified independence  -AJ      Assistive  Device (Gait) cane, straight  -AJ      Distance in Feet (Gait) ~40-50 feet  -AJ      Pattern (Gait) 3-point;step-through  -AJ      Deviations/Abnormal Patterns (Gait) base of support, wide;gait speed decreased;bilateral deviations;antalgic  -AJ      Bilateral Gait Deviations weight shift ability decreased  -      Negotiation (Stairs) --  Unable to perform safely  -AJ         Balance Skills Training    Sitting-Level of Assistance Independent  -AJ      Sitting-Balance Support Feet supported  -AJ      Sitting-Balance Activities Lateral lean;Forward lean;Reaching for objects;Reaching across midline  -AJ      Sitting # of Minutes >10 minutes  -AJ      Standing-Level of Assistance Close supervision  -AJ      Static Standing Balance Support assistive device  -AJ      Standing-Balance Activities Forward lean;Reaching across midline  -AJ      Standing Balance # of Minutes <1 minute  -AJ      Gait Balance Support assistive device  -AJ      SLS Unable  -AJ                User Key  (r) = Recorded By, (t) = Taken By, (c) = Cosigned By      Initials Name Provider Type    AJ Tamar uGpta, KO DPT Physical Therapist                    Patient is       Medications: acetaminophen (TYLENOL) 650 MG 8 hr tablet  Eliquis 5 MG tablet tablet  glucose monitor monitoring kit  Hydrocodone-Acetaminophen (XODOL) 7.5-300 MG per tablet  Lancets (FREESTYLE) lancets  LISINOPRIL PO  magnesium oxide (MAGOX) 400 (241.3 MG) MG tablet tablet  metFORMIN (GLUCOPHAGE) 500 MG tablet  metoprolol succinate XL (TOPROL-XL) 25 MG 24 hr tablet  spironolactone (ALDACTONE) 25 MG tablet  triamcinolone (KENALOG) 0.5 % cream    Allergies: None known    Current Wheelchair System:   and Model: N/A  Age:   Seat Width:   Seat Depth:   Back Height:   Overall width:   Overall length:   Seat Height from floor, rear:   Seat Height from floor, front:   Reason for ordering new system:   Accessibility issues with current system:     Has the patient  tried any other types of wheelchairs or scooters?: Yes   If so, what types? Scooters in stores   Problems with other types: Difficult turning radius and seat not big enough    Current Cushion: N/A  Will the patient be able to properly maintain the cushion Independently?: Yes  Has the patient tried any other types of cushions?: No  Any issues with other types?: N/A    Cardio-Respiratory Status: Impaired, history of PE in 2016 and 2018    COGNITIVE VISUAL STATUS:    Memory: Intact   Problem Solving: Intact   Judgement: Intact   Attention/Concentration: Intact   Vision: Intact   Hearing: Intact   Other: N/A    SENSATION:  Intact to crude touch.  Does the patient have any current pressure sores? No  If so, where?: N/A   Does the patient have a history of pressure sores?: No, History of cellulitis with wounds.  At Risk?: No      ADL/IADL STATUS:   Dressing: Independent except shoes and socks, wife assists  Bathing: Independent with shower bench  Feeding: Independent  Grooming/Hygiene: Independent  Toileting: Independent  Meal Prep: Independent and With assistance, can make small meal, but wife does most  Home management: Unable, wife does it.  Bowel management: Continent  Bladder Management: Independent and Continent    Manual W/C propulsion: Unable, due to shoulders and LBP    Operate power W/C with standard joystick: Independent   Operate power W/C with alternate controls: Independent    Able to perform weight shifts: Independent  Hours spent sitting in Wheelchair each day: 4+ hours    MISC. HOME:   Will the patient be driving? Yes  What type of vehicle will be used? Truck, uses a step stool and handle to assist getting in/out  If a car or truck, will the patient load & unload the w/c independently? Yes  Does the patient work or go to school? No  Home Accessibility?  Ramp into house? No, no stairs, flat entrance  Can the patient independently propel w/c on ramps? No  Comments: Does not have UE strength with larger size  "chair needed to be able to adequately propel or reach. Also does not have adequately enough LE strength or capacity to adequately and safely propel on ramps.  Doorways accessible? Yes  Bathroom accessible? Yes  One/Two story?  One  Basement? No  If two is the patient required to go upstairs/downstairs? N/A  [place] in the home? [place], laminate, tile  Does/Would the patient have difficulty propelling on carpet? Yes  Comments: Does not have UE strength with larger size chair needed to be able to adequately propel or reach. Also does not have adequately enough LE strength or capacity to adequately and safely propel on carpet.  Driveway? Gravel up to house  Does/Would the patient have difficulty propelling in grass/gravel? Yes  Comments: Does not have UE strength with larger size chair needed to be able to adequately propel or reach. Also does not have adequately enough LE strength or capacity to adequately and safely propel on grass/gravel.      Client’s Measurements  Across hips:  Hip to knee:   Knee to Foot  Hip to inferior angle of scapula:  Hip to top of shoulder:  Hip to top of head:  Hip to forearm with elbow bent:  Shoulder width:  Chest width:  Chest Depth:    Shoe size: 11.5 Wide men's  Height: 5' 8\"  Weight: 370#  Orthopedic deformities: None  Does the patient suffer from spasms? No  Will lateral supports be needed: No  Will a positioning belt needed? No, standard seat belt      MAT/TABLE/CHAIR ASSESSMENT:    Head and Neck: Function and Control: AROM for cervical spine is all WFL. Strength is also WNL as patient is able to hold head and move fully against gravity.    Shoulders and Arms: Function/Control and ROM: AROM for B shoulders is WNL. Patient is able to reach OH in flexion and abduction fully. AROM for B elbows and forearms is WNL. Patient able to touch shoulders and fully extend B elbows. Strength for B shoulder flex/abd/biceps/triceps 5/5, L shoulder MMT is painful to patient.    Wrists and Hands: " Function/Control and ROM: AROM for B wrists and hands is WFL. Patient is able to make a full composite fist and open B hands fully.  at the #2 setting 75# B.    Trunk: Age related increase in thoracic kyphosis.   Function and Control: AROM for trunk is WFL. Some limitations due to body hiatus and adipose tissue. Trunk strength is WFL able to move and sit fully against gravity with no back support    Pelvis: Slight Posterior/sacral sitting with some flattening of lumbar spine.   Function and Control: Pelvis strength is WFL able to move and sit fully against gravity and sit with no back support    Hips: Sits with B hips abducted due to body hiatus and adipose tissue in abdomen and B LEs.   Function and Control: AROM for B hips is limited again due to body hiatus but is WFL. R hip flexion 4/5, L 4-/5, B hip abd/add/ext 4/5.    Knees and Feet: Function/Control and ROM: AROM for B knees is WFL, mild limitation in B extension with flexion >90° to WFL. Strength B quads is 3+/5 with significant B knee pain. B hamstrings 5/5.    Foot Positioning: B ankle AROM is all WFL. DF past neutral, but has significant B foot and LE edema/lymphedema.     Tone: WNL for age for B UE/LE/Trunk.    Balance (Sitting and Standing): Sitting balance is WFL, standing balance is decreased and unable to withstand mild perturbations and some unsteadiness with ambulation even 2 steps without SC.    Coordination: Patient has excellent eye had coordination and would be able to adequately use a joystick control on a power wheelchair seating system.    Other: Significant impaired B LE venous circulation with significant history of PE, as well as cellulitis and lymphedema in B LEs.    Coverage  Mobility Assessment Basic Coverage: The patient shows to have limitations of mobility that impair the patient's ability to participate in mobility related activities of daily living and instrumental activities of daily living to include but not limited to  feeding, grooming, toileting, dressing, meal preparation, light housekeeping, and bathing either:  Prevents the patient from accomplishing an ADL/IADL entirely, and  Patient can accomplish but with risk to safety, and  Patient can accomplish but not within a reasonable amount of time.    Considering Cane or Walker: Will a cane/walker allow the patient to participate in ADLs/IADLs safely and in a timely manner? No    Considering a manual wheelchair: Does the patient have sufficient upper extremity strength or endurance necessary to self propel an optimally configured manual wheelchair? No    Considering a scooter/POV: Scooter requires the patient to have sufficient trunk strength, hand , and upper extremity function, balance to sit upright, requires ability to stand and pivot and may require more space in the home to maneuver. Given these requirements, is the assessment of the patient and their living environment, is a scooter appropriate?  No    SPECIFIC PMD COVERAGE CRITERIA: Considering a power wheelchair.  Is the patient willing or do they have the cognition, judgment, and/or vision to participate in ADL/IADLs? Yes  Does the patient have the functional ability to consistently access a drive control and judgment and visual ability to safely operate a power wheelchair to participate in ADL/IADLs with the home environment? Yes  Is the patient able to safely operate a power wheelchair?  Yes  Is the patient’s weight is less than or equal to the weight capacity on the power wheelchair? Yes for bariatric  Does the patient’s home provides adequate access between rooms, maneuvering space, and surfaces for the operation of the power wheelchair? Yes  Will use of the power wheelchair significantly improve the patient’s ability to participate in ADLs/IADLs and will the patient use it thin the home? Yes      Therapy Education  Given: Fall prevention and home safety, Other (comment) (POC)  Program: New  How Provided:  Verbal  Provided to: Patient  Level of Understanding: Verbalized        GOALS: Patient Goals (4 weeks):  To increase mobility and independence with ADLs/IADLs.   Short Term Goals (2 weeks):  Pt. to come for seating and mobility evaluation  Pt to show understanding of POC  Pt. to follow up with medical supply company for obtaining a new seating system   Long Term Goal (4 weeks):  Pt. to make return phone call if there is a concern or problem obtaining new seating system.  Pt. to return for further evaluation &/or care if warranted.         PT Assessment/Plan       Row Name 09/05/23 1300          PT Assessment    Functional Limitations Impaired gait;Impaired locomotion;Limitation in home management;Limitations in community activities;Performance in leisure activities  -     Impairments Balance;Edema;Endurance;Gait;Impaired flexibility;Impaired muscle endurance;Joint integrity;Muscle strength;Pain;Sensation;Impaired venous circulation;Integumentary integrity  -     Assessment Comments Patient is a morbidly obese 64yo male who presents to the clinci today with complaints of B knee and LBP with decreasing mobility due to pain. He has limitations in strength, endurance, and balance with significant pain associated with both knee pain and LBP. He would benefit from a power wheelchair seating system, recommendations are below.  -MARNIE     Rehab Potential Excellent  for power wheelchair seating system  -MARNIE     Patient/caregiver participated in establishment of treatment plan and goals Yes  -        PT Plan    PT Frequency One time visit  -MARNIE     Planned CPT's? PT EVAL MOD COMPLELITY: 19075;PT THER SUPP EA 15 MIN  -MARNIE     PT Plan Comments Patient to dfollow up with DME provider for obtaining power wheelchair seating system.  -MARNIE               User Key  (r) = Recorded By, (t) = Taken By, (c) = Cosigned By      Initials Name Provider Type    Tamar Stephens, PT DPT Physical Therapist                           RECOMMENDATIONS AND JUSTIFICATIONS:     The current recommended seating system for this patient is a power wheel chair seating system. A heavy duty is needed due to the patient's size and weight. The use of a power wheelchair will significantly improve his ability to perform ADL/IADLs and in a safer manner. The patient cannot use an appropriately fitted cane, crutches, walker, or optimally configured manual wheelchair due to his size, lack of strength, and significant joint/spine degeneration causing significant pain and decreased safety with ambulation. He is unsafe ambulatory due to those reasons also.    He will need a battery to power the motor on the power wheelchair.    He will also need adjustable height arm rests to appropriately seat the patient. He cannot be accommodated by standard armrests and requires the adjustable height to be properly positioned. His size impacts positioning of the armrests too to avoid rubbing and skin breakdown.                Time Calculation:   Start Time: 1300  Stop Time: 1415  Time Calculation (min): 75 min  Therapy Charges for Today       Code Description Service Date Service Provider Modifiers Qty    66248594085 HC PT THER SUPP EA 15 MIN 9/5/2023 Tamar Gupta PT DPT GP 4    [id number] HC-PT EVAL MOD COMPLEXITY 5 9/5/2023 Tamar Gupta PT DPT  1                  OP PT Discharge Summary  Date of Discharge: 09/05/23  Outcomes Achieved: Discharge from facility occurred on same date as evluation  Discharge Destination: Home without follow-up  Discharge Instructions/Additional Comments: Patient to follow up with DME provider.        This document has been electronically signed by Tamar Gupta PT DPT, [facility] on September 5, 2023 15:05 CDT

## 2023-09-14 ENCOUNTER — OFFICE VISIT (OUTPATIENT)
Dept: FAMILY MEDICINE CLINIC | Facility: CLINIC | Age: 63
End: 2023-09-14
Payer: COMMERCIAL

## 2023-09-14 VITALS
DIASTOLIC BLOOD PRESSURE: 78 MMHG | HEART RATE: 82 BPM | WEIGHT: 315 LBS | HEIGHT: 68 IN | OXYGEN SATURATION: 98 % | SYSTOLIC BLOOD PRESSURE: 122 MMHG | BODY MASS INDEX: 47.74 KG/M2

## 2023-09-14 DIAGNOSIS — E66.01 MORBID (SEVERE) OBESITY DUE TO EXCESS CALORIES: Chronic | ICD-10-CM

## 2023-09-14 DIAGNOSIS — E11.9 TYPE 2 DIABETES MELLITUS WITHOUT COMPLICATION, WITHOUT LONG-TERM CURRENT USE OF INSULIN: Chronic | ICD-10-CM

## 2023-09-14 DIAGNOSIS — Z02.9 ADMINISTRATIVE ENCOUNTER: Primary | ICD-10-CM

## 2023-09-14 DIAGNOSIS — I10 ESSENTIAL HYPERTENSION, BENIGN: Chronic | ICD-10-CM

## 2023-09-14 DIAGNOSIS — Z79.01 CHRONIC ANTICOAGULATION: Chronic | ICD-10-CM

## 2023-09-14 PROBLEM — Z74.09 LIMITED MOBILITY: Chronic | Status: ACTIVE | Noted: 2023-08-14

## 2023-09-14 PROCEDURE — 99213 OFFICE O/P EST LOW 20 MIN: CPT | Performed by: FAMILY MEDICINE

## 2023-09-14 RX ORDER — HYDROCODONE BITARTRATE AND ACETAMINOPHEN 7.5; 325 MG/1; MG/1
1 TABLET ORAL EVERY 6 HOURS
COMMUNITY
Start: 2023-08-24

## 2023-09-14 NOTE — PROGRESS NOTES
"Subjective   Uriah Alexandria Willingham is a 63 y.o. male.  Patient presents today needing form filled out to get 's license.  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  Patient really has no contraindication to standard driving.  Medicines were reviewed history is reviewed.  Form is filled out faxed and filed in media section of the chart.    History of Present Illness   HPI    The following portions of the patient's history were reviewed and updated as appropriate: allergies, current medications, past family history, past medical history, past social history, past surgical history, and problem list.    Review of Systems  Review of Systems   Respiratory:  Negative for shortness of breath.    Cardiovascular:  Positive for leg swelling (Chronic).   Musculoskeletal:  Positive for arthralgias, back pain and gait problem.     Objective   Physical Exam  Physical Exam  Constitutional:       Appearance: Normal appearance. He is obese.   Neurological:      Mental Status: He is alert.   Psychiatric:         Mood and Affect: Mood normal.         Behavior: Behavior normal.         Thought Content: Thought content normal.         Judgment: Judgment normal.         Visit Vitals  /78   Pulse 82   Ht 172.7 cm (68\")   Wt (!) 171 kg (377 lb 8 oz)   SpO2 98%   BMI 57.40 kg/m²     Body mass index is 57.4 kg/m².  /78   Pulse 82   Ht 172.7 cm (68\")   Wt (!) 171 kg (377 lb 8 oz)   SpO2 98%   BMI 57.40 kg/m²       Assessment/Plan   Diagnoses and all orders for this visit:    1. Administrative encounter (Primary)    2. Essential hypertension, benign    3. Type 2 diabetes mellitus without complication, without long-term current use of insulin    4. Morbid (severe) obesity due to excess calories    5. Chronic anticoagulation    Keep regular follow-up  "

## 2025-07-07 NOTE — TELEPHONE ENCOUNTER
Shae Lobo is a 64 year old male who presents for Office Visit (Staple removal /No complaints/Pain in both knees/Left hip pain- pt states he was recommended to take aleve per Dr hughes  but it just isn't helping )    HPI  The patient presents for evaluation of postoperative wound follow up and knee pain.     Postoperative Wound Dehiscence  - He underwent emergency laparoscopic cholecystectomy on 06/24/2025 in [place], with overnight admission due to anesthesia.  - Discharged on 06/25/2025.  - A week later, he drove back from [place] (8-hour journey).  - No current abdominal pain, but itching at the surgical site as hair regrows.  - Planning a vacation next Thursday, inquiring about swimming.  - He is a  and spends long hours sitting.  - Has no other symptoms, would like to get the staples removed     Knee Pain  - Experiencing knee pain for several years, managed with walking and ibuprofen.  - No knee locking.  - No x-rays or cortisone injections.  - Seeking orthopedic referral.    Supplemental information: PAST SURGICAL HISTORY: Cholecystectomy on 06/24/2025.    SOCIAL HISTORY  The patient is a non-smoker and works as a .    Review of Systems  As documented above.    Objective   Vitals:    07/07/25 1205   BP: 105/75   Pulse: 78   Temp: 97.9 °F (36.6 °C)   TempSrc: Oral   SpO2: 98%   Weight: 80.3 kg (177 lb 2.2 oz)   Height: 5' 7\" (1.702 m)   BMI (Calculated): 27.74     Physical Exam    GI: Status post laparoscopic cholecystectomy, 4 incision sites are present, the smaller incision site appears to be healing well, individual sites has 2 staples which were removed with no issues, the linear incision above the umbilicus has a total of 12 staples, all staples were removed, when the remaining 4 staples from the superior aspect of the incision were removed there was abdominal wound dehiscence, we have done the packing, there is no tenderness or infection noted and no pain.  No bleeding.   TALKED TO WIFE=DEVORAH AND TOLD HER KRYSTYNA NEEDS TO MAKE CHARBEL...THEY WILL CALL BACK   Minimal pus drainage noted.        Assessment & Plan   1. Postoperative wound dehiscence  - Wound not infected, no blood clots. Epithelialized edges indicate need for intervention.  - Wound packed. Referred to Dr. Lopes, wound care specialist.  - Clean with wound wash at home, dry, apply gauze, cover with large Band-Aid.  - Avoid abdominal pressure and prolonged sitting.  - Follow-up with Dr. Lopes tomorrow at 10:45 AM.    2. Knee pain  - Ongoing knee pain managed with ibuprofen.  - No knee locking, no prior x-rays or cortisone shots.  - He would like to discuss this after his wound gets better at the next visit     1. Hospital discharge follow-up  2. S/P laparoscopic cholecystectomy  3. Wound dehiscence  4. Encounter for staple removal  5. Colon cancer screening

## (undated) DEVICE — KT INTRO MINISTICK MAX W/GW NITNL/TUNG ECHO 4F 21G 7CM

## (undated) DEVICE — ELECTRODE,RT,STRESS,FOAM,50PK: Brand: MEDLINE

## (undated) DEVICE — ANGIO-SEAL EVOLUTION VASCULAR CLOSURE DEVICE: Brand: ANGIO-SEAL

## (undated) DEVICE — PK CATH LAB 60

## (undated) DEVICE — GW PERIPH GUIDERIGHT STD/J/TP PTFE/PCOAT SS 0.038IN 5X150CM

## (undated) DEVICE — MODEL BT2000 P/N 700287-012KIT CONTENTS: MANIFOLD WITH SALINE AND CONTRAST PORTS, SALINE TUBING WITH SPIKE AND HAND SYRINGE, TRANSDUCER: Brand: BT2000 AUTOMATED MANIFOLD KIT

## (undated) DEVICE — CATH DIAG EXPO M/ PK 6FR FL4/FR4 PIG 3PK

## (undated) DEVICE — MODEL AT P65, P/N 701554-001KIT CONTENTS: HAND CONTROLLER, 3-WAY HIGH-PRESSURE STOPCOCK WITH ROTATING END AND PREMIUM HIGH-PRESSURE TUBING: Brand: ANGIOTOUCH® KIT

## (undated) DEVICE — INTRO SHEATH ART/FEM ENGAGE .038 6F12CM